# Patient Record
Sex: FEMALE | Race: WHITE | Employment: OTHER | URBAN - METROPOLITAN AREA
[De-identification: names, ages, dates, MRNs, and addresses within clinical notes are randomized per-mention and may not be internally consistent; named-entity substitution may affect disease eponyms.]

---

## 2017-01-28 ENCOUNTER — TELEPHONE (OUTPATIENT)
Dept: INTERNAL MEDICINE CLINIC | Facility: CLINIC | Age: 82
End: 2017-01-28

## 2017-01-28 NOTE — TELEPHONE ENCOUNTER
Patient states  She is in [place] and has developed a sinus infection and would like Dr. Jero Sinha to prescribe something for the infection   Advised Dr. Jero Sinha not in today.  Please check with different doctor to see if there is anything she can take for it   P

## 2017-01-28 NOTE — TELEPHONE ENCOUNTER
Onset of sxs 5 days ago. Pt states she is unsure of fever, but she does not feel feverish.   Pt states head congestion, clear nasal drainage, sore throat resolved, no ear pain, productive cough, clear sputum, pt states she feels sob during and after coughi

## 2017-04-19 ENCOUNTER — TELEPHONE (OUTPATIENT)
Dept: OPHTHALMOLOGY | Facility: CLINIC | Age: 82
End: 2017-04-19

## 2017-05-04 ENCOUNTER — TELEPHONE (OUTPATIENT)
Dept: OPHTHALMOLOGY | Facility: CLINIC | Age: 82
End: 2017-05-04

## 2017-05-04 RX ORDER — LATANOPROST 50 UG/ML
SOLUTION/ DROPS OPHTHALMIC
Qty: 7.5 ML | Refills: 0 | Status: SHIPPED | OUTPATIENT
Start: 2017-05-04 | End: 2017-08-08

## 2017-05-05 RX ORDER — LATANOPROST 50 UG/ML
SOLUTION/ DROPS OPHTHALMIC
Qty: 7.5 ML | Refills: 0 | OUTPATIENT
Start: 2017-05-05

## 2017-06-07 ENCOUNTER — PRIOR ORIGINAL RECORDS (OUTPATIENT)
Dept: OTHER | Age: 82
End: 2017-06-07

## 2017-06-14 ENCOUNTER — APPOINTMENT (OUTPATIENT)
Dept: LAB | Facility: HOSPITAL | Age: 82
End: 2017-06-14
Attending: INTERNAL MEDICINE
Payer: MEDICARE

## 2017-06-14 ENCOUNTER — TELEPHONE (OUTPATIENT)
Dept: INTERNAL MEDICINE CLINIC | Facility: CLINIC | Age: 82
End: 2017-06-14

## 2017-06-14 DIAGNOSIS — N39.0 URINARY TRACT INFECTION, SITE UNSPECIFIED: Primary | ICD-10-CM

## 2017-06-14 PROCEDURE — 87186 SC STD MICRODIL/AGAR DIL: CPT | Performed by: INTERNAL MEDICINE

## 2017-06-14 PROCEDURE — 87086 URINE CULTURE/COLONY COUNT: CPT | Performed by: INTERNAL MEDICINE

## 2017-06-14 PROCEDURE — 81001 URINALYSIS AUTO W/SCOPE: CPT | Performed by: INTERNAL MEDICINE

## 2017-06-14 PROCEDURE — 87088 URINE BACTERIA CULTURE: CPT | Performed by: INTERNAL MEDICINE

## 2017-06-14 RX ORDER — NITROFURANTOIN 25; 75 MG/1; MG/1
100 CAPSULE ORAL 2 TIMES DAILY
Qty: 10 CAPSULE | Refills: 0 | Status: ON HOLD | OUTPATIENT
Start: 2017-06-14 | End: 2017-12-31

## 2017-06-14 NOTE — TELEPHONE ENCOUNTER
Recommend urinalysis with reflex culture, then generic Macrobid 100 mg twice daily with food ×5 days.

## 2017-06-14 NOTE — TELEPHONE ENCOUNTER
Actions Requested: Pt declines appt, requesting meds to pharmacy (verified). Please advise.   Situation/Background   Problem: Urinary tract infection   Onset: 5 am this morning   Associated Symptoms: Dysuria and pressure, denies abd pain/back pain or fever,

## 2017-06-14 NOTE — TELEPHONE ENCOUNTER
Spoke with patient (identified name and ), results reviewed and agrees with plan.   rx sent/lab ordered

## 2017-08-08 RX ORDER — LATANOPROST 50 UG/ML
SOLUTION/ DROPS OPHTHALMIC
Qty: 1 BOTTLE | Refills: 11 | Status: SHIPPED | OUTPATIENT
Start: 2017-08-08 | End: 2017-12-14

## 2017-10-24 ENCOUNTER — TELEPHONE (OUTPATIENT)
Dept: OPHTHALMOLOGY | Facility: CLINIC | Age: 82
End: 2017-10-24

## 2017-10-24 NOTE — TELEPHONE ENCOUNTER
Pt noticed corner of L eye is bloodshot, requesting to be seen this week, aware RJM is out of the office. Pls advise thank you.

## 2017-10-24 NOTE — TELEPHONE ENCOUNTER
Spoke to pt and states that it looks like a broken blood vessel ; no symptoms. Explained to pt a Sub Conj Heme and may take 1-2 week to subside.

## 2017-12-14 ENCOUNTER — OFFICE VISIT (OUTPATIENT)
Dept: OPHTHALMOLOGY | Facility: CLINIC | Age: 82
End: 2017-12-14

## 2017-12-14 DIAGNOSIS — H40.1132 PRIMARY OPEN ANGLE GLAUCOMA OF BOTH EYES, MODERATE STAGE: Primary | ICD-10-CM

## 2017-12-14 DIAGNOSIS — Z96.1 PSEUDOPHAKIA OF BOTH EYES: ICD-10-CM

## 2017-12-14 DIAGNOSIS — H43.393 VITREOUS FLOATERS OF BOTH EYES: ICD-10-CM

## 2017-12-14 PROCEDURE — 92014 COMPRE OPH EXAM EST PT 1/>: CPT | Performed by: OPHTHALMOLOGY

## 2017-12-14 RX ORDER — LATANOPROST 50 UG/ML
SOLUTION/ DROPS OPHTHALMIC
Qty: 3 BOTTLE | Refills: 3 | Status: SHIPPED | OUTPATIENT
Start: 2017-12-14 | End: 2018-11-20

## 2017-12-14 NOTE — PROGRESS NOTES
Jennifer Faulkner is a [de-identified]year old female. HPI:     HPI     Patient is here for a dilated exam.  Patient had cataract surgery done in the right eye on 4/13/17, the left eye was done in June 2017. Patient is using glasses for reading only.   Patient is ve Disp:  Rfl:    Calcium Carb-Cholecalciferol (CALCIUM CARBONATE-VITAMIN D3 OR) Take 2 tablets by mouth daily. Disp:  Rfl:    Multiple Vitamin (MULTIVITAMINS) Oral Cap Take 1 capsule by mouth daily.  Disp:  Rfl:    metoprolol Tartrate (LOPRESSOR) 25 MG Oral T Type:  Progressive bifocal          Manifest Refraction     Pt declines a refraction. She does not want glasses for distance and is happy with her vision.                    ASSESSMENT/PLAN:     Diagnoses and Plan:     Primary open angle glaucoma of both ey

## 2017-12-14 NOTE — PATIENT INSTRUCTIONS
Primary open angle glaucoma of both eyes, moderate stage  Discussed with patient Intraocular pressure stable, tolerating medications. Will continue same regimen of Latanoprost in both eyes at bedtime.   Will have patient back for next available visual fiel

## 2017-12-27 ENCOUNTER — TELEPHONE (OUTPATIENT)
Dept: INTERNAL MEDICINE CLINIC | Facility: CLINIC | Age: 82
End: 2017-12-27

## 2017-12-27 NOTE — TELEPHONE ENCOUNTER
Dr Mo Lights:  Patient had to cancel appt today with you. She has her  sick at home, and patient didn't feel well to leave the house. Patient c/o of sinus drainage, stuffy nose,  No sore throat.   Has been feeling nauseated for about 11 days, no vomi

## 2017-12-27 NOTE — TELEPHONE ENCOUNTER
Recommend she use Afrin nasal spray for 2-3 days for nasal congestion, and Robitussin when needed for coughing. Appointment if not soon better.

## 2017-12-27 NOTE — TELEPHONE ENCOUNTER
Patient states unable to make 10:30 appt due to spouse is sick and has no one to drive her. Patient is nauseous and coughing and requesting medication.      Connected to triage since having symptoms

## 2017-12-31 ENCOUNTER — APPOINTMENT (OUTPATIENT)
Dept: GENERAL RADIOLOGY | Facility: HOSPITAL | Age: 82
DRG: 286 | End: 2017-12-31
Attending: EMERGENCY MEDICINE
Payer: MEDICARE

## 2017-12-31 ENCOUNTER — HOSPITAL ENCOUNTER (INPATIENT)
Facility: HOSPITAL | Age: 82
LOS: 4 days | Discharge: HOME | DRG: 286 | End: 2018-01-04
Attending: EMERGENCY MEDICINE | Admitting: HOSPITALIST
Payer: MEDICARE

## 2017-12-31 DIAGNOSIS — I48.91 ATRIAL FIBRILLATION WITH RAPID VENTRICULAR RESPONSE (HCC): Primary | ICD-10-CM

## 2017-12-31 DIAGNOSIS — R07.9 ACUTE CHEST PAIN: ICD-10-CM

## 2017-12-31 LAB
ANION GAP SERPL CALC-SCNC: 11 MMOL/L (ref 0–18)
APTT PPP: 29.8 SECONDS (ref 23.2–35.3)
BASOPHILS # BLD: 0.1 K/UL (ref 0–0.2)
BASOPHILS NFR BLD: 1 %
BNP SERPL-MCNC: 413 PG/ML (ref 0–100)
BUN SERPL-MCNC: 17 MG/DL (ref 8–20)
BUN/CREAT SERPL: 16.3 (ref 10–20)
CALCIUM SERPL-MCNC: 8.9 MG/DL (ref 8.5–10.5)
CHLORIDE SERPL-SCNC: 106 MMOL/L (ref 95–110)
CO2 SERPL-SCNC: 22 MMOL/L (ref 22–32)
CREAT SERPL-MCNC: 1.04 MG/DL (ref 0.5–1.5)
EOSINOPHIL # BLD: 0.2 K/UL (ref 0–0.7)
EOSINOPHIL NFR BLD: 3 %
ERYTHROCYTE [DISTWIDTH] IN BLOOD BY AUTOMATED COUNT: 14.5 % (ref 11–15)
GLUCOSE SERPL-MCNC: 156 MG/DL (ref 70–99)
HCT VFR BLD AUTO: 40.7 % (ref 35–48)
HGB BLD-MCNC: 13.4 G/DL (ref 12–16)
LYMPHOCYTES # BLD: 1.3 K/UL (ref 1–4)
LYMPHOCYTES NFR BLD: 19 %
MAGNESIUM SERPL-MCNC: 2 MG/DL (ref 1.8–2.5)
MCH RBC QN AUTO: 29.8 PG (ref 27–32)
MCHC RBC AUTO-ENTMCNC: 33 G/DL (ref 32–37)
MCV RBC AUTO: 90.3 FL (ref 80–100)
MONOCYTES # BLD: 0.4 K/UL (ref 0–1)
MONOCYTES NFR BLD: 6 %
NEUTROPHILS # BLD AUTO: 4.8 K/UL (ref 1.8–7.7)
NEUTROPHILS NFR BLD: 71 %
OSMOLALITY UR CALC.SUM OF ELEC: 293 MOSM/KG (ref 275–295)
PLATELET # BLD AUTO: 199 K/UL (ref 140–400)
PMV BLD AUTO: 7.8 FL (ref 7.4–10.3)
POTASSIUM SERPL-SCNC: 3.6 MMOL/L (ref 3.3–5.1)
RBC # BLD AUTO: 4.51 M/UL (ref 3.7–5.4)
SODIUM SERPL-SCNC: 139 MMOL/L (ref 136–144)
TROPONIN I SERPL-MCNC: 0.01 NG/ML (ref ?–0.03)
TSH SERPL-ACNC: 3.03 UIU/ML (ref 0.45–5.33)
WBC # BLD AUTO: 6.8 K/UL (ref 4–11)

## 2017-12-31 PROCEDURE — 99223 1ST HOSP IP/OBS HIGH 75: CPT | Performed by: HOSPITALIST

## 2017-12-31 PROCEDURE — 71010 XR CHEST AP PORTABLE  (CPT=71010): CPT | Performed by: EMERGENCY MEDICINE

## 2017-12-31 RX ORDER — DOXEPIN HYDROCHLORIDE 50 MG/1
1 CAPSULE ORAL DAILY
Status: DISCONTINUED | OUTPATIENT
Start: 2017-12-31 | End: 2018-01-04

## 2017-12-31 RX ORDER — ASPIRIN 81 MG/1
81 TABLET ORAL DAILY
Status: DISCONTINUED | OUTPATIENT
Start: 2017-12-31 | End: 2018-01-04

## 2017-12-31 RX ORDER — FUROSEMIDE 10 MG/ML
INJECTION INTRAMUSCULAR; INTRAVENOUS
Status: COMPLETED
Start: 2017-12-31 | End: 2017-12-31

## 2017-12-31 RX ORDER — ASPIRIN 81 MG/1
324 TABLET, CHEWABLE ORAL ONCE
Status: COMPLETED | OUTPATIENT
Start: 2017-12-31 | End: 2017-12-31

## 2017-12-31 RX ORDER — HEPARIN SODIUM AND DEXTROSE 10000; 5 [USP'U]/100ML; G/100ML
INJECTION INTRAVENOUS CONTINUOUS
Status: DISCONTINUED | OUTPATIENT
Start: 2018-01-01 | End: 2018-01-03

## 2017-12-31 RX ORDER — OYSTER SHELL CALCIUM WITH VITAMIN D 500; 200 MG/1; [IU]/1
1 TABLET, FILM COATED ORAL DAILY
Status: DISCONTINUED | OUTPATIENT
Start: 2017-12-31 | End: 2018-01-04

## 2017-12-31 RX ORDER — HEPARIN SODIUM 5000 [USP'U]/ML
5000 INJECTION, SOLUTION INTRAVENOUS; SUBCUTANEOUS EVERY 8 HOURS
Status: DISCONTINUED | OUTPATIENT
Start: 2017-12-31 | End: 2018-01-01

## 2017-12-31 RX ORDER — HEPARIN SODIUM AND DEXTROSE 10000; 5 [USP'U]/100ML; G/100ML
12 INJECTION INTRAVENOUS ONCE
Status: COMPLETED | OUTPATIENT
Start: 2017-12-31 | End: 2018-01-01

## 2017-12-31 RX ORDER — DILTIAZEM HYDROCHLORIDE 5 MG/ML
10 INJECTION INTRAVENOUS ONCE
Status: COMPLETED | OUTPATIENT
Start: 2017-12-31 | End: 2017-12-31

## 2017-12-31 RX ORDER — FUROSEMIDE 10 MG/ML
40 INJECTION INTRAMUSCULAR; INTRAVENOUS DAILY
Status: DISCONTINUED | OUTPATIENT
Start: 2018-01-01 | End: 2018-01-02

## 2017-12-31 RX ORDER — ONDANSETRON 2 MG/ML
4 INJECTION INTRAMUSCULAR; INTRAVENOUS EVERY 6 HOURS PRN
Status: DISCONTINUED | OUTPATIENT
Start: 2017-12-31 | End: 2018-01-04

## 2017-12-31 RX ORDER — LATANOPROST 50 UG/ML
1 SOLUTION/ DROPS OPHTHALMIC NIGHTLY
Status: DISCONTINUED | OUTPATIENT
Start: 2017-12-31 | End: 2018-01-04

## 2017-12-31 RX ORDER — HEPARIN SODIUM 1000 [USP'U]/ML
5000 INJECTION, SOLUTION INTRAVENOUS; SUBCUTANEOUS ONCE
Status: COMPLETED | OUTPATIENT
Start: 2017-12-31 | End: 2018-01-01

## 2017-12-31 RX ORDER — FUROSEMIDE 10 MG/ML
40 INJECTION INTRAMUSCULAR; INTRAVENOUS ONCE
Status: COMPLETED | OUTPATIENT
Start: 2017-12-31 | End: 2017-12-31

## 2017-12-31 NOTE — ED INITIAL ASSESSMENT (HPI)
C/o nausea x 2 weeks, no worsening. C/o left arm pain today. Denies shoulder pain, c/o some chest pressure, c/o SOB with exertion x last few weeks.

## 2017-12-31 NOTE — ED PROVIDER NOTES
Patient Seen in: [facility] AND [facility] Emergency Department    History   Patient presents with:  Chest Pain Angina (cardiovascular)  Shortness Of Breath    Stated Complaint: nausea, pain down left arm    HPI    The patient is an 82-year-old female with a rem Exam   ED Triage Vitals [12/31/17 4797]  BP: 143/82  Pulse: 74  Resp: 18  Temp: (!) 97 °F (36.1 °C)  Temp src: n/a  SpO2: 94 %  O2 Device: None (Room air)    Current:/68   Pulse 98   Temp (!) 97 °F (36.1 °C)   Resp 18   Wt 86 kg   SpO2 92%   BMI 28. 0 ---------                               -----------         ------                     CBC W/ DIFFERENTIAL[[id number]]                              Final result                 Please view results for these tests on the individual orders.    RAINBOW DRAW B Medication List        Present on Admission  Date Reviewed: 10/20/2016          ICD-10-CM Noted POA    Atrial fibrillation with rapid ventricular response ([facility] [address] I48.91 12/31/2017 Unknown

## 2018-01-01 ENCOUNTER — APPOINTMENT (OUTPATIENT)
Dept: CV DIAGNOSTICS | Facility: HOSPITAL | Age: 83
DRG: 286 | End: 2018-01-01
Attending: HOSPITALIST
Payer: MEDICARE

## 2018-01-01 LAB
ANION GAP SERPL CALC-SCNC: 8 MMOL/L (ref 0–18)
APTT PPP: 162.6 SECONDS (ref 23.2–35.3)
APTT PPP: 51.3 SECONDS (ref 23.2–35.3)
BASOPHILS # BLD: 0.1 K/UL (ref 0–0.2)
BASOPHILS NFR BLD: 1 %
BILIRUB UR QL: NEGATIVE
BUN SERPL-MCNC: 17 MG/DL (ref 8–20)
BUN/CREAT SERPL: 15.9 (ref 10–20)
CALCIUM SERPL-MCNC: 8.7 MG/DL (ref 8.5–10.5)
CHLORIDE SERPL-SCNC: 104 MMOL/L (ref 95–110)
CLARITY UR: CLEAR
CO2 SERPL-SCNC: 28 MMOL/L (ref 22–32)
COLOR UR: YELLOW
CREAT SERPL-MCNC: 1.07 MG/DL (ref 0.5–1.5)
EOSINOPHIL # BLD: 0.3 K/UL (ref 0–0.7)
EOSINOPHIL NFR BLD: 3 %
ERYTHROCYTE [DISTWIDTH] IN BLOOD BY AUTOMATED COUNT: 14.6 % (ref 11–15)
GLUCOSE SERPL-MCNC: 119 MG/DL (ref 70–99)
GLUCOSE UR-MCNC: NEGATIVE MG/DL
HCT VFR BLD AUTO: 40.9 % (ref 35–48)
HGB BLD-MCNC: 13.3 G/DL (ref 12–16)
HYALINE CASTS #/AREA URNS AUTO: 1 /LPF
KETONES UR-MCNC: NEGATIVE MG/DL
LEUKOCYTE ESTERASE UR QL STRIP.AUTO: NEGATIVE
LYMPHOCYTES # BLD: 2 K/UL (ref 1–4)
LYMPHOCYTES NFR BLD: 24 %
MCH RBC QN AUTO: 30 PG (ref 27–32)
MCHC RBC AUTO-ENTMCNC: 32.6 G/DL (ref 32–37)
MCV RBC AUTO: 91.9 FL (ref 80–100)
MONOCYTES # BLD: 0.7 K/UL (ref 0–1)
MONOCYTES NFR BLD: 8 %
NEUTROPHILS # BLD AUTO: 5.4 K/UL (ref 1.8–7.7)
NEUTROPHILS NFR BLD: 64 %
NITRITE UR QL STRIP.AUTO: NEGATIVE
OSMOLALITY UR CALC.SUM OF ELEC: 293 MOSM/KG (ref 275–295)
PH UR: 7 [PH] (ref 5–8)
PLATELET # BLD AUTO: 227 K/UL (ref 140–400)
PMV BLD AUTO: 8.4 FL (ref 7.4–10.3)
POTASSIUM SERPL-SCNC: 3.3 MMOL/L (ref 3.3–5.1)
PROT UR-MCNC: NEGATIVE MG/DL
RBC # BLD AUTO: 4.45 M/UL (ref 3.7–5.4)
RBC #/AREA URNS AUTO: 1 /HPF
SODIUM SERPL-SCNC: 140 MMOL/L (ref 136–144)
SP GR UR STRIP: 1.01 (ref 1–1.03)
UROBILINOGEN UR STRIP-ACNC: <2
VIT C UR-MCNC: NEGATIVE MG/DL
WBC # BLD AUTO: 8.3 K/UL (ref 4–11)
WBC #/AREA URNS AUTO: <1 /HPF

## 2018-01-01 PROCEDURE — 93306 TTE W/DOPPLER COMPLETE: CPT | Performed by: HOSPITALIST

## 2018-01-01 PROCEDURE — 99233 SBSQ HOSP IP/OBS HIGH 50: CPT | Performed by: HOSPITALIST

## 2018-01-01 PROCEDURE — B246ZZZ ULTRASONOGRAPHY OF RIGHT AND LEFT HEART: ICD-10-PCS | Performed by: INTERNAL MEDICINE

## 2018-01-01 RX ORDER — METOPROLOL TARTRATE 50 MG/1
50 TABLET, FILM COATED ORAL 2 TIMES DAILY
Status: DISCONTINUED | OUTPATIENT
Start: 2018-01-01 | End: 2018-01-02

## 2018-01-01 NOTE — PROGRESS NOTES
[facility]    Progress Note    Andres Dutton Patient Status:  Inpatient    1935 MRN [id number]   Location [facility] 3W/SW Attending Raúl Cruz MD   Hosp Day # 1 PCP Kvng Baker MD       Subjective:   Armida Watts tablet Oral Daily   • furosemide  40 mg Intravenous Daily       Current PRN Inpatient Meds:      ondansetron HCl    Results:     Lab Results  Component Value Date   WBC 8.3 01/01/2018   HGB 13.3 01/01/2018   HCT 40.9 01/01/2018    01/01/2018   CREAT Plan:   Paroxysmal atrial fibrillation with rapid ventricular rate  Patient started on Cardizem drip will continue the same, cardiology has been consulted. Will check TSH and mag.   2D echo pending     Likely acute diastolic heart failure  BNP elevated at

## 2018-01-01 NOTE — CONSULTS
[facility]  Cardiology Consultation    Demetrius Gibbons Patient Status:  Inpatient    1935 MRN [id number]   Location [facility] 3W/SW Attending Dayo Davis MD   Hosp Day # 0 PCP Ginny Qureshi MD     Reason for [facility] • Diabetes Father    • Pancreatic Cancer [OTHER] Father    • Renal Cell Carcinoma [OTHER] Brother    • Crohn's Disease Brother    • Glaucoma Mother    • Glaucoma Paternal Aunt    • Macular degeneration Paternal Aunt       reports that she has never smoke hours ending 12/31/17 2334  Wt Readings from Last 3 Encounters:  12/31/17 : 185 lb (83.9 kg)  10/12/16 : 189 lb 11.2 oz (86 kg)  10/27/14 : 184 lb (83.5 kg)    Physical Exam:   General: Alert and oriented x 3. No apparent distress.  No respiratory or consti

## 2018-01-01 NOTE — PROGRESS NOTES
[facility]    Cardiology Progress Note  [facility]    Deepti Johnston Patient Status:  Inpatient    1935 MRN [id number]   Location [facility] 3W/SW Attending Veronika Howell MD   Hosp Day # 1 PCP Sung 1.04 12/31/2017   BUN 17 12/31/2017    12/31/2017   K 3.6 12/31/2017    12/31/2017   CO2 22 12/31/2017    (H) 12/31/2017   CA 8.9 12/31/2017   ALB 3.5 10/15/2016   ALKPHO 82 10/15/2016   BILT 0.7 10/15/2016   TP 6.7 10/15/2016   AST 26 1

## 2018-01-01 NOTE — H&P
[address] Patient Status:  Emergency    1935 MRN [id number]   Location [address] Attending Toma Ramirez MD   Hosp Day # 0 PCP Esequiel Corrales MD     Date: Crohn's Disease Brother    • Glaucoma Mother    • Glaucoma Paternal Aunt    • Macular degeneration Paternal Aunt       reports that she has never smoked. She has never used smokeless tobacco. She reports that she drinks about 0.5 oz of alcohol per week .  Latonya Aggarwal atraumatic. Neck:  Supple, non-tender, no carotid bruit, no jugular venous distention, no lymphadenopathy, no thyromegaly.   Respiratory:  Lungs are clear to auscultation, respirations are non-labored, breath sounds are equal, symmetrical chest wall expans PM

## 2018-01-01 NOTE — HISTORICAL OFFICE NOTE
MAY TAYLOR  : 1935  ACCOUNT: [id number]  PCP: Dr. Weeks Lo: 2017    HPI: Tamie Mansfield is here for a followup appointment.   She is a 51-year-old  female who has history of paroxysmal atrial fibrillation in remote past, SVT, an beverages daily. ALCOHOL: drinks socially. EXERCISE: regular exercise. DIET: no special diet. MARITAL STATUS: . EDUCATION: post graduate degree. OCCUPATION: teacher.     ALLERGIES: Sulfa Antibiotics - CLASS    MEDICATIONS: Selected prescriptions see

## 2018-01-01 NOTE — PROGRESS NOTES
followup  Echo with lv dysfunction. Could be from af but less likely due to short duration. willl schedule for reg nuc for now and attempt to inc po metoprolol, change to long acting thereafter.

## 2018-01-02 ENCOUNTER — APPOINTMENT (OUTPATIENT)
Dept: NUCLEAR MEDICINE | Facility: HOSPITAL | Age: 83
DRG: 286 | End: 2018-01-02
Attending: INTERNAL MEDICINE
Payer: MEDICARE

## 2018-01-02 ENCOUNTER — APPOINTMENT (OUTPATIENT)
Dept: CV DIAGNOSTICS | Facility: HOSPITAL | Age: 83
DRG: 286 | End: 2018-01-02
Attending: INTERNAL MEDICINE
Payer: MEDICARE

## 2018-01-02 LAB
ANION GAP SERPL CALC-SCNC: 6 MMOL/L (ref 0–18)
APTT PPP: 52.4 SECONDS (ref 23.2–35.3)
BASOPHILS # BLD: 0.1 K/UL (ref 0–0.2)
BASOPHILS NFR BLD: 1 %
BUN SERPL-MCNC: 15 MG/DL (ref 8–20)
BUN/CREAT SERPL: 14.4 (ref 10–20)
CALCIUM SERPL-MCNC: 8.7 MG/DL (ref 8.5–10.5)
CHLORIDE SERPL-SCNC: 106 MMOL/L (ref 95–110)
CO2 SERPL-SCNC: 26 MMOL/L (ref 22–32)
CREAT SERPL-MCNC: 1.04 MG/DL (ref 0.5–1.5)
EOSINOPHIL # BLD: 0.3 K/UL (ref 0–0.7)
EOSINOPHIL NFR BLD: 3 %
ERYTHROCYTE [DISTWIDTH] IN BLOOD BY AUTOMATED COUNT: 14.1 % (ref 11–15)
GLUCOSE SERPL-MCNC: 105 MG/DL (ref 70–99)
HCT VFR BLD AUTO: 37.3 % (ref 35–48)
HGB BLD-MCNC: 12.4 G/DL (ref 12–16)
LYMPHOCYTES # BLD: 1.8 K/UL (ref 1–4)
LYMPHOCYTES NFR BLD: 22 %
MAGNESIUM SERPL-MCNC: 1.9 MG/DL (ref 1.8–2.5)
MCH RBC QN AUTO: 30.1 PG (ref 27–32)
MCHC RBC AUTO-ENTMCNC: 33.3 G/DL (ref 32–37)
MCV RBC AUTO: 90.4 FL (ref 80–100)
MONOCYTES # BLD: 0.8 K/UL (ref 0–1)
MONOCYTES NFR BLD: 10 %
NEUTROPHILS # BLD AUTO: 5.4 K/UL (ref 1.8–7.7)
NEUTROPHILS NFR BLD: 64 %
OSMOLALITY UR CALC.SUM OF ELEC: 287 MOSM/KG (ref 275–295)
PLATELET # BLD AUTO: 189 K/UL (ref 140–400)
PMV BLD AUTO: 8.1 FL (ref 7.4–10.3)
POTASSIUM SERPL-SCNC: 3.4 MMOL/L (ref 3.3–5.1)
POTASSIUM SERPL-SCNC: 4 MMOL/L (ref 3.3–5.1)
RBC # BLD AUTO: 4.13 M/UL (ref 3.7–5.4)
SODIUM SERPL-SCNC: 138 MMOL/L (ref 136–144)
WBC # BLD AUTO: 8.4 K/UL (ref 4–11)

## 2018-01-02 PROCEDURE — 78452 HT MUSCLE IMAGE SPECT MULT: CPT | Performed by: INTERNAL MEDICINE

## 2018-01-02 PROCEDURE — 4A12XM4 MONITORING OF CARDIAC STRESS, EXTERNAL APPROACH: ICD-10-PCS | Performed by: NUCLEAR MEDICINE

## 2018-01-02 PROCEDURE — 3E033HZ INTRODUCTION OF RADIOACTIVE SUBSTANCE INTO PERIPHERAL VEIN, PERCUTANEOUS APPROACH: ICD-10-PCS | Performed by: NUCLEAR MEDICINE

## 2018-01-02 PROCEDURE — 93017 CV STRESS TEST TRACING ONLY: CPT | Performed by: INTERNAL MEDICINE

## 2018-01-02 PROCEDURE — 99233 SBSQ HOSP IP/OBS HIGH 50: CPT | Performed by: HOSPITALIST

## 2018-01-02 RX ORDER — 0.9 % SODIUM CHLORIDE 0.9 %
VIAL (ML) INJECTION
Status: COMPLETED
Start: 2018-01-02 | End: 2018-01-02

## 2018-01-02 RX ORDER — ASPIRIN 81 MG/1
324 TABLET, CHEWABLE ORAL ONCE
Status: COMPLETED | OUTPATIENT
Start: 2018-01-03 | End: 2018-01-03

## 2018-01-02 RX ORDER — FUROSEMIDE 20 MG/1
20 TABLET ORAL DAILY
Status: DISCONTINUED | OUTPATIENT
Start: 2018-01-03 | End: 2018-01-02

## 2018-01-02 RX ORDER — POTASSIUM CHLORIDE 20 MEQ/1
40 TABLET, EXTENDED RELEASE ORAL EVERY 4 HOURS
Status: COMPLETED | OUTPATIENT
Start: 2018-01-02 | End: 2018-01-02

## 2018-01-02 RX ORDER — CHLORHEXIDINE GLUCONATE 4 G/100ML
30 SOLUTION TOPICAL
Status: COMPLETED | OUTPATIENT
Start: 2018-01-03 | End: 2018-01-03

## 2018-01-02 NOTE — PLAN OF CARE
Problem: Patient/Family Goals  Goal: Patient/Family Long Term Goal  Patient's Long Term Goal: To go home    Interventions:  - Monitor labs/VS/tele  - Tests/meds per order  - See additional Care Plan goals for specific interventions    Outcome: Progressing

## 2018-01-02 NOTE — PROGRESS NOTES
[facility]   Progress Note    Cynthia Narayan Patient Status:  Inpatient    1935 MRN [id number]   Location [facility] 3W/SW Attending Malka Ca MD   [address] Day # 2 PCP Jamil Martinez MD is medical management vs angiogram if positive     3) Acute diastolic CHF  -echo on 2/1/32 with mild LVH, EF35-40%, no WMA, moderate-severe MR, moderate-severe TR, PA pressure=44mmHg  -on Lasix 40mg IV daily, weight down 2lb and only trace edema, will swit voltage in limb leads.  -Incomplete left bundle branch block.  -Poor R-wave progression -may be secondary to conduction defect consider old anterior infarct. -ST depression + T-abnormality - Lateral ischemia.  ABNORMAL When compared with ECG of 07/13/2011

## 2018-01-02 NOTE — PROGRESS NOTES
[facility]    Progress Note    Aliyah Champion Patient Status:  Inpatient    1935 MRN [id number]   Location [facility] 3W/SW Attending Elsa Mcpherson MD   [facility] Day # 2 PCP Pasty Cooks, MD     Subjective:   Subjective 01/02/2018   K 3.4 01/02/2018    01/02/2018   CO2 26 01/02/2018    (H) 01/02/2018   CA 8.7 01/02/2018   ALB 3.5 10/15/2016   ALKPHO 82 10/15/2016   BILT 0.7 10/15/2016   TP 6.7 10/15/2016   AST 26 10/15/2016   ALT 20 10/15/2016   PTT 52.4 (H)

## 2018-01-03 ENCOUNTER — APPOINTMENT (OUTPATIENT)
Dept: INTERVENTIONAL RADIOLOGY/VASCULAR | Facility: HOSPITAL | Age: 83
DRG: 286 | End: 2018-01-03
Attending: NURSE PRACTITIONER
Payer: MEDICARE

## 2018-01-03 LAB
ANION GAP SERPL CALC-SCNC: 9 MMOL/L (ref 0–18)
APTT PPP: 46 SECONDS (ref 23.2–35.3)
APTT PPP: 51.9 SECONDS (ref 23.2–35.3)
BASOPHILS # BLD: 0.1 K/UL (ref 0–0.2)
BASOPHILS NFR BLD: 1 %
BUN SERPL-MCNC: 19 MG/DL (ref 8–20)
BUN/CREAT SERPL: 19.8 (ref 10–20)
CALCIUM SERPL-MCNC: 9.1 MG/DL (ref 8.5–10.5)
CHLORIDE SERPL-SCNC: 106 MMOL/L (ref 95–110)
CO2 SERPL-SCNC: 25 MMOL/L (ref 22–32)
CREAT SERPL-MCNC: 0.96 MG/DL (ref 0.5–1.5)
EOSINOPHIL # BLD: 0.3 K/UL (ref 0–0.7)
EOSINOPHIL NFR BLD: 4 %
ERYTHROCYTE [DISTWIDTH] IN BLOOD BY AUTOMATED COUNT: 14.6 % (ref 11–15)
GLUCOSE SERPL-MCNC: 108 MG/DL (ref 70–99)
HCT VFR BLD AUTO: 39.5 % (ref 35–48)
HGB BLD-MCNC: 12.9 G/DL (ref 12–16)
INR BLD: 1.1 (ref 0.9–1.2)
LYMPHOCYTES # BLD: 2 K/UL (ref 1–4)
LYMPHOCYTES NFR BLD: 23 %
MAGNESIUM SERPL-MCNC: 2 MG/DL (ref 1.8–2.5)
MCH RBC QN AUTO: 29.7 PG (ref 27–32)
MCHC RBC AUTO-ENTMCNC: 32.6 G/DL (ref 32–37)
MCV RBC AUTO: 91 FL (ref 80–100)
MONOCYTES # BLD: 0.9 K/UL (ref 0–1)
MONOCYTES NFR BLD: 11 %
NEUTROPHILS # BLD AUTO: 5.4 K/UL (ref 1.8–7.7)
NEUTROPHILS NFR BLD: 62 %
OSMOLALITY UR CALC.SUM OF ELEC: 293 MOSM/KG (ref 275–295)
PHOSPHATE SERPL-MCNC: 3.6 MG/DL (ref 2.4–4.7)
PLATELET # BLD AUTO: 174 K/UL (ref 140–400)
PMV BLD AUTO: 7.9 FL (ref 7.4–10.3)
POTASSIUM SERPL-SCNC: 4.2 MMOL/L (ref 3.3–5.1)
PROTHROMBIN TIME: 13.8 SECONDS (ref 11.8–14.5)
RBC # BLD AUTO: 4.34 M/UL (ref 3.7–5.4)
SODIUM SERPL-SCNC: 140 MMOL/L (ref 136–144)
WBC # BLD AUTO: 8.7 K/UL (ref 4–11)

## 2018-01-03 PROCEDURE — B41F1ZZ FLUOROSCOPY OF RIGHT LOWER EXTREMITY ARTERIES USING LOW OSMOLAR CONTRAST: ICD-10-PCS | Performed by: INTERNAL MEDICINE

## 2018-01-03 PROCEDURE — B2151ZZ FLUOROSCOPY OF LEFT HEART USING LOW OSMOLAR CONTRAST: ICD-10-PCS | Performed by: INTERNAL MEDICINE

## 2018-01-03 PROCEDURE — 4A023N7 MEASUREMENT OF CARDIAC SAMPLING AND PRESSURE, LEFT HEART, PERCUTANEOUS APPROACH: ICD-10-PCS | Performed by: INTERNAL MEDICINE

## 2018-01-03 PROCEDURE — 99233 SBSQ HOSP IP/OBS HIGH 50: CPT | Performed by: HOSPITALIST

## 2018-01-03 PROCEDURE — B2111ZZ FLUOROSCOPY OF MULTIPLE CORONARY ARTERIES USING LOW OSMOLAR CONTRAST: ICD-10-PCS | Performed by: INTERNAL MEDICINE

## 2018-01-03 RX ORDER — SODIUM CHLORIDE 9 MG/ML
INJECTION, SOLUTION INTRAVENOUS
Status: COMPLETED
Start: 2018-01-03 | End: 2018-01-03

## 2018-01-03 RX ORDER — MIDAZOLAM HYDROCHLORIDE 1 MG/ML
INJECTION INTRAMUSCULAR; INTRAVENOUS
Status: COMPLETED
Start: 2018-01-03 | End: 2018-01-03

## 2018-01-03 RX ORDER — LIDOCAINE HYDROCHLORIDE 20 MG/ML
INJECTION, SOLUTION EPIDURAL; INFILTRATION; INTRACAUDAL; PERINEURAL
Status: COMPLETED
Start: 2018-01-03 | End: 2018-01-03

## 2018-01-03 RX ORDER — SODIUM CHLORIDE 9 MG/ML
INJECTION, SOLUTION INTRAVENOUS CONTINUOUS
Status: ACTIVE | OUTPATIENT
Start: 2018-01-03 | End: 2018-01-03

## 2018-01-03 NOTE — PROGRESS NOTES
[place] [facility] - [facility]    Progress Note    Hernando Serrano Patient Status:  Inpatient    1935 MRN [id number]   Location [facility] 3W/SW Attending Dick Mancuso MD   [facility] Day # 3 PCP Chilango Ragsdale MD     Subjective:   Subjective workup    Results:       Lab Results  Component Value Date   WBC 8.7 01/03/2018   HGB 12.9 01/03/2018   HCT 39.5 01/03/2018    01/03/2018   CREATSERUM 0.96 01/03/2018   BUN 19 01/03/2018    01/03/2018   K 4.2 01/03/2018    01/03/2018   C

## 2018-01-03 NOTE — PROGRESS NOTES
Juan Huynh  [id number]      Post procedure/ recovery hand-off report given to Ruthie Francisco, [facility]. Patient's vital signs are stable. Procedural  access site is dry and intact with no signs and symptoms of bleeding/ hematoma.  Dr. Lucretia Macias spoke with pt post procedu

## 2018-01-03 NOTE — BRIEF PROCEDURE NOTE
[facility]    [facility] Cardiac Cath Procedure Note  Villagomez Ponrichardson Patient Status:  Inpatient    1935 MRN [id number]   Location [facility] Attending Dick Mancuso MD   [facility] Day # 3 PCP Chilango Ragsdale

## 2018-01-03 NOTE — PRE-SEDATION ASSESSMENT
Pre-Procedure Sedation Assessment    Chief Complaint/Indication for procedure: decreased EF    History of snoring or sleep or apnea?    No    History of previous problems with anesthesia or sedation  No    Physical Findings:  Neck: nl ROM  CV: RRR no GMR  P

## 2018-01-04 ENCOUNTER — PRIOR ORIGINAL RECORDS (OUTPATIENT)
Dept: OTHER | Age: 83
End: 2018-01-04

## 2018-01-04 VITALS
OXYGEN SATURATION: 96 % | WEIGHT: 185.63 LBS | RESPIRATION RATE: 20 BRPM | DIASTOLIC BLOOD PRESSURE: 78 MMHG | HEART RATE: 94 BPM | TEMPERATURE: 98 F | HEIGHT: 71 IN | BODY MASS INDEX: 25.99 KG/M2 | SYSTOLIC BLOOD PRESSURE: 108 MMHG

## 2018-01-04 LAB
ANION GAP SERPL CALC-SCNC: 6 MMOL/L (ref 0–18)
BASOPHILS # BLD: 0.1 K/UL (ref 0–0.2)
BASOPHILS NFR BLD: 1 %
BUN SERPL-MCNC: 19 MG/DL (ref 8–20)
BUN/CREAT SERPL: 18.4 (ref 10–20)
CALCIUM SERPL-MCNC: 8.9 MG/DL (ref 8.5–10.5)
CHLORIDE SERPL-SCNC: 107 MMOL/L (ref 95–110)
CO2 SERPL-SCNC: 26 MMOL/L (ref 22–32)
CREAT SERPL-MCNC: 1.03 MG/DL (ref 0.5–1.5)
EOSINOPHIL # BLD: 0.3 K/UL (ref 0–0.7)
EOSINOPHIL NFR BLD: 3 %
ERYTHROCYTE [DISTWIDTH] IN BLOOD BY AUTOMATED COUNT: 15.1 % (ref 11–15)
GLUCOSE SERPL-MCNC: 107 MG/DL (ref 70–99)
HCT VFR BLD AUTO: 41.5 % (ref 35–48)
HGB BLD-MCNC: 13.6 G/DL (ref 12–16)
LYMPHOCYTES # BLD: 1.4 K/UL (ref 1–4)
LYMPHOCYTES NFR BLD: 16 %
MAGNESIUM SERPL-MCNC: 2.1 MG/DL (ref 1.8–2.5)
MCH RBC QN AUTO: 30.1 PG (ref 27–32)
MCHC RBC AUTO-ENTMCNC: 32.8 G/DL (ref 32–37)
MCV RBC AUTO: 92 FL (ref 80–100)
MONOCYTES # BLD: 0.8 K/UL (ref 0–1)
MONOCYTES NFR BLD: 9 %
NEUTROPHILS # BLD AUTO: 6.4 K/UL (ref 1.8–7.7)
NEUTROPHILS NFR BLD: 71 %
OSMOLALITY UR CALC.SUM OF ELEC: 291 MOSM/KG (ref 275–295)
PHOSPHATE SERPL-MCNC: 3.6 MG/DL (ref 2.4–4.7)
PLATELET # BLD AUTO: 189 K/UL (ref 140–400)
PMV BLD AUTO: 7.8 FL (ref 7.4–10.3)
POTASSIUM SERPL-SCNC: 4.5 MMOL/L (ref 3.3–5.1)
RBC # BLD AUTO: 4.51 M/UL (ref 3.7–5.4)
SODIUM SERPL-SCNC: 139 MMOL/L (ref 136–144)
WBC # BLD AUTO: 9 K/UL (ref 4–11)

## 2018-01-04 PROCEDURE — 99239 HOSP IP/OBS DSCHRG MGMT >30: CPT | Performed by: HOSPITALIST

## 2018-01-04 RX ORDER — ASPIRIN 81 MG/1
81 TABLET ORAL DAILY
Qty: 30 TABLET | Refills: 0 | Status: SHIPPED | OUTPATIENT
Start: 2018-01-05 | End: 2018-01-04

## 2018-01-04 RX ORDER — METOPROLOL TARTRATE 100 MG/1
100 TABLET ORAL 2 TIMES DAILY
Status: DISCONTINUED | OUTPATIENT
Start: 2018-01-04 | End: 2018-01-04

## 2018-01-04 RX ORDER — METOPROLOL TARTRATE 100 MG/1
100 TABLET ORAL 2 TIMES DAILY
Qty: 60 TABLET | Refills: 0 | Status: ON HOLD | OUTPATIENT
Start: 2018-01-04 | End: 2018-01-23

## 2018-01-04 RX ORDER — DILTIAZEM HYDROCHLORIDE 180 MG/1
180 CAPSULE, COATED, EXTENDED RELEASE ORAL DAILY
Qty: 30 CAPSULE | Refills: 0 | Status: SHIPPED | OUTPATIENT
Start: 2018-01-05 | End: 2018-01-23

## 2018-01-04 RX ORDER — DILTIAZEM HYDROCHLORIDE 180 MG/1
180 CAPSULE, COATED, EXTENDED RELEASE ORAL DAILY
Status: DISCONTINUED | OUTPATIENT
Start: 2018-01-05 | End: 2018-01-04

## 2018-01-04 NOTE — PROGRESS NOTES
[facility]    Cardiology Progress Note  [facility]    Cynthia Narayan Patient Status:  Inpatient    1935 MRN [id number]   Location [facility] 3W/SW Attending Malka Ca MD   [facility] Day # 4 PCP Danis Shepherd 8.9 01/04/2018   ALB 3.5 10/15/2016   ALKPHO 82 10/15/2016   BILT 0.7 10/15/2016   TP 6.7 10/15/2016   AST 26 10/15/2016   ALT 20 10/15/2016   PTT 46.0 (H) 01/03/2018   PTT 51.9 (H) 01/03/2018   INR 1.1 01/03/2018   TSH 3.03 12/31/2017   MG 2.1 01/04/2018

## 2018-01-04 NOTE — PROGRESS NOTES
Discharge instructions reviewed with patient and family member (daughter). Patient going home with daughter this afternoon.

## 2018-01-04 NOTE — PROGRESS NOTES
Patient s/p cath, right groin puncture site c/d/i, soft, no bleeding or hematoma, no bruit. Post cath activity/wound instructions reviewed with patient and verbalized understanding.  Patient to follow-up in Afib clinic in 1 week with wound check and then

## 2018-01-05 ENCOUNTER — PATIENT OUTREACH (OUTPATIENT)
Dept: CASE MANAGEMENT | Age: 83
End: 2018-01-05

## 2018-01-05 ENCOUNTER — TELEPHONE (OUTPATIENT)
Dept: CARDIOLOGY UNIT | Facility: HOSPITAL | Age: 83
End: 2018-01-05

## 2018-01-05 ENCOUNTER — TELEPHONE (OUTPATIENT)
Dept: INTERNAL MEDICINE UNIT | Facility: HOSPITAL | Age: 83
End: 2018-01-05

## 2018-01-05 NOTE — PROGRESS NOTES
[facility] ext [phone], [facility] contact information provided.

## 2018-01-05 NOTE — TELEPHONE ENCOUNTER
Attempted to schedule a [facility] hospital follow up with PCP Darren Reynoso Thank you

## 2018-01-06 NOTE — DISCHARGE SUMMARY
[facility]    PATIENT'S NAME: MAY Bass   ATTENDING PHYSICIAN: Crystal Hernández MD   PATIENT ACCOUNT#:   [id number]    LOCATION:  [address] [place] [address] Road #:   [id number]       YOB: 1935  ADMISSION DATE:       12/31/ Pupils equal, round,  _______. CARDIAC:  S1, S2, regularly irregular rhythm. ABDOMEN:  Soft, nontender, nondistended. LUNGS:  Good air entry bilaterally. EXTREMITIES:  No lower extremity edema.   NEUROLOGIC:  No focal neurologic deficits  noted at [facility]

## 2018-01-08 ENCOUNTER — TELEPHONE (OUTPATIENT)
Dept: MEDSURG UNIT | Facility: HOSPITAL | Age: 83
End: 2018-01-08

## 2018-01-08 PROBLEM — I50.21 ACUTE SYSTOLIC CHF (CONGESTIVE HEART FAILURE) (HCC): Status: ACTIVE | Noted: 2017-12-01

## 2018-01-08 NOTE — PROGRESS NOTES
[facility] ext [phone], [facility] contact information provided.

## 2018-01-09 ENCOUNTER — TELEPHONE (OUTPATIENT)
Dept: MEDSURG UNIT | Facility: HOSPITAL | Age: 83
End: 2018-01-09

## 2018-01-09 NOTE — PROGRESS NOTES
TCM OUTREACH    Call made to attempt to reach patient for TCM follow up. No answer, Voicemail left requesting call back at [phone].     (Triage Team if pt returns call please transfer to ext [phone])

## 2018-01-11 ENCOUNTER — OFFICE VISIT (OUTPATIENT)
Dept: CARDIOLOGY CLINIC | Facility: HOSPITAL | Age: 83
End: 2018-01-11
Attending: INTERNAL MEDICINE
Payer: MEDICARE

## 2018-01-11 VITALS
OXYGEN SATURATION: 98 % | BODY MASS INDEX: 26 KG/M2 | DIASTOLIC BLOOD PRESSURE: 63 MMHG | SYSTOLIC BLOOD PRESSURE: 101 MMHG | HEART RATE: 104 BPM | WEIGHT: 188.63 LBS

## 2018-01-11 DIAGNOSIS — I50.9 HEART FAILURE, UNSPECIFIED (HCC): Primary | ICD-10-CM

## 2018-01-11 DIAGNOSIS — I34.0 MITRAL VALVE INSUFFICIENCY, UNSPECIFIED ETIOLOGY: ICD-10-CM

## 2018-01-11 DIAGNOSIS — I50.22 CHRONIC SYSTOLIC HEART FAILURE (HCC): ICD-10-CM

## 2018-01-11 DIAGNOSIS — R09.81 SINUS CONGESTION: ICD-10-CM

## 2018-01-11 DIAGNOSIS — I48.91 ATRIAL FIBRILLATION WITH RAPID VENTRICULAR RESPONSE (HCC): ICD-10-CM

## 2018-01-11 DIAGNOSIS — Z87.898 HISTORY OF SNORING: ICD-10-CM

## 2018-01-11 PROBLEM — R00.2 PALPITATIONS: Chronic | Status: ACTIVE | Noted: 2018-01-11

## 2018-01-11 LAB
ANION GAP SERPL CALC-SCNC: 6 MMOL/L (ref 0–18)
BUN SERPL-MCNC: 17 MG/DL (ref 8–20)
BUN/CREAT SERPL: 15.7 (ref 10–20)
CALCIUM SERPL-MCNC: 9.6 MG/DL (ref 8.5–10.5)
CHLORIDE SERPL-SCNC: 110 MMOL/L (ref 95–110)
CO2 SERPL-SCNC: 25 MMOL/L (ref 22–32)
CREAT SERPL-MCNC: 1.08 MG/DL (ref 0.5–1.5)
GLUCOSE SERPL-MCNC: 137 MG/DL (ref 70–99)
OSMOLALITY UR CALC.SUM OF ELEC: 296 MOSM/KG (ref 275–295)
POTASSIUM SERPL-SCNC: 4.5 MMOL/L (ref 3.3–5.1)
SODIUM SERPL-SCNC: 141 MMOL/L (ref 136–144)

## 2018-01-11 PROCEDURE — 99214 OFFICE O/P EST MOD 30 MIN: CPT | Performed by: NURSE PRACTITIONER

## 2018-01-11 PROCEDURE — 93005 ELECTROCARDIOGRAM TRACING: CPT

## 2018-01-11 PROCEDURE — 80048 BASIC METABOLIC PNL TOTAL CA: CPT | Performed by: NURSE PRACTITIONER

## 2018-01-11 PROCEDURE — 93010 ELECTROCARDIOGRAM REPORT: CPT | Performed by: NURSE PRACTITIONER

## 2018-01-11 PROCEDURE — 36415 COLL VENOUS BLD VENIPUNCTURE: CPT | Performed by: NURSE PRACTITIONER

## 2018-01-11 PROCEDURE — 99212 OFFICE O/P EST SF 10 MIN: CPT | Performed by: NURSE PRACTITIONER

## 2018-01-11 RX ORDER — AZITHROMYCIN 250 MG/1
TABLET, FILM COATED ORAL
Qty: 6 TABLET | Refills: 0 | Status: ON HOLD | OUTPATIENT
Start: 2018-01-11 | End: 2018-01-22 | Stop reason: ALTCHOICE

## 2018-01-11 NOTE — PATIENT INSTRUCTIONS
Begin a Zithromax 250 mg tablets, take 2 tablets today then 1 tablet daily starting tomorrow for 4 days then discontinue    Continue all your same medications    Call if having any dizziness, lightheadedness, heart racing, palpitations, chest pain, shortne

## 2018-01-11 NOTE — PROGRESS NOTES
[address] [facility] Patient Status:  Outpatient    1935 MRN [id number]   Location MD Shonna Gonzalez MD Joenathan Nash is a [de-identified]year old female who presents to i 141 01/11/2018 01:18 PM   K 4.5 01/11/2018 01:18 PM    01/11/2018 01:18 PM   CO2 25 01/11/2018 01:18 PM    (H) 01/11/2018 01:18 PM   CA 9.6 01/11/2018 01:18 PM   ALB 3.5 10/15/2016 09:01 AM   ALKPHO 82 10/15/2016 09:01 AM   BILT 0.7 10/15/2016 procedures, hours, purpose of clinic visits, sodium restricted diet, low sodium foods, fluid restrictions, daily weights, medication regimen s/s of atrial fib/HF exacerbation and when to call APN/clinic.  Greater than 40 minutes with this patient providing gain, fever, chills or worsening symptoms. Weigh yourself daily in the morning before breakfast, call if gaining 3 lbs or more overnight or more than 5 lbs in one week.     Follow 2000 mg sodium restricted , heart healthy diet, limit daily fluids to 48-

## 2018-01-11 NOTE — PROGRESS NOTES
Initial Post Discharge Follow Up   Discharge Date: 1/4/18  Contact Date: 1/5/2018    Consent Verification:  Assessment Completed With: Patient  HIPAA Verified? Yes    Discharge Dx:     1.        Paroxysmal atrial fibrillation with rapid ventricular respo infection or virus. I'm going to the AFIB clinic today so I will ask the doctor if there is an antibiotic I can take. • Do you have any pain since discharge?   no  • When you were leaving the hospital were your discharge instructions explained to you? yes Reviewed/scheduled/rescheduled PCP TCM/HFU appointment with pt: Yes            [x]  Discharge Summary, Discharge medications reviewed/discussed/and reconciled with patient, and orders reviewed and discussed.  Any changes or updates to medications and or or

## 2018-01-15 ENCOUNTER — OFFICE VISIT (OUTPATIENT)
Dept: INTERNAL MEDICINE CLINIC | Facility: CLINIC | Age: 83
End: 2018-01-15

## 2018-01-15 VITALS
BODY MASS INDEX: 27.85 KG/M2 | WEIGHT: 188 LBS | DIASTOLIC BLOOD PRESSURE: 56 MMHG | RESPIRATION RATE: 18 BRPM | HEIGHT: 69 IN | SYSTOLIC BLOOD PRESSURE: 102 MMHG | HEART RATE: 59 BPM

## 2018-01-15 DIAGNOSIS — I50.21 ACUTE SYSTOLIC CHF (CONGESTIVE HEART FAILURE) (HCC): ICD-10-CM

## 2018-01-15 DIAGNOSIS — I48.91 ATRIAL FIBRILLATION WITH RAPID VENTRICULAR RESPONSE (HCC): Primary | ICD-10-CM

## 2018-01-15 PROBLEM — R07.9 ACUTE CHEST PAIN: Status: RESOLVED | Noted: 2017-12-31 | Resolved: 2018-01-15

## 2018-01-15 PROBLEM — R00.2 PALPITATIONS: Chronic | Status: RESOLVED | Noted: 2018-01-11 | Resolved: 2018-01-15

## 2018-01-15 PROBLEM — R09.81 SINUS CONGESTION: Status: RESOLVED | Noted: 2018-01-11 | Resolved: 2018-01-15

## 2018-01-15 PROBLEM — Z87.898 HISTORY OF SNORING: Chronic | Status: RESOLVED | Noted: 2018-01-11 | Resolved: 2018-01-15

## 2018-01-15 PROCEDURE — 99495 TRANSJ CARE MGMT MOD F2F 14D: CPT | Performed by: INTERNAL MEDICINE

## 2018-01-15 NOTE — PROGRESS NOTES
HPI:    Demetrius Gibbons is a [de-identified]year old female here today for hospital follow up.    She was discharged from Inpatient hospital, [facility] AND [facility]  to Home   Admission Date: 12/31/17   Discharge Date: 1/4/18  Hospital Discharge Diagnosis: No Value exists voltage, incomplete left bundle branch block, poor R-wave progression and nonspecific ST segment and T-wave changes.   Echo revealed mild LVH, EF 35-40%, moderate-severe MR, moderate LAE, ROBER, moderate-severe tricuspid regurgitation, and pulmonary hypertens capsule (180 mg total) by mouth daily. latanoprost 0.005 % Ophthalmic Solution Use 1 drop in both eyes at bedtime   Calcium Carb-Cholecalciferol (CALCIUM CARBONATE-VITAMIN D3 OR) Take 2 tablets by mouth daily.    Multiple Vitamin (MULTIVITAMINS) Oral Cap lb (85.3 kg)   BMI 27.76 kg/m²   HEENT: Anicteric, conjunctiva pink, no maxillary or frontal sinus tenderness, oropharynx normal  NECK: Supple without mass or lymphadenopathy  LUNGS: Resonant to percussion and clear to auscultation without crackles or whee

## 2018-01-15 NOTE — PATIENT INSTRUCTIONS
Please continue your current medications. Keep scheduled appointment with Cardiology January 24. Finish azithromycin and begin Flonase 2 sprays each nostril once daily. Return visit in 1 month.

## 2018-01-22 ENCOUNTER — HOSPITAL ENCOUNTER (INPATIENT)
Facility: HOSPITAL | Age: 83
LOS: 1 days | Discharge: HOME | DRG: 308 | End: 2018-01-23
Attending: EMERGENCY MEDICINE | Admitting: HOSPITALIST
Payer: MEDICARE

## 2018-01-22 ENCOUNTER — APPOINTMENT (OUTPATIENT)
Dept: GENERAL RADIOLOGY | Facility: HOSPITAL | Age: 83
DRG: 308 | End: 2018-01-22
Attending: EMERGENCY MEDICINE
Payer: MEDICARE

## 2018-01-22 DIAGNOSIS — I48.91 ATRIAL FIBRILLATION WITH RVR (HCC): Primary | ICD-10-CM

## 2018-01-22 LAB
ANION GAP SERPL CALC-SCNC: 9 MMOL/L (ref 0–18)
BASOPHILS # BLD: 0.1 K/UL (ref 0–0.2)
BASOPHILS NFR BLD: 1 %
BUN SERPL-MCNC: 17 MG/DL (ref 8–20)
BUN/CREAT SERPL: 17 (ref 10–20)
CALCIUM SERPL-MCNC: 8.9 MG/DL (ref 8.5–10.5)
CHLORIDE SERPL-SCNC: 107 MMOL/L (ref 95–110)
CO2 SERPL-SCNC: 22 MMOL/L (ref 22–32)
CREAT SERPL-MCNC: 1 MG/DL (ref 0.5–1.5)
EOSINOPHIL # BLD: 0.2 K/UL (ref 0–0.7)
EOSINOPHIL NFR BLD: 2 %
ERYTHROCYTE [DISTWIDTH] IN BLOOD BY AUTOMATED COUNT: 14.4 % (ref 11–15)
GLUCOSE SERPL-MCNC: 124 MG/DL (ref 70–99)
HCT VFR BLD AUTO: 41.8 % (ref 35–48)
HGB BLD-MCNC: 13.6 G/DL (ref 12–16)
LYMPHOCYTES # BLD: 1.7 K/UL (ref 1–4)
LYMPHOCYTES NFR BLD: 17 %
MCH RBC QN AUTO: 29.8 PG (ref 27–32)
MCHC RBC AUTO-ENTMCNC: 32.5 G/DL (ref 32–37)
MCV RBC AUTO: 91.6 FL (ref 80–100)
MONOCYTES # BLD: 0.8 K/UL (ref 0–1)
MONOCYTES NFR BLD: 9 %
NEUTROPHILS # BLD AUTO: 6.8 K/UL (ref 1.8–7.7)
NEUTROPHILS NFR BLD: 71 %
OSMOLALITY UR CALC.SUM OF ELEC: 289 MOSM/KG (ref 275–295)
PLATELET # BLD AUTO: 236 K/UL (ref 140–400)
PMV BLD AUTO: 8.3 FL (ref 7.4–10.3)
POTASSIUM SERPL-SCNC: 3.7 MMOL/L (ref 3.3–5.1)
RBC # BLD AUTO: 4.56 M/UL (ref 3.7–5.4)
SODIUM SERPL-SCNC: 138 MMOL/L (ref 136–144)
TROPONIN I SERPL-MCNC: 0 NG/ML (ref ?–0.03)
WBC # BLD AUTO: 9.7 K/UL (ref 4–11)

## 2018-01-22 PROCEDURE — 71045 X-RAY EXAM CHEST 1 VIEW: CPT | Performed by: EMERGENCY MEDICINE

## 2018-01-22 PROCEDURE — 99222 1ST HOSP IP/OBS MODERATE 55: CPT | Performed by: HOSPITALIST

## 2018-01-22 RX ORDER — ACETAMINOPHEN 325 MG/1
650 TABLET ORAL EVERY 4 HOURS PRN
Status: DISCONTINUED | OUTPATIENT
Start: 2018-01-22 | End: 2018-01-23

## 2018-01-22 RX ORDER — METOPROLOL TARTRATE 5 MG/5ML
5 INJECTION INTRAVENOUS ONCE
Status: COMPLETED | OUTPATIENT
Start: 2018-01-22 | End: 2018-01-22

## 2018-01-22 RX ORDER — METOPROLOL TARTRATE 50 MG/1
100 TABLET, FILM COATED ORAL ONCE
Status: COMPLETED | OUTPATIENT
Start: 2018-01-22 | End: 2018-01-22

## 2018-01-22 RX ORDER — DOCUSATE SODIUM 100 MG/1
100 CAPSULE, LIQUID FILLED ORAL 2 TIMES DAILY
Status: DISCONTINUED | OUTPATIENT
Start: 2018-01-22 | End: 2018-01-23

## 2018-01-22 RX ORDER — SODIUM CHLORIDE 0.9 % (FLUSH) 0.9 %
10 SYRINGE (ML) INJECTION AS NEEDED
Status: DISCONTINUED | OUTPATIENT
Start: 2018-01-22 | End: 2018-01-23

## 2018-01-22 RX ORDER — SODIUM PHOSPHATE, DIBASIC AND SODIUM PHOSPHATE, MONOBASIC 7; 19 G/133ML; G/133ML
1 ENEMA RECTAL ONCE AS NEEDED
Status: DISCONTINUED | OUTPATIENT
Start: 2018-01-22 | End: 2018-01-23

## 2018-01-22 RX ORDER — POLYETHYLENE GLYCOL 3350 17 G/17G
17 POWDER, FOR SOLUTION ORAL DAILY PRN
Status: DISCONTINUED | OUTPATIENT
Start: 2018-01-22 | End: 2018-01-23

## 2018-01-22 RX ORDER — SODIUM CHLORIDE 0.9 % (FLUSH) 0.9 %
3 SYRINGE (ML) INJECTION AS NEEDED
Status: DISCONTINUED | OUTPATIENT
Start: 2018-01-22 | End: 2018-01-23

## 2018-01-22 RX ORDER — SODIUM CHLORIDE 9 MG/ML
INJECTION, SOLUTION INTRAVENOUS CONTINUOUS
Status: DISCONTINUED | OUTPATIENT
Start: 2018-01-23 | End: 2018-01-23

## 2018-01-22 RX ORDER — DILTIAZEM HYDROCHLORIDE 180 MG/1
180 CAPSULE, COATED, EXTENDED RELEASE ORAL DAILY
Status: DISCONTINUED | OUTPATIENT
Start: 2018-01-23 | End: 2018-01-23

## 2018-01-22 RX ORDER — DOXEPIN HYDROCHLORIDE 50 MG/1
1 CAPSULE ORAL DAILY
Status: DISCONTINUED | OUTPATIENT
Start: 2018-01-22 | End: 2018-01-23

## 2018-01-22 RX ORDER — BISACODYL 10 MG
10 SUPPOSITORY, RECTAL RECTAL
Status: DISCONTINUED | OUTPATIENT
Start: 2018-01-22 | End: 2018-01-23

## 2018-01-22 RX ORDER — HYDROCODONE BITARTRATE AND ACETAMINOPHEN 5; 325 MG/1; MG/1
2 TABLET ORAL EVERY 4 HOURS PRN
Status: DISCONTINUED | OUTPATIENT
Start: 2018-01-22 | End: 2018-01-23

## 2018-01-22 RX ORDER — METOPROLOL TARTRATE 100 MG/1
100 TABLET ORAL 2 TIMES DAILY
Status: DISCONTINUED | OUTPATIENT
Start: 2018-01-22 | End: 2018-01-23

## 2018-01-22 RX ORDER — HYDROCODONE BITARTRATE AND ACETAMINOPHEN 5; 325 MG/1; MG/1
1 TABLET ORAL EVERY 4 HOURS PRN
Status: DISCONTINUED | OUTPATIENT
Start: 2018-01-22 | End: 2018-01-23

## 2018-01-22 RX ORDER — ONDANSETRON 2 MG/ML
4 INJECTION INTRAMUSCULAR; INTRAVENOUS EVERY 6 HOURS PRN
Status: DISCONTINUED | OUTPATIENT
Start: 2018-01-22 | End: 2018-01-23

## 2018-01-22 RX ORDER — DILTIAZEM HYDROCHLORIDE 180 MG/1
180 CAPSULE, COATED, EXTENDED RELEASE ORAL ONCE
Status: COMPLETED | OUTPATIENT
Start: 2018-01-22 | End: 2018-01-22

## 2018-01-22 NOTE — HISTORICAL OFFICE NOTE
MAY TAYLOR  [phone]  : 1935  ACCOUNT:  [id number]  PCP: Hamida De Paz M.D. CARDIOLOGIST: Lula Henao M.D.   Hospital:  [facility]  Admitted:  2017  Discharged:  2018      DISCHARGE SUMMARY    HOSPITAL COURSE:  The yolanda Teto Armstrong is here for a followup appointment. She is a 72-year-old  female who has history of paroxysmal atrial fibrillation in remote past, SVT, and PACs. Symptomatically, she is doing well.   She occasionally continues to have fluttering in the OCCUPATION: teacher.     ALLERGIES: Sulfa Antibiotics - CLASS    MEDICATIONS: Selected prescriptions see below    VITAL SIGNS: [B/P - 104/68 , Pulse - 64, Weight -  185, Height -   70 , BMI - 26.5 ]    CV: PALP: no lifts, thrills or rub and PMI not displace The calculated ejection fraction was 57%. PERFUSION IMAGING: At peak exercise, perfusion images demonstrated normal perfusion. Resting images were unchanged.      Nuclear Cardiology:   IV SITE: Right metacarpal, by LT  REST DOSE: 8.6 mCi Tc-99m Morenita Kind

## 2018-01-22 NOTE — H&P
[address] [facility] [address] Patient Status:  Inpatient    1935 MRN [id number]   Location [facility] 3W/SW Attending Dada Toro MD   Hosp Day # 0 PCP Igor Posadas MD     Date:  2018  Date radius fracture  1976: TUBAL LIGATION  Family History   Problem Relation Age of Onset   • Diabetes Father    • Pancreatic Cancer [OTHER] Father    • Renal Cell Carcinoma [OTHER] Brother    • Crohn's Disease Brother    • Glaucoma Mother    • Glaucoma Patern full range of motion in all the extremities. EXTREMITIES: There was no edema, clubbing or cyanosis  NEUROLOGICAL:  There was no focal deficit. Cranial nerves II through XII were intact.       Cervical Papanicolaou to be done in MD's office    Results: clinical documentation in H+P. Based on patients current state of illness, I anticipate that, after discharge, patient will require TBD.

## 2018-01-22 NOTE — ED PROVIDER NOTES
Patient Seen in: [facility] AND [facility] Emergency Department    History   Patient presents with:  Arrythmia/Palpitations (cardiovascular)    Stated Complaint: palpitations    HPI    Patient presents with atrial fibrillation again.   She was admitted here [facility] tablet (5 mg total) by mouth 2 (two) times daily. Metoprolol Tartrate 100 MG Oral Tab,  Take 1 tablet (100 mg total) by mouth 2 (two) times daily.    DilTIAZem HCl ER Coated Beads 180 MG Oral Capsule SR 24 Hr,  Take 1 capsule (180 mg total) by mouth daily Exam  Constitutional:  Alert, well nourished adult lying in bed in no distress. Vital signs noted. Eye:  No scleral icterus. Eyelids appear normal, no lesions.   Cardiovascular:  Normal S1 and S2, no murmur, irregularly irregular, with good peripheral pe RAINBOW DRAW LIGHT GREEN   RAINBOW DRAW GOLD   RAINBOW DRAW LAVENDER TALL (BNP)   CBC W/ DIFFERENTIAL     EKG    Rate, intervals and axes as noted on EKG Report.   Rate: EKG shows rate 113 atrial fibrillation left bundle branch block noted no acute proces

## 2018-01-22 NOTE — CONSULTS
[facility] AND [facility]  Cardiology Consultation    Job Huh Patient Status:  Emergency    1935 MRN [id number]   Location [address] Attending Miguel Goodrich MD   Hosp Day # 0 PCP Pinky Dye MD     Reason for Con Brother    • Crohn's Disease Brother    • Glaucoma Mother    • Glaucoma Paternal Aunt    • Macular degeneration Paternal Aunt       reports that she has never smoked.  She has never used smokeless tobacco. She reports that she drinks about 0.5 oz of alcohol fib with RVR  Symptomatic  LV dysfunction  Nonobstructive CAD    Recommendations:  Continue current medication including Eliquis  If no spontaneous conversion to normal sinus by tomorrow, plan on JAI and cardioversion. Start Multaq after cardioversion.

## 2018-01-22 NOTE — ED INITIAL ASSESSMENT (HPI)
C/o palpitations since last night. Hx of afib with recent hospitalization and medication changes.  Denies pain, c/o nausea

## 2018-01-23 ENCOUNTER — APPOINTMENT (OUTPATIENT)
Dept: INTERVENTIONAL RADIOLOGY/VASCULAR | Facility: HOSPITAL | Age: 83
DRG: 308 | End: 2018-01-23
Attending: NURSE PRACTITIONER
Payer: MEDICARE

## 2018-01-23 ENCOUNTER — APPOINTMENT (OUTPATIENT)
Dept: CV DIAGNOSTICS | Facility: HOSPITAL | Age: 83
DRG: 308 | End: 2018-01-23
Attending: NURSE PRACTITIONER
Payer: MEDICARE

## 2018-01-23 ENCOUNTER — PRIOR ORIGINAL RECORDS (OUTPATIENT)
Dept: OTHER | Age: 83
End: 2018-01-23

## 2018-01-23 VITALS
WEIGHT: 189 LBS | OXYGEN SATURATION: 93 % | RESPIRATION RATE: 20 BRPM | DIASTOLIC BLOOD PRESSURE: 61 MMHG | HEART RATE: 68 BPM | HEIGHT: 71 IN | BODY MASS INDEX: 26.46 KG/M2 | SYSTOLIC BLOOD PRESSURE: 111 MMHG | TEMPERATURE: 98 F

## 2018-01-23 LAB
ANION GAP SERPL CALC-SCNC: 7 MMOL/L (ref 0–18)
BASOPHILS # BLD: 0.1 K/UL (ref 0–0.2)
BASOPHILS NFR BLD: 1 %
BUN SERPL-MCNC: 19 MG/DL (ref 8–20)
BUN/CREAT SERPL: 19.6 (ref 10–20)
CALCIUM SERPL-MCNC: 8.7 MG/DL (ref 8.5–10.5)
CHLORIDE SERPL-SCNC: 107 MMOL/L (ref 95–110)
CO2 SERPL-SCNC: 25 MMOL/L (ref 22–32)
CREAT SERPL-MCNC: 0.97 MG/DL (ref 0.5–1.5)
EOSINOPHIL # BLD: 0.4 K/UL (ref 0–0.7)
EOSINOPHIL NFR BLD: 5 %
ERYTHROCYTE [DISTWIDTH] IN BLOOD BY AUTOMATED COUNT: 14.7 % (ref 11–15)
GLUCOSE SERPL-MCNC: 102 MG/DL (ref 70–99)
HCT VFR BLD AUTO: 40.2 % (ref 35–48)
HGB BLD-MCNC: 13.1 G/DL (ref 12–16)
LYMPHOCYTES # BLD: 2 K/UL (ref 1–4)
LYMPHOCYTES NFR BLD: 26 %
MAGNESIUM SERPL-MCNC: 2.1 MG/DL (ref 1.8–2.5)
MCH RBC QN AUTO: 29.9 PG (ref 27–32)
MCHC RBC AUTO-ENTMCNC: 32.7 G/DL (ref 32–37)
MCV RBC AUTO: 91.6 FL (ref 80–100)
MONOCYTES # BLD: 0.7 K/UL (ref 0–1)
MONOCYTES NFR BLD: 10 %
NEUTROPHILS # BLD AUTO: 4.5 K/UL (ref 1.8–7.7)
NEUTROPHILS NFR BLD: 59 %
OSMOLALITY UR CALC.SUM OF ELEC: 290 MOSM/KG (ref 275–295)
PLATELET # BLD AUTO: 213 K/UL (ref 140–400)
PMV BLD AUTO: 8.3 FL (ref 7.4–10.3)
POTASSIUM SERPL-SCNC: 3.8 MMOL/L (ref 3.3–5.1)
RBC # BLD AUTO: 4.39 M/UL (ref 3.7–5.4)
SODIUM SERPL-SCNC: 139 MMOL/L (ref 136–144)
WBC # BLD AUTO: 7.7 K/UL (ref 4–11)

## 2018-01-23 PROCEDURE — B246ZZ4 ULTRASONOGRAPHY OF RIGHT AND LEFT HEART, TRANSESOPHAGEAL: ICD-10-PCS | Performed by: INTERNAL MEDICINE

## 2018-01-23 PROCEDURE — 93325 DOPPLER ECHO COLOR FLOW MAPG: CPT | Performed by: NURSE PRACTITIONER

## 2018-01-23 PROCEDURE — 99239 HOSP IP/OBS DSCHRG MGMT >30: CPT | Performed by: HOSPITALIST

## 2018-01-23 PROCEDURE — 93320 DOPPLER ECHO COMPLETE: CPT | Performed by: NURSE PRACTITIONER

## 2018-01-23 PROCEDURE — 5A2204Z RESTORATION OF CARDIAC RHYTHM, SINGLE: ICD-10-PCS | Performed by: INTERNAL MEDICINE

## 2018-01-23 RX ORDER — MIDAZOLAM HYDROCHLORIDE 1 MG/ML
INJECTION INTRAMUSCULAR; INTRAVENOUS
Status: COMPLETED
Start: 2018-01-23 | End: 2018-01-23

## 2018-01-23 RX ORDER — MIDAZOLAM HYDROCHLORIDE 1 MG/ML
2 INJECTION INTRAMUSCULAR; INTRAVENOUS ONCE
Status: COMPLETED | OUTPATIENT
Start: 2018-01-23 | End: 2018-01-23

## 2018-01-23 RX ORDER — METOPROLOL TARTRATE 100 MG/1
50 TABLET ORAL 2 TIMES DAILY
Qty: 60 TABLET | Refills: 0 | Status: SHIPPED | OUTPATIENT
Start: 2018-01-23 | End: 2018-09-24 | Stop reason: ALTCHOICE

## 2018-01-23 RX ORDER — POTASSIUM CHLORIDE 20 MEQ/1
40 TABLET, EXTENDED RELEASE ORAL ONCE
Status: COMPLETED | OUTPATIENT
Start: 2018-01-23 | End: 2018-01-23

## 2018-01-23 NOTE — TRANSITION NOTE
[place] [facility] - [facility]    Cardiology Discharge Summary    Hernando Serrano Patient Status:  Inpatient    1935 MRN [id number]   Location [facility] 3W/SW Attending Samia Sutton MD   Hosp Day # 1 PCP Chilango Ragsdale MD       Subjective:

## 2018-01-23 NOTE — PROGRESS NOTES
Pt is holding sinus, but is running in the 50's with borderline low blood pressures. She is assymptomatic. I will decrease her metoprolol from 100 bid to 50 bid. I will stop her diltiazem. She has been started on Multaq 400 mg po BID.     She will a

## 2018-01-23 NOTE — PROGRESS NOTES
Pt s/p JAI/CV. Tolerated procedure well. Converted to NSR w/ 1 synchronized shock at 150j. Pt hemodynamically and neurologically stable on room air.   Report called to floor RN

## 2018-01-23 NOTE — PLAN OF CARE
PT. ADMITTED TODAY WITH YONI DENNEY, PLAN FOR JAI AND CARDIOVERSION TOMORROW IF SHE DOES NOT CONVERT TO NSR ON HER OWN TONIGHT

## 2018-01-23 NOTE — PROCEDURES
JAI:  LV EF 40%  RV normal function    Mitral moderate to severe MR  Tricuspid mild TR  Pulmonic normal  Aortic mildly calcified    SHAHBAZ no clot, decreased velocity  No PFO/ASD bubble study negative    DC CV 150J delivered x1--> converted to NSR.       Start

## 2018-01-23 NOTE — CM/SW NOTE
Explanation of of BPCI/Medicare program provided. Patient was enrolled under . Patient/family agreed to phone f/u for 3 months from [address] after discharge from [address] [place] [address]. BPCI/Medicare letter and brochure provided.     Palak Bray RN, CTL

## 2018-01-23 NOTE — PLAN OF CARE
DISCHARGE PLANNING    • Discharge to home or other facility with appropriate resources Completed        PAIN - ADULT    • Verbalizes/displays adequate comfort level or patient's stated pain goal Completed        RISK FOR INFECTION - ADULT    • Absence of f

## 2018-01-23 NOTE — PROGRESS NOTES
[facility] - [facility]    Progress Note    Job Huh Patient Status:  Inpatient    1935 MRN [id number]   Location [facility] 3W/SW Attending Miguel Goodrich MD   Hosp Day # 1 PCP Pinky Dye MD       Subjective:   Seth Lambert Persistent moderate bibasilar airspace disease with small bilateral pleural effusions which may have decreased somewhat since previous study. Can't exclude bibasilar pneumonia. Mild cardiomegaly and mild pulmonary congestive changes. Correlate clinically.

## 2018-01-23 NOTE — DISCHARGE SUMMARY
[facility]    Discharge Summary    Tj Druze Patient Status:  Inpatient    1935 MRN [id number]   Location [facility] 3W/SW Attending Jaci Marley MD   Hosp Day # 1 PCP Hamida De Paz MD     Date of Admission:  murmur, click, rub or gallop, regular rate and rhythm  Abdominal: soft, non-tender; bowel sounds normal; no masses,  no organomegaly  Extremities: extremities normal, atraumatic, no cyanosis or edema  Psychiatric: calm        History of Present Illness: 0     latanoprost 0.005 % Soln  Commonly known as:  XALATAN      Use 1 drop in both eyes at bedtime   Quantity:  3 Bottle  Refills:  3     MULTIVITAMINS Caps      Take 1 capsule by mouth daily.    Refills:  0        STOP taking these medications    ScottyTIAZ

## 2018-01-24 ENCOUNTER — TELEPHONE (OUTPATIENT)
Dept: INTERNAL MEDICINE UNIT | Facility: HOSPITAL | Age: 83
End: 2018-01-24

## 2018-01-24 ENCOUNTER — TELEPHONE (OUTPATIENT)
Dept: CARDIOLOGY UNIT | Facility: HOSPITAL | Age: 83
End: 2018-01-24

## 2018-01-24 ENCOUNTER — PATIENT OUTREACH (OUTPATIENT)
Dept: CASE MANAGEMENT | Age: 83
End: 2018-01-24

## 2018-01-24 NOTE — PROGRESS NOTES
S/w patient. She confirmed her medication changes and believes that the Multaq is working better for her than the Cardizem did. She states that she is feeling pretty good now and the symptoms that brought her to the ER are now gone.  I instructed her to con

## 2018-01-29 NOTE — PROGRESS NOTES
[address] [facility] Patient Status:  Outpatient    1935 MRN [id number]   Location MD Jennifer Clements MD Ralston Martini is a [de-identified]year old female who presents to i Date/Time   WBC 7.7 01/23/2018 05:37 AM   HGB 13.1 01/23/2018 05:37 AM   HCT 40.2 01/23/2018 05:37 AM    01/23/2018 05:37 AM   CREATSERUM 1.24 01/30/2018 08:49 AM   BUN 14 01/30/2018 08:49 AM    01/30/2018 08:49 AM   K 3.9 01/30/2018 08:49 AM 40%    Diagnostic tests:   CXR: 12.31.17 Bibasilar pulmonary opacities with pleural effusions. The opacities could represent atelectasis however correlate for pneumonia/infiltrate.       Education:  Patient  instructed at length regarding clinic procedures sodium heart healthy diet, keep fluids at 48-64 oz/day, call with weight gain or worsening symptoms. Consider sleep study with history of snoring.         Plan:  Patient Instructions     Continue all your same medications    Call if having any dizziness, li

## 2018-01-30 ENCOUNTER — PRIOR ORIGINAL RECORDS (OUTPATIENT)
Dept: OTHER | Age: 83
End: 2018-01-30

## 2018-01-30 ENCOUNTER — OFFICE VISIT (OUTPATIENT)
Dept: INTERNAL MEDICINE CLINIC | Facility: CLINIC | Age: 83
End: 2018-01-30

## 2018-01-30 ENCOUNTER — OFFICE VISIT (OUTPATIENT)
Dept: CARDIOLOGY CLINIC | Facility: HOSPITAL | Age: 83
End: 2018-01-30
Attending: INTERNAL MEDICINE
Payer: MEDICARE

## 2018-01-30 VITALS
DIASTOLIC BLOOD PRESSURE: 68 MMHG | HEIGHT: 69 IN | HEART RATE: 62 BPM | BODY MASS INDEX: 27.55 KG/M2 | SYSTOLIC BLOOD PRESSURE: 112 MMHG | WEIGHT: 186 LBS

## 2018-01-30 VITALS
HEART RATE: 68 BPM | WEIGHT: 186 LBS | SYSTOLIC BLOOD PRESSURE: 130 MMHG | OXYGEN SATURATION: 94 % | DIASTOLIC BLOOD PRESSURE: 63 MMHG | BODY MASS INDEX: 26 KG/M2

## 2018-01-30 DIAGNOSIS — I44.7 LEFT BUNDLE BRANCH BLOCK: ICD-10-CM

## 2018-01-30 DIAGNOSIS — R00.2 PALPITATIONS: ICD-10-CM

## 2018-01-30 DIAGNOSIS — I48.19 PERSISTENT ATRIAL FIBRILLATION (HCC): ICD-10-CM

## 2018-01-30 DIAGNOSIS — I51.9 LV DYSFUNCTION: ICD-10-CM

## 2018-01-30 DIAGNOSIS — I34.0 MITRAL VALVE INSUFFICIENCY, UNSPECIFIED ETIOLOGY: ICD-10-CM

## 2018-01-30 DIAGNOSIS — I48.91 ATRIAL FIBRILLATION WITH RVR (HCC): Primary | ICD-10-CM

## 2018-01-30 DIAGNOSIS — I48.91 ATRIAL FIBRILLATION WITH RVR (HCC): ICD-10-CM

## 2018-01-30 DIAGNOSIS — I48.91 ATRIAL FIBRILLATION (HCC): Primary | ICD-10-CM

## 2018-01-30 DIAGNOSIS — I50.22 CHRONIC SYSTOLIC HEART FAILURE (HCC): ICD-10-CM

## 2018-01-30 DIAGNOSIS — Z98.890 HISTORY OF CARDIOVERSION: ICD-10-CM

## 2018-01-30 PROBLEM — Z92.89 HISTORY OF CARDIOVERSION: Status: ACTIVE | Noted: 2018-01-23

## 2018-01-30 LAB
ANION GAP SERPL CALC-SCNC: 11 MMOL/L (ref 0–18)
BUN SERPL-MCNC: 14 MG/DL (ref 8–20)
BUN/CREAT SERPL: 11.3 (ref 10–20)
CALCIUM SERPL-MCNC: 9.1 MG/DL (ref 8.5–10.5)
CHLORIDE SERPL-SCNC: 104 MMOL/L (ref 95–110)
CO2 SERPL-SCNC: 25 MMOL/L (ref 22–32)
CREAT SERPL-MCNC: 1.24 MG/DL (ref 0.5–1.5)
GLUCOSE SERPL-MCNC: 118 MG/DL (ref 70–99)
OSMOLALITY UR CALC.SUM OF ELEC: 292 MOSM/KG (ref 275–295)
POTASSIUM SERPL-SCNC: 3.9 MMOL/L (ref 3.3–5.1)
SODIUM SERPL-SCNC: 140 MMOL/L (ref 136–144)

## 2018-01-30 PROCEDURE — G0463 HOSPITAL OUTPT CLINIC VISIT: HCPCS | Performed by: INTERNAL MEDICINE

## 2018-01-30 PROCEDURE — 99213 OFFICE O/P EST LOW 20 MIN: CPT | Performed by: INTERNAL MEDICINE

## 2018-01-30 PROCEDURE — 80048 BASIC METABOLIC PNL TOTAL CA: CPT | Performed by: NURSE PRACTITIONER

## 2018-01-30 PROCEDURE — 93005 ELECTROCARDIOGRAM TRACING: CPT

## 2018-01-30 PROCEDURE — 93010 ELECTROCARDIOGRAM REPORT: CPT | Performed by: NURSE PRACTITIONER

## 2018-01-30 PROCEDURE — 36415 COLL VENOUS BLD VENIPUNCTURE: CPT | Performed by: NURSE PRACTITIONER

## 2018-01-30 PROCEDURE — 99212 OFFICE O/P EST SF 10 MIN: CPT | Performed by: NURSE PRACTITIONER

## 2018-01-30 PROCEDURE — 99214 OFFICE O/P EST MOD 30 MIN: CPT | Performed by: NURSE PRACTITIONER

## 2018-01-30 NOTE — PROGRESS NOTES
Cam Chambers is a [de-identified]year old female. Patient presents with:  Hospital F/U: Pt stts that she was admitted to [address] [place] [address] on 1/22/18 due to afib    HPI:   Ms. Natalio Parisi presents this morning for hospital follow-up.     She was readmitted to [facility] on [facility] Vitamin (MULTIVITAMINS) Oral Cap Take 1 capsule by mouth daily.  Disp:  Rfl:        Sulfa Antibiotics       Hives   Past Medical History:   Diagnosis Date   • Acute systolic CHF (congestive heart failure) ([facility] 12/2017   • Aortic atherosclerosis ([facility]     C nontender      ASSESSMENT AND PLAN:   1. Atrial fibrillation with RVR (HCC)  Now maintaining sinus rhythm status post recent successful cardioversion. Continue current medications. Upcoming appointment with Cardiology.       The patient indicates Ayla

## 2018-01-30 NOTE — PATIENT INSTRUCTIONS
Continue all your same medications    Call if having any dizziness, lightheadedness, heart racing, palpitations, chest pain, shortness of breath, coughing, swelling, weight gain, fever, chills or worsening symptoms.      Weigh yourself daily in the mornin

## 2018-02-01 LAB
BUN: 14 MG/DL
BUN: 19 MG/DL
CALCIUM: 8.7 MG/DL
CALCIUM: 9.1 MG/DL
CHLORIDE: 104 MEQ/L
CHLORIDE: 107 MEQ/L
CREATININE, SERUM: 0.97 MG/DL
CREATININE, SERUM: 1.24 MG/DL
GLUCOSE: 102 MG/DL
GLUCOSE: 118 MG/DL
MAGNESIUM: 2.1 MG/DL
POTASSIUM, SERUM: 3.9 MEQ/L
SODIUM: 139 MEQ/L
SODIUM: 140 MEQ/L

## 2018-02-02 ENCOUNTER — PRIOR ORIGINAL RECORDS (OUTPATIENT)
Dept: OTHER | Age: 83
End: 2018-02-02

## 2018-03-28 ENCOUNTER — PATIENT OUTREACH (OUTPATIENT)
Dept: CASE MANAGEMENT | Age: 83
End: 2018-03-28

## 2018-03-28 NOTE — PROGRESS NOTES
Outreached to patient in regards to enrollment to Chronic Care Management program. Left message for patient to return my call at ext. [phone]. Thank you.

## 2018-04-04 ENCOUNTER — PATIENT OUTREACH (OUTPATIENT)
Dept: CASE MANAGEMENT | Age: 83
End: 2018-04-04

## 2018-04-04 NOTE — PROGRESS NOTES
Outreached to patient a second time in regards to enrollment to Chronic Care Management program. Left message for patient to return my call at ext. [phone]. Thank you.

## 2018-04-10 ENCOUNTER — OFFICE VISIT (OUTPATIENT)
Dept: OPHTHALMOLOGY | Facility: CLINIC | Age: 83
End: 2018-04-10

## 2018-04-10 DIAGNOSIS — H40.1132 PRIMARY OPEN ANGLE GLAUCOMA OF BOTH EYES, MODERATE STAGE: Primary | ICD-10-CM

## 2018-04-10 PROCEDURE — 99213 OFFICE O/P EST LOW 20 MIN: CPT | Performed by: OPHTHALMOLOGY

## 2018-04-10 PROCEDURE — G0463 HOSPITAL OUTPT CLINIC VISIT: HCPCS | Performed by: OPHTHALMOLOGY

## 2018-04-10 NOTE — PROGRESS NOTES
Lisa Miranda is a [de-identified]year old female. HPI:     HPI     Here for an IOP check. Pt has been using Latanoprost OU qhs as directed.  ( Pt had to Cxl her VF and OCT no MD apt in January 2018 due to being in the hospital; pt would like to reschedule that t • Glaucoma Mother    • Glaucoma Paternal Aunt    • Macular degeneration Paternal Aunt        Social History: Smoking status: Never Smoker                                                              Smokeless tobacco: Never Used                      Alco Primary open angle glaucoma of both eyes, moderate stage  Discussed with patient Intraocular pressure stable, tolerating medications. Will continue same regimen of Latanoprost in both eyes at bedtime.    Will have patient back in 4 months for a visual

## 2018-04-10 NOTE — PATIENT INSTRUCTIONS
Primary open angle glaucoma of both eyes, moderate stage  Discussed with patient Intraocular pressure stable, tolerating medications. Will continue same regimen of Latanoprost in both eyes at bedtime.    Will have patient back in 4 months for a visual fiel

## 2018-04-18 NOTE — PROGRESS NOTES
Outreached to patient a 3rd time in regards to enrollment to Chronic Care Management program. Left message for patient to return my call at ext. [phone]. Thank you.

## 2018-06-21 ENCOUNTER — LAB ENCOUNTER (OUTPATIENT)
Dept: LAB | Facility: HOSPITAL | Age: 83
End: 2018-06-21
Attending: INTERNAL MEDICINE
Payer: MEDICARE

## 2018-06-21 ENCOUNTER — NURSE TRIAGE (OUTPATIENT)
Dept: OTHER | Age: 83
End: 2018-06-21

## 2018-06-21 DIAGNOSIS — R30.0 DYSURIA: ICD-10-CM

## 2018-06-21 DIAGNOSIS — R30.0 DYSURIA: Primary | ICD-10-CM

## 2018-06-21 PROCEDURE — 87086 URINE CULTURE/COLONY COUNT: CPT

## 2018-06-21 PROCEDURE — 81001 URINALYSIS AUTO W/SCOPE: CPT

## 2018-06-21 RX ORDER — NITROFURANTOIN 25; 75 MG/1; MG/1
100 CAPSULE ORAL 2 TIMES DAILY
Qty: 10 CAPSULE | Refills: 0 | Status: SHIPPED | OUTPATIENT
Start: 2018-06-21 | End: 2018-09-24

## 2018-06-21 NOTE — TELEPHONE ENCOUNTER
Action Requested: Summary for Provider     []  Critical Lab, Recommendations Needed  [] Need Additional Advice  []   FYI    []   Need Orders  [] Need Medications Sent to Pharmacy  []  Other     SUMMARY: Medardo Arndt pt stated that her s/sx started this morning

## 2018-07-17 ENCOUNTER — MYAURORA ACCOUNT LINK (OUTPATIENT)
Dept: OTHER | Age: 83
End: 2018-07-17

## 2018-07-17 ENCOUNTER — PRIOR ORIGINAL RECORDS (OUTPATIENT)
Dept: OTHER | Age: 83
End: 2018-07-17

## 2018-08-31 ENCOUNTER — PRIOR ORIGINAL RECORDS (OUTPATIENT)
Dept: OTHER | Age: 83
End: 2018-08-31

## 2018-09-24 ENCOUNTER — OFFICE VISIT (OUTPATIENT)
Dept: INTERNAL MEDICINE CLINIC | Facility: CLINIC | Age: 83
End: 2018-09-24
Payer: MEDICARE

## 2018-09-24 VITALS
BODY MASS INDEX: 27.07 KG/M2 | SYSTOLIC BLOOD PRESSURE: 120 MMHG | TEMPERATURE: 98 F | WEIGHT: 193.38 LBS | HEART RATE: 50 BPM | DIASTOLIC BLOOD PRESSURE: 78 MMHG | HEIGHT: 71 IN

## 2018-09-24 DIAGNOSIS — R51.9 SCALP PAIN: Primary | ICD-10-CM

## 2018-09-24 PROCEDURE — G0463 HOSPITAL OUTPT CLINIC VISIT: HCPCS | Performed by: INTERNAL MEDICINE

## 2018-09-24 PROCEDURE — 99213 OFFICE O/P EST LOW 20 MIN: CPT | Performed by: INTERNAL MEDICINE

## 2018-09-24 RX ORDER — METOPROLOL TARTRATE 50 MG/1
50 TABLET, FILM COATED ORAL 2 TIMES DAILY
COMMUNITY

## 2018-09-24 NOTE — PROGRESS NOTES
Rosie Mccloud is a 82year old female. Patient presents with:  Nasal Congestion: Pt has had nasal congestion and pain along the right side of temple, that can radiate to ear. Pain is intermittent, pt has been trying Tylenol PRN.  Pt states has been going 35-40%; cardiac cath 1-18 with EF 40%  No date: Mitral regurgitation      Comment:  Echo 1-18 with moderate-severe MR and TR; JAI 1-18 with                moderate-severe MR  September 2011: Osteopenia      Comment:  T score -1.3 hip and -0.7 lumbar spine

## 2018-09-28 ENCOUNTER — TELEPHONE (OUTPATIENT)
Dept: OTHER | Age: 83
End: 2018-09-28

## 2018-09-28 ENCOUNTER — OFFICE VISIT (OUTPATIENT)
Dept: INTERNAL MEDICINE CLINIC | Facility: CLINIC | Age: 83
End: 2018-09-28
Payer: MEDICARE

## 2018-09-28 VITALS
WEIGHT: 194 LBS | TEMPERATURE: 97 F | BODY MASS INDEX: 28.73 KG/M2 | HEIGHT: 69 IN | HEART RATE: 51 BPM | SYSTOLIC BLOOD PRESSURE: 128 MMHG | DIASTOLIC BLOOD PRESSURE: 72 MMHG

## 2018-09-28 DIAGNOSIS — R21 RASH: Primary | ICD-10-CM

## 2018-09-28 PROCEDURE — 99213 OFFICE O/P EST LOW 20 MIN: CPT | Performed by: INTERNAL MEDICINE

## 2018-09-28 PROCEDURE — G0463 HOSPITAL OUTPT CLINIC VISIT: HCPCS | Performed by: INTERNAL MEDICINE

## 2018-09-28 NOTE — TELEPHONE ENCOUNTER
If she had shingles as an explanation for her scalp pain, blisters would be on her scalp, not her leg. Recommend appointment with me for evaluation.

## 2018-09-28 NOTE — PROGRESS NOTES
Guicho Mcconnell is a 82year old female. Patient presents with:  Blisters: C/o 5-7 blisters on her left leg. Denies of any pain or burning feelings. HPI:   Since her last visit, right scalp pain is somewhat better.   Recently however she has noticed a Comment:  Ruptured appendicitis  01/2018: CARDIOVERSION      Comment:  Symptomatic atrial fibrillation  4/13/17: CATARACT EXTRACTION W/  INTRAOCULAR LENS IMPLANT;  Right      Comment:  Dr. Hien Griffin @ [address] 06/2017: CATARACT EXTRACTION W/  INTRAOCULAR LENS IMPL

## 2018-09-28 NOTE — PATIENT INSTRUCTIONS
Please let me know if your rash does not soon go away. Please see Neurology if your scalp pain persists.

## 2018-09-28 NOTE — TELEPHONE ENCOUNTER
Spoke with patient--states she noticed 5 small blisters to her left leg Tuesday (OV with MN Monday)--shooting pain 8/10 \"when the Tylenol wears off. \"    Patient feels she has Shingles--has not yet made appt with Neurology.     Patient denies blisters to t

## 2018-11-07 ENCOUNTER — TELEPHONE (OUTPATIENT)
Dept: NEUROLOGY | Facility: CLINIC | Age: 83
End: 2018-11-07

## 2018-11-07 ENCOUNTER — OFFICE VISIT (OUTPATIENT)
Dept: NEUROLOGY | Facility: CLINIC | Age: 83
End: 2018-11-07
Payer: MEDICARE

## 2018-11-07 ENCOUNTER — APPOINTMENT (OUTPATIENT)
Dept: LAB | Facility: HOSPITAL | Age: 83
End: 2018-11-07
Attending: Other
Payer: MEDICARE

## 2018-11-07 VITALS
DIASTOLIC BLOOD PRESSURE: 70 MMHG | HEIGHT: 71 IN | WEIGHT: 190 LBS | HEART RATE: 56 BPM | SYSTOLIC BLOOD PRESSURE: 100 MMHG | BODY MASS INDEX: 26.6 KG/M2 | RESPIRATION RATE: 16 BRPM

## 2018-11-07 DIAGNOSIS — R51.9 HEADACHE DISORDER: Primary | ICD-10-CM

## 2018-11-07 DIAGNOSIS — R51.9 HEADACHE DISORDER: ICD-10-CM

## 2018-11-07 PROCEDURE — 85652 RBC SED RATE AUTOMATED: CPT

## 2018-11-07 PROCEDURE — 36415 COLL VENOUS BLD VENIPUNCTURE: CPT

## 2018-11-07 PROCEDURE — 99203 OFFICE O/P NEW LOW 30 MIN: CPT | Performed by: OTHER

## 2018-11-07 RX ORDER — ACETAMINOPHEN 500 MG
500 TABLET ORAL AS NEEDED
COMMUNITY

## 2018-11-07 NOTE — PROGRESS NOTES
Ms Alice Turpin, relates around September 20 onset of vertex to right temporal sharp stabbing pain which lasted about 2 weeks. This is resolved. Over the last week she has had left temporal sharp stabbing pain. No jaw claudication. No nocturnal awakening.   No

## 2018-11-07 NOTE — TELEPHONE ENCOUNTER
L/M on Lindsey's v/m advising Pt's  insurance was verified and CT brain/head wo is a covered benefit and does not require authorization. Can proceed with scheduling appt. Moo Chaney

## 2018-11-09 ENCOUNTER — HOSPITAL ENCOUNTER (OUTPATIENT)
Dept: CT IMAGING | Facility: HOSPITAL | Age: 83
Discharge: HOME | End: 2018-11-09
Attending: Other
Payer: MEDICARE

## 2018-11-09 DIAGNOSIS — R51.9 HEADACHE DISORDER: ICD-10-CM

## 2018-11-09 PROCEDURE — 70450 CT HEAD/BRAIN W/O DYE: CPT | Performed by: OTHER

## 2018-11-11 ENCOUNTER — LAB ENCOUNTER (OUTPATIENT)
Dept: LAB | Facility: HOSPITAL | Age: 83
End: 2018-11-11
Attending: INTERNAL MEDICINE
Payer: MEDICARE

## 2018-11-11 DIAGNOSIS — E78.00 PURE HYPERCHOLESTEROLEMIA: Primary | ICD-10-CM

## 2018-11-11 PROCEDURE — 36415 COLL VENOUS BLD VENIPUNCTURE: CPT

## 2018-11-11 PROCEDURE — 80061 LIPID PANEL: CPT

## 2018-11-14 ENCOUNTER — TELEPHONE (OUTPATIENT)
Dept: NEUROLOGY | Facility: CLINIC | Age: 83
End: 2018-11-14

## 2018-11-14 NOTE — TELEPHONE ENCOUNTER
Patient informed of the normal CT Brain findings as documented by Dr. Griselda Rust.  Patient verbalized understanding and was thankful

## 2018-11-16 ENCOUNTER — TELEPHONE (OUTPATIENT)
Dept: OPHTHALMOLOGY | Facility: CLINIC | Age: 83
End: 2018-11-16

## 2018-11-16 NOTE — TELEPHONE ENCOUNTER
Moved apt to 10:30 same day for testing. LM in detail to call back to confirm that this works for her.

## 2018-11-16 NOTE — TELEPHONE ENCOUNTER
Pt called stating pt is returning the call regarding appt on 11-20-18 at 2:15 pm for a vf, oct and iop. appt was booked in error.   Please call pt to advise

## 2018-11-20 ENCOUNTER — OFFICE VISIT (OUTPATIENT)
Dept: OPHTHALMOLOGY | Facility: CLINIC | Age: 83
End: 2018-11-20
Payer: MEDICARE

## 2018-11-20 DIAGNOSIS — H40.1132 PRIMARY OPEN ANGLE GLAUCOMA OF BOTH EYES, MODERATE STAGE: ICD-10-CM

## 2018-11-20 PROCEDURE — G0463 HOSPITAL OUTPT CLINIC VISIT: HCPCS | Performed by: OPHTHALMOLOGY

## 2018-11-20 PROCEDURE — 92133 CPTRZD OPH DX IMG PST SGM ON: CPT | Performed by: OPHTHALMOLOGY

## 2018-11-20 PROCEDURE — 99213 OFFICE O/P EST LOW 20 MIN: CPT | Performed by: OPHTHALMOLOGY

## 2018-11-20 PROCEDURE — 92083 EXTENDED VISUAL FIELD XM: CPT | Performed by: OPHTHALMOLOGY

## 2018-11-20 RX ORDER — LATANOPROST 50 UG/ML
SOLUTION/ DROPS OPHTHALMIC
Qty: 3 BOTTLE | Refills: 3 | Status: SHIPPED | OUTPATIENT
Start: 2018-11-20 | End: 2019-08-20

## 2018-11-20 NOTE — PATIENT INSTRUCTIONS
Primary open angle glaucoma of both eyes, moderate stage  Discussed with patient Intraocular pressure stable, tolerating medications. Will continue same regimen of Latanoprost in both eyes at bedtime.   Visual field and OCT completed in office today with s

## 2018-11-20 NOTE — PROGRESS NOTES
Yaneth Stafford is a 82year old female. HPI:     HPI     Patient is here for a VF, OCT and IOP check. She is taking Latanoprost OU QHS.       Last edited by Haja Rm OT on 11/20/2018 10:38 AM. (History)        Patient History:  Past Medical His Types: 1 Glasses of wine per week      Comment: Rare glass of wine    Drug use: No      Medications:    Current Outpatient Medications:  latanoprost 0.005 % Ophthalmic Solution Use 1 drop in both eyes at bedtime Disp: 3 Bottle Rfl: 3   acetaminophen (TYLEN Progressive bifocal          Wearing Rx #2       Sphere Cylinder Axis Add    Right +0.50 +1.00 180 +2.75    Left +0.50 +1.00 010 +2.75    Type:  Progressive bifocal                 ASSESSMENT/PLAN:     Diagnoses and Plan:     Primary open angle glaucoma of

## 2018-11-20 NOTE — ASSESSMENT & PLAN NOTE
Discussed with patient Intraocular pressure stable, tolerating medications. Will continue same regimen of Latanoprost in both eyes at bedtime.   Visual field and OCT completed in office today with stable results that were discussed with patient in office t

## 2018-12-14 ENCOUNTER — PRIOR ORIGINAL RECORDS (OUTPATIENT)
Dept: OTHER | Age: 83
End: 2018-12-14

## 2018-12-14 ENCOUNTER — MYAURORA ACCOUNT LINK (OUTPATIENT)
Dept: OTHER | Age: 83
End: 2018-12-14

## 2019-02-28 VITALS
HEIGHT: 71 IN | HEART RATE: 66 BPM | DIASTOLIC BLOOD PRESSURE: 74 MMHG | SYSTOLIC BLOOD PRESSURE: 116 MMHG | WEIGHT: 194 LBS | OXYGEN SATURATION: 97 % | BODY MASS INDEX: 27.16 KG/M2

## 2019-02-28 VITALS
BODY MASS INDEX: 25.9 KG/M2 | WEIGHT: 185 LBS | DIASTOLIC BLOOD PRESSURE: 60 MMHG | OXYGEN SATURATION: 96 % | SYSTOLIC BLOOD PRESSURE: 96 MMHG | HEIGHT: 71 IN | HEART RATE: 68 BPM

## 2019-04-09 ENCOUNTER — OFFICE VISIT (OUTPATIENT)
Dept: OPHTHALMOLOGY | Facility: CLINIC | Age: 84
End: 2019-04-09
Payer: MEDICARE

## 2019-04-09 ENCOUNTER — TELEPHONE (OUTPATIENT)
Dept: OPHTHALMOLOGY | Facility: CLINIC | Age: 84
End: 2019-04-09

## 2019-04-09 DIAGNOSIS — H40.1132 PRIMARY OPEN ANGLE GLAUCOMA OF BOTH EYES, MODERATE STAGE: Primary | ICD-10-CM

## 2019-04-09 DIAGNOSIS — Z53.21 PATIENT LEFT WITHOUT BEING SEEN: ICD-10-CM

## 2019-04-09 PROCEDURE — 92250 FUNDUS PHOTOGRAPHY W/I&R: CPT | Performed by: OPHTHALMOLOGY

## 2019-04-09 NOTE — ASSESSMENT & PLAN NOTE
Patient left without being seen.   Photos were taken today, so patient will be charged for photos only, but not for the exam.

## 2019-04-09 NOTE — PROGRESS NOTES
Ashley Mccollum is a 82year old female. HPI:     HPI     Patient is here for a complete exam and photos. She is taking Latanoprost OU QHS as directed. Patient states her vision is good. She has no ocular complaints.       Last edited by Rebekah Marquez Smokeless tobacco: Never Used    Alcohol use:  Yes      Alcohol/week: 0.5 oz      Types: 1 Glasses of wine per week      Comment: Rare glass of wine    Drug use: No      Medications:    Current Outpatient Medications:  latanoprost 0.005 % Ophthalmic Solutio Patient is happy with her glasses                  ASSESSMENT/PLAN:     Diagnoses and Plan:     Primary open angle glaucoma of both eyes, moderate stage  Patient left without being seen today.   She had a 10:45 appointment today and Dr. Oneil Panda was read

## 2019-04-09 NOTE — PATIENT INSTRUCTIONS
Primary open angle glaucoma of both eyes, moderate stage  Patient left without being seen today. She had a 10:45 appointment today and Dr. Dhara Pinon was ready to see her at 11:40.   She informed the technician at 11:35 that she had to get out due to an appo

## 2019-04-09 NOTE — TELEPHONE ENCOUNTER
Patient left without being seen and on her way out, she rescheduled her appointment for a dilated eye exam to 10/10/19. Dr. Annie Moya says that is too long of a time to wait and asked me to give her a call. Patient left without being seen today.   She had

## 2019-04-09 NOTE — ASSESSMENT & PLAN NOTE
Patient left without being seen today. She had a 10:45 appointment today and Dr. Annie Moya was ready to see her at 11:40. She informed the technician at 11:35 that she had to get out due to an appointment that her  has scheduled today.     Photos we

## 2019-04-15 RX ORDER — METOPROLOL TARTRATE 50 MG/1
TABLET, FILM COATED ORAL
COMMUNITY
Start: 2019-02-08 | End: 2019-08-13 | Stop reason: SDUPTHER

## 2019-04-15 RX ORDER — DRONEDARONE 400 MG/1
TABLET, FILM COATED ORAL
Qty: 180 TABLET | Refills: 1 | Status: SHIPPED | OUTPATIENT
Start: 2019-04-15 | End: 2019-08-30 | Stop reason: SDUPTHER

## 2019-04-15 NOTE — TELEPHONE ENCOUNTER
Since patient has been left multiple messages and we have not heard back, will close encounter.   Mino Sinha

## 2019-06-21 NOTE — ASSESSMENT & PLAN NOTE
Discussed with patient Intraocular pressure stable, tolerating medications. Will continue same regimen of Latanoprost in both eyes at bedtime.   Will have patient back for next available visual field and OCT with no MD and then 4 months IOP check when she This is a recent snapshot of the patient's [facility] medical record.  For current drug dose and complete information and questions, call [phone] or In Basket pool, [facility] home infusion ([id number])  CSN Number:  [id number]

## 2019-08-13 RX ORDER — METOPROLOL TARTRATE 50 MG/1
TABLET, FILM COATED ORAL
Qty: 180 TABLET | Refills: 0 | Status: SHIPPED | OUTPATIENT
Start: 2019-08-13 | End: 2019-08-30 | Stop reason: SDUPTHER

## 2019-08-20 ENCOUNTER — OFFICE VISIT (OUTPATIENT)
Dept: OPHTHALMOLOGY | Facility: CLINIC | Age: 84
End: 2019-08-20
Payer: MEDICARE

## 2019-08-20 DIAGNOSIS — H43.393 VITREOUS FLOATERS OF BOTH EYES: ICD-10-CM

## 2019-08-20 DIAGNOSIS — Z96.1 PSEUDOPHAKIA OF BOTH EYES: ICD-10-CM

## 2019-08-20 DIAGNOSIS — H26.493 AFTER CATARACT NOT OBSCURING VISION, BILATERAL: ICD-10-CM

## 2019-08-20 DIAGNOSIS — H40.1132 PRIMARY OPEN ANGLE GLAUCOMA OF BOTH EYES, MODERATE STAGE: Primary | ICD-10-CM

## 2019-08-20 PROBLEM — Z53.21 PATIENT LEFT WITHOUT BEING SEEN: Status: RESOLVED | Noted: 2019-04-09 | Resolved: 2019-08-20

## 2019-08-20 PROCEDURE — 92014 COMPRE OPH EXAM EST PT 1/>: CPT | Performed by: OPHTHALMOLOGY

## 2019-08-20 RX ORDER — LATANOPROST 50 UG/ML
SOLUTION/ DROPS OPHTHALMIC
Qty: 3 BOTTLE | Refills: 3 | Status: SHIPPED | OUTPATIENT
Start: 2019-08-20 | End: 2021-02-03

## 2019-08-20 NOTE — PROGRESS NOTES
Rosie Mccloud is a 82year old female. HPI:     HPI     Patient is here for a complete exam.  She is taking Latanoprost OU QHS as directed. Patient states her vision is good. She has no ocular complaints.       Last edited by Leonardo Balderas OT on 8 tobacco: Never Used    Alcohol use:  Yes      Alcohol/week: 0.8 standard drinks      Types: 1 Glasses of wine per week      Comment: Rare glass of wine    Drug use: No      Medications:    Current Outpatient Medications:  latanoprost 0.005 % Ophthalmic Solu Vitreous floaters          Fundus Exam       Right Left    Disc Sloping margin, Temporal crescent Sloping margin, Temporal crescent    C/D Ratio 0.9 0.8    Macula Normal Normal    Vessels Normal Normal    Periphery Normal Normal            Refraction     W

## 2019-08-29 PROBLEM — I50.22 CHRONIC SYSTOLIC CHF (CONGESTIVE HEART FAILURE) (CMD): Status: ACTIVE | Noted: 2019-08-29

## 2019-08-29 PROBLEM — I34.0 MITRAL REGURGITATION: Status: ACTIVE | Noted: 2019-08-29

## 2019-08-29 PROBLEM — I48.0 PAROXYSMAL A-FIB (CMD): Status: ACTIVE | Noted: 2019-08-29

## 2019-08-29 PROBLEM — M85.80 OSTEOPENIA: Status: ACTIVE | Noted: 2019-08-29

## 2019-08-29 PROBLEM — I44.7 LEFT BUNDLE BRANCH BLOCK: Status: ACTIVE | Noted: 2019-08-29

## 2019-08-29 PROBLEM — I70.0 AORTIC ATHEROSCLEROSIS (CMD): Status: ACTIVE | Noted: 2019-08-29

## 2019-08-29 PROBLEM — R00.2 PALPITATIONS: Status: ACTIVE | Noted: 2019-08-29

## 2019-08-29 PROBLEM — I51.9 LV DYSFUNCTION: Status: ACTIVE | Noted: 2019-08-29

## 2019-08-29 PROBLEM — L57.0 KERATOSIS, ACTINIC: Status: ACTIVE | Noted: 2019-08-29

## 2019-08-29 RX ORDER — LATANOPROST 50 UG/ML
SOLUTION/ DROPS OPHTHALMIC
COMMUNITY
Start: 2019-08-20

## 2019-08-29 RX ORDER — MULTIVITAMIN
1 CAPSULE ORAL DAILY
COMMUNITY
Start: 2012-08-27

## 2019-08-29 RX ORDER — ACETAMINOPHEN 500 MG
500 TABLET ORAL PRN
COMMUNITY

## 2019-08-30 ENCOUNTER — OFFICE VISIT (OUTPATIENT)
Dept: CARDIOLOGY | Age: 84
End: 2019-08-30

## 2019-08-30 VITALS
OXYGEN SATURATION: 97 % | DIASTOLIC BLOOD PRESSURE: 54 MMHG | SYSTOLIC BLOOD PRESSURE: 116 MMHG | BODY MASS INDEX: 27.44 KG/M2 | HEIGHT: 71 IN | HEART RATE: 57 BPM | WEIGHT: 196 LBS

## 2019-08-30 DIAGNOSIS — I48.0 PAROXYSMAL A-FIB (CMD): Primary | ICD-10-CM

## 2019-08-30 DIAGNOSIS — I44.7 LEFT BUNDLE BRANCH BLOCK: ICD-10-CM

## 2019-08-30 PROCEDURE — 99214 OFFICE O/P EST MOD 30 MIN: CPT | Performed by: INTERNAL MEDICINE

## 2019-08-30 PROCEDURE — 93000 ELECTROCARDIOGRAM COMPLETE: CPT | Performed by: INTERNAL MEDICINE

## 2019-08-30 RX ORDER — METOPROLOL TARTRATE 50 MG/1
50 TABLET, FILM COATED ORAL 2 TIMES DAILY
Qty: 180 TABLET | Refills: 3 | Status: SHIPPED | OUTPATIENT
Start: 2019-08-30 | End: 2020-09-25 | Stop reason: SDUPTHER

## 2019-08-30 SDOH — HEALTH STABILITY: MENTAL HEALTH: HOW OFTEN DO YOU HAVE A DRINK CONTAINING ALCOHOL?: MONTHLY OR LESS

## 2019-09-04 DIAGNOSIS — I48.0 PAROXYSMAL A-FIB (CMD): ICD-10-CM

## 2019-09-05 ENCOUNTER — TELEPHONE (OUTPATIENT)
Dept: CARDIOLOGY | Age: 84
End: 2019-09-05

## 2019-10-15 ENCOUNTER — OFFICE VISIT (OUTPATIENT)
Dept: INTERNAL MEDICINE CLINIC | Facility: CLINIC | Age: 84
End: 2019-10-15
Payer: MEDICARE

## 2019-10-15 VITALS
WEIGHT: 193.5 LBS | SYSTOLIC BLOOD PRESSURE: 112 MMHG | HEIGHT: 71 IN | HEART RATE: 67 BPM | BODY MASS INDEX: 27.09 KG/M2 | DIASTOLIC BLOOD PRESSURE: 68 MMHG

## 2019-10-15 DIAGNOSIS — Z00.00 ENCOUNTER FOR ANNUAL HEALTH EXAMINATION: ICD-10-CM

## 2019-10-15 DIAGNOSIS — I70.0 AORTIC ATHEROSCLEROSIS (HCC): ICD-10-CM

## 2019-10-15 DIAGNOSIS — I34.0 MITRAL VALVE INSUFFICIENCY, UNSPECIFIED ETIOLOGY: ICD-10-CM

## 2019-10-15 DIAGNOSIS — I50.22 CHRONIC SYSTOLIC HEART FAILURE (HCC): Chronic | ICD-10-CM

## 2019-10-15 DIAGNOSIS — N18.30 CHRONIC KIDNEY DISEASE, STAGE 3 (HCC): ICD-10-CM

## 2019-10-15 DIAGNOSIS — I27.20 PULMONARY HYPERTENSION (HCC): ICD-10-CM

## 2019-10-15 DIAGNOSIS — I48.0 PAROXYSMAL ATRIAL FIBRILLATION (HCC): ICD-10-CM

## 2019-10-15 DIAGNOSIS — Z00.00 ANNUAL PHYSICAL EXAM: Primary | ICD-10-CM

## 2019-10-15 DIAGNOSIS — I51.9 LV DYSFUNCTION: ICD-10-CM

## 2019-10-15 PROBLEM — I50.21 ACUTE SYSTOLIC CHF (CONGESTIVE HEART FAILURE) (HCC): Status: RESOLVED | Noted: 2017-12-01 | Resolved: 2019-10-15

## 2019-10-15 PROBLEM — H26.493 AFTER CATARACT NOT OBSCURING VISION, BILATERAL: Status: RESOLVED | Noted: 2019-08-20 | Resolved: 2019-10-15

## 2019-10-15 PROBLEM — R00.2 PALPITATIONS: Chronic | Status: RESOLVED | Noted: 2018-01-11 | Resolved: 2019-10-15

## 2019-10-15 PROBLEM — I48.91 ATRIAL FIBRILLATION WITH RVR (HCC): Status: RESOLVED | Noted: 2018-01-22 | Resolved: 2019-10-15

## 2019-10-15 PROCEDURE — G0463 HOSPITAL OUTPT CLINIC VISIT: HCPCS | Performed by: INTERNAL MEDICINE

## 2019-10-15 PROCEDURE — 90662 IIV NO PRSV INCREASED AG IM: CPT | Performed by: INTERNAL MEDICINE

## 2019-10-15 PROCEDURE — G0439 PPPS, SUBSEQ VISIT: HCPCS | Performed by: INTERNAL MEDICINE

## 2019-10-15 PROCEDURE — G0008 ADMIN INFLUENZA VIRUS VAC: HCPCS | Performed by: INTERNAL MEDICINE

## 2019-10-15 PROCEDURE — 99213 OFFICE O/P EST LOW 20 MIN: CPT | Performed by: INTERNAL MEDICINE

## 2019-10-15 NOTE — PATIENT INSTRUCTIONS
You received a flu shot today. Please obtain blood test soon when you can. Continue current medications. Continue follow-up with Cardiology.   Annual Medicare physical.  Mateo Hunter's SCREENING SCHEDULE   Tests on this list are recommended by your p to patients who meet one of the following criteria:   • Men who are 73-68 years old and have smoked more than 100 cigarettes in their lifetime   • Anyone with a family history    Colorectal Cancer Screening  Covered up to Age 76     Colonoscopy Screen   Co Please get this Mammogram regularly   Immunizations      Influenza  Covered Annually Orders placed or performed in visit on 10/15/19   • FLU VACC HIGH DOSE PRSV FREE    Please get every year    Pneumococcal 13 (Prevnar)  Covered Once after 65 Orders placed [facility] regarding Advance Directives.

## 2019-10-15 NOTE — PROGRESS NOTES
HPI:   Aixa Moscoso is an 82year old female who presents this morning for a Medicare Subsequent Annual Wellness visit (Pt already had Initial Annual Wellness). Her last Medicare physical was October 2016.   She states that she feels fine, and has no see flowsheet entries    Functional Ability/Status   Juan Huynh has a completely normal functional assessment! Please see flow sheet section for details.       Depression Screening (PHQ-2/PHQ-9): Over the LAST 2 WEEKS   Little interest or pleasure in 3.03 12/31/2017    CREATSERUM 1.24 01/30/2018     (H) 01/30/2018        CBC  (most recent labs)   Lab Results   Component Value Date    WBC 7.7 01/23/2018    HGB 13.1 01/23/2018     01/23/2018        ALLERGIES:   She is allergic to sulfa anti never smoked. She has never used smokeless tobacco. She reports current alcohol use of about 0.8 standard drinks of alcohol per week. She reports that she does not use drugs.      REVIEW OF SYSTEMS:     REVIEW OF SYSTEMS:   GENERAL: No fever  LUNGS: Occasio abdullahi (or don't speak clearly):  Sometimes People get annoyed because I misunderstand what they say:  No   I misunderstand what others are saying and make inappropriate responses:  No I avoid social activities because I cannot hear well and fear I will re ([id number]) 10/15/2019        ASSESSMENT AND OTHER RELEVANT CHRONIC CONDITIONS:   Cynthia Narayan is a 82year old female who presents for a Medicare Assessment.      PLAN SUMMARY:   Diagnoses and all orders for this visit:    Annual physical exam  High-dose i chart, separate sheet to patient  [address] Internal Lab or Procedure External Lab or Procedure   Diabetes Screening      HbgA1C   Annually No results found for: A1C No flowsheet data found.     Fasting Blood Sugar (FSB Please get once after your 65th birthday    Pneumococcal 23 (Pneumovax)  Covered Once after 65 07/26/2007 Please get once after your 65th birthday    Hepatitis B for Moderate/High Risk No vaccine history found Medium/high risk factors:   End-stage renal di

## 2019-12-03 ENCOUNTER — OFFICE VISIT (OUTPATIENT)
Dept: CARDIOLOGY | Age: 84
End: 2019-12-03

## 2019-12-03 DIAGNOSIS — I48.0 PAROXYSMAL A-FIB (CMD): Primary | ICD-10-CM

## 2019-12-03 PROCEDURE — 93000 ELECTROCARDIOGRAM COMPLETE: CPT | Performed by: INTERNAL MEDICINE

## 2019-12-12 ENCOUNTER — OFFICE VISIT (OUTPATIENT)
Dept: OPHTHALMOLOGY | Facility: CLINIC | Age: 84
End: 2019-12-12
Payer: MEDICARE

## 2019-12-12 DIAGNOSIS — H40.1132 PRIMARY OPEN ANGLE GLAUCOMA OF BOTH EYES, MODERATE STAGE: ICD-10-CM

## 2019-12-12 PROCEDURE — 99213 OFFICE O/P EST LOW 20 MIN: CPT | Performed by: OPHTHALMOLOGY

## 2019-12-12 PROCEDURE — G0463 HOSPITAL OUTPT CLINIC VISIT: HCPCS | Performed by: OPHTHALMOLOGY

## 2019-12-12 PROCEDURE — 92083 EXTENDED VISUAL FIELD XM: CPT | Performed by: OPHTHALMOLOGY

## 2019-12-12 PROCEDURE — 92133 CPTRZD OPH DX IMG PST SGM ON: CPT | Performed by: OPHTHALMOLOGY

## 2019-12-12 NOTE — PATIENT INSTRUCTIONS
Primary open angle glaucoma of both eyes, moderate stage  Discussed with patient Intraocular pressure stable, tolerating medications. Will continue same regimen of Latanoprost in both eyes at bedtime.    Visual field and OCT completed in office today with

## 2019-12-12 NOTE — ASSESSMENT & PLAN NOTE
Discussed with patient Intraocular pressure stable, tolerating medications. Will continue same regimen of Latanoprost in both eyes at bedtime.    Visual field and OCT completed in office today with stable results that were discussed with patient in office

## 2019-12-12 NOTE — PROGRESS NOTES
Iban Chavira is a 82year old female. HPI:     HPI     Here for a VF, OCT and IOP check. Pt has been using Latanoprost OU qhs as directed. Pt states that her vision is stable and has no ocular complaints.      Last edited by Major Vicente O.T. on Paternal Aunt        Social History: Social History    Tobacco Use      Smoking status: Never Smoker      Smokeless tobacco: Never Used    Alcohol use:  Yes      Alcohol/week: 0.8 standard drinks      Types: 1 Glasses of wine per week      Comment: Rare gla crescent    C/D Ratio 0.9 0.8            Refraction     Wearing Rx       Sphere Cylinder Axis Add    Right +0.50 +1.00 180 +3.00    Left +0.50 +1.00 010 +3.00    Type:  Progressive bifocal                 ASSESSMENT/PLAN:     Diagnoses and Plan:     Primar

## 2020-01-13 ENCOUNTER — OFFICE VISIT (OUTPATIENT)
Dept: INTERNAL MEDICINE CLINIC | Facility: CLINIC | Age: 85
End: 2020-01-13
Payer: MEDICARE

## 2020-01-13 VITALS
DIASTOLIC BLOOD PRESSURE: 74 MMHG | HEART RATE: 76 BPM | WEIGHT: 197 LBS | RESPIRATION RATE: 16 BRPM | HEIGHT: 71 IN | BODY MASS INDEX: 27.58 KG/M2 | SYSTOLIC BLOOD PRESSURE: 125 MMHG

## 2020-01-13 DIAGNOSIS — J06.9 ACUTE URI: Primary | ICD-10-CM

## 2020-01-13 PROCEDURE — 99213 OFFICE O/P EST LOW 20 MIN: CPT | Performed by: INTERNAL MEDICINE

## 2020-01-13 PROCEDURE — G0463 HOSPITAL OUTPT CLINIC VISIT: HCPCS | Performed by: INTERNAL MEDICINE

## 2020-01-13 RX ORDER — AMOXICILLIN 875 MG/1
875 TABLET, COATED ORAL 2 TIMES DAILY
Qty: 14 TABLET | Refills: 0 | Status: SHIPPED | OUTPATIENT
Start: 2020-01-13 | End: 2020-01-20

## 2020-01-13 NOTE — PROGRESS NOTES
Amari Noland is a 82year old female. Patient presents with:  Nasal Congestion    HPI:   For the past 1 week she has had persistent symptoms of nasal congestion with initially clear and now green drainage, and a slight cough productive of green sputum. 1-18 with EF 40% and no obstructive CAD   • Mitral regurgitation     Echo 1-18 with moderate-severe MR and TR; JAI 1-18 with moderate-severe MR   • Osteopenia September 2011    T score -1.3 hip and -0.7 lumbar spine   • Paroxysmal atrial fibrillation ([address]

## 2020-01-14 ENCOUNTER — APPOINTMENT (OUTPATIENT)
Dept: LAB | Facility: HOSPITAL | Age: 85
End: 2020-01-14
Attending: INTERNAL MEDICINE
Payer: MEDICARE

## 2020-01-14 DIAGNOSIS — I70.0 AORTIC ATHEROSCLEROSIS (HCC): ICD-10-CM

## 2020-01-14 DIAGNOSIS — I50.22 CHRONIC SYSTOLIC HEART FAILURE (HCC): Chronic | ICD-10-CM

## 2020-01-14 LAB
ABSOLUTE IMMATURE GRANULOCYTES (OFFPRE24): NORMAL
ALBUMIN SERPL-MCNC: 3.2 G/DL
ALBUMIN SERPL-MCNC: 3.2 G/DL (ref 3.4–5)
ALBUMIN/GLOB SERPL: 0.9 {RATIO}
ALBUMIN/GLOB SERPL: 0.9 {RATIO} (ref 1–2)
ALP LIVER SERPL-CCNC: 92 U/L (ref 55–142)
ALP SERPL-CCNC: 92 U/L
ALT SERPL-CCNC: 18 U/L
ALT SERPL-CCNC: 18 U/L (ref 13–56)
ANION GAP SERPL CALC-SCNC: 4 MMOL/L
ANION GAP SERPL CALC-SCNC: 4 MMOL/L (ref 0–18)
AST SERPL-CCNC: 21 U/L
AST SERPL-CCNC: 21 U/L (ref 15–37)
BASO+EOS+MONOS # BLD: NORMAL 10*3/UL
BASO+EOS+MONOS NFR BLD: NORMAL %
BASOPHILS # BLD: NORMAL 10*3/UL
BASOPHILS NFR BLD: NORMAL %
BILIRUB SERPL-MCNC: 0.3 MG/DL
BILIRUB SERPL-MCNC: 0.3 MG/DL (ref 0.1–2)
BUN BLD-MCNC: 15 MG/DL (ref 7–18)
BUN SERPL-MCNC: 15 MG/DL
BUN/CREAT SERPL: 15.3
BUN/CREAT SERPL: 15.3 (ref 10–20)
CALCIUM BLD-MCNC: 9 MG/DL (ref 8.5–10.1)
CALCIUM SERPL-MCNC: 9 MG/DL
CHLORIDE SERPL-SCNC: 107 MMOL/L
CHLORIDE SERPL-SCNC: 107 MMOL/L (ref 98–112)
CHOLEST SERPL-MCNC: 196 MG/DL
CHOLEST SMN-MCNC: 196 MG/DL (ref ?–200)
CHOLEST/HDLC SERPL: 71 {RATIO}
CO2 SERPL-SCNC: 31 MMOL/L
CO2 SERPL-SCNC: 31 MMOL/L (ref 21–32)
CREAT BLD-MCNC: 0.98 MG/DL (ref 0.55–1.02)
CREAT SERPL-MCNC: 0.98 MG/DL
DEPRECATED RDW RBC AUTO: 46 FL (ref 35.1–46.3)
DIFFERENTIAL METHOD BLD: NORMAL
EOSINOPHIL # BLD: NORMAL 10*3/UL
EOSINOPHIL NFR BLD: NORMAL %
ERYTHROCYTE [DISTWIDTH] IN BLOOD BY AUTOMATED COUNT: 13.2 % (ref 11–15)
ERYTHROCYTE [DISTWIDTH] IN BLOOD: NORMAL %
GLOBULIN PLAS-MCNC: 3.5 G/DL (ref 2.8–4.4)
GLOBULIN SER-MCNC: 3.5 G/DL
GLUCOSE BLD-MCNC: 99 MG/DL (ref 70–99)
GLUCOSE SERPL-MCNC: 99 MG/DL
HCT VFR BLD AUTO: 44 % (ref 35–48)
HCT VFR BLD CALC: 44 %
HDLC SERPL-MCNC: 71 MG/DL (ref 40–59)
HDLC SERPL-MCNC: NORMAL MG/DL
HGB BLD-MCNC: 13.7 G/DL
HGB BLD-MCNC: 13.7 G/DL (ref 12–16)
IMMATURE GRANULOCYTES (OFFPRE25): NORMAL
LDLC SERPL CALC-MCNC: 91 MG/DL
LDLC SERPL CALC-MCNC: 91 MG/DL (ref ?–100)
LENGTH OF FAST TIME PATIENT: NORMAL H
LENGTH OF FAST TIME PATIENT: NORMAL H
LYMPHOCYTES # BLD: NORMAL 10*3/UL
LYMPHOCYTES NFR BLD: NORMAL %
M PROTEIN MFR SERPL ELPH: 6.7 G/DL (ref 6.4–8.2)
MCH RBC QN AUTO: 29.7 PG (ref 26–34)
MCH RBC QN AUTO: NORMAL PG
MCHC RBC AUTO-ENTMCNC: 31.1 G/DL (ref 31–37)
MCHC RBC AUTO-ENTMCNC: NORMAL G/DL
MCV RBC AUTO: 95.2 FL (ref 80–100)
MCV RBC AUTO: NORMAL FL
MONOCYTES # BLD: NORMAL 10*3/UL
MONOCYTES NFR BLD: NORMAL %
MPV (OFFPRE2): NORMAL
NEUTROPHILS # BLD: NORMAL 10*3/UL
NEUTROPHILS NFR BLD: NORMAL %
NONHDLC SERPL-MCNC: 125 MG/DL
NONHDLC SERPL-MCNC: 125 MG/DL (ref ?–130)
NRBC BLD MANUAL-RTO: NORMAL %
OSMOLALITY SERPL CALC.SUM OF ELEC: 295 MOSM/KG (ref 275–295)
PATIENT FASTING Y/N/NP: YES
PATIENT FASTING Y/N/NP: YES
PLAT MORPH BLD: NORMAL
PLATELET # BLD AUTO: 204 10(3)UL (ref 150–450)
PLATELET # BLD: NORMAL 10*3/UL
POTASSIUM SERPL-SCNC: 4.2 MMOL/L
POTASSIUM SERPL-SCNC: 4.2 MMOL/L (ref 3.5–5.1)
PROT SERPL-MCNC: 6.7 G/DL
RBC # BLD AUTO: 4.62 X10(6)UL (ref 3.8–5.3)
RBC # BLD: 4.62 10*6/UL
RBC MORPH BLD: NORMAL
SODIUM SERPL-SCNC: 142 MMOL/L
SODIUM SERPL-SCNC: 142 MMOL/L (ref 136–145)
TRIGL SERPL-MCNC: 168 MG/DL
TRIGL SERPL-MCNC: 168 MG/DL (ref 30–149)
VLDLC SERPL CALC-MCNC: 34 MG/DL
VLDLC SERPL CALC-MCNC: 34 MG/DL (ref 0–30)
WBC # BLD AUTO: 6.4 X10(3) UL (ref 4–11)
WBC # BLD: 6.4 10*3/UL
WBC MORPH BLD: NORMAL

## 2020-01-14 PROCEDURE — 85027 COMPLETE CBC AUTOMATED: CPT

## 2020-01-14 PROCEDURE — 80053 COMPREHEN METABOLIC PANEL: CPT

## 2020-01-14 PROCEDURE — 80061 LIPID PANEL: CPT

## 2020-01-14 PROCEDURE — 36415 COLL VENOUS BLD VENIPUNCTURE: CPT

## 2020-03-30 ENCOUNTER — TELEPHONE (OUTPATIENT)
Dept: CARDIOLOGY | Age: 85
End: 2020-03-30

## 2020-04-06 ENCOUNTER — TELEPHONE (OUTPATIENT)
Dept: CARDIOLOGY | Age: 85
End: 2020-04-06

## 2020-04-06 ENCOUNTER — OFFICE VISIT (OUTPATIENT)
Dept: CARDIOLOGY | Age: 85
End: 2020-04-06

## 2020-04-06 VITALS — WEIGHT: 197 LBS | HEIGHT: 71 IN | BODY MASS INDEX: 27.58 KG/M2

## 2020-04-06 DIAGNOSIS — I48.0 PAROXYSMAL A-FIB (CMD): Primary | ICD-10-CM

## 2020-04-06 PROCEDURE — 99442 TELEPHONE E&M BY PHYSICIAN EST PT NOT ORIG PREV 7 DAYS 11-20 MIN: CPT | Performed by: INTERNAL MEDICINE

## 2020-04-06 SDOH — HEALTH STABILITY: MENTAL HEALTH: HOW OFTEN DO YOU HAVE A DRINK CONTAINING ALCOHOL?: NOT ASKED

## 2020-04-08 ENCOUNTER — APPOINTMENT (OUTPATIENT)
Dept: CARDIOLOGY | Age: 85
End: 2020-04-08

## 2020-06-16 ENCOUNTER — OFFICE VISIT (OUTPATIENT)
Dept: CARDIOLOGY | Age: 85
End: 2020-06-16

## 2020-06-16 ENCOUNTER — ANCILLARY PROCEDURE (OUTPATIENT)
Dept: CARDIOLOGY | Age: 85
End: 2020-06-16

## 2020-06-16 ENCOUNTER — TELEPHONE (OUTPATIENT)
Dept: CARDIOLOGY | Age: 85
End: 2020-06-16

## 2020-06-16 DIAGNOSIS — I48.0 PAROXYSMAL A-FIB (CMD): Primary | ICD-10-CM

## 2020-06-16 DIAGNOSIS — I50.22 CHRONIC SYSTOLIC CHF (CONGESTIVE HEART FAILURE) (CMD): ICD-10-CM

## 2020-06-16 DIAGNOSIS — I48.91 ATRIAL FIBRILLATION, UNSPECIFIED TYPE (CMD): ICD-10-CM

## 2020-06-16 PROCEDURE — 93000 ELECTROCARDIOGRAM COMPLETE: CPT | Performed by: INTERNAL MEDICINE

## 2020-06-17 DIAGNOSIS — I48.0 PAROXYSMAL A-FIB (CMD): ICD-10-CM

## 2020-06-18 PROBLEM — R60.0 LOCALIZED EDEMA: Status: ACTIVE | Noted: 2020-06-18

## 2020-06-19 ENCOUNTER — OFFICE VISIT (OUTPATIENT)
Dept: CARDIOLOGY | Age: 85
End: 2020-06-19

## 2020-06-19 VITALS
SYSTOLIC BLOOD PRESSURE: 118 MMHG | WEIGHT: 200 LBS | HEIGHT: 71 IN | DIASTOLIC BLOOD PRESSURE: 68 MMHG | HEART RATE: 66 BPM | OXYGEN SATURATION: 94 % | BODY MASS INDEX: 28 KG/M2

## 2020-06-19 DIAGNOSIS — I34.0 NONRHEUMATIC MITRAL VALVE REGURGITATION: ICD-10-CM

## 2020-06-19 DIAGNOSIS — R60.0 LOCALIZED EDEMA: ICD-10-CM

## 2020-06-19 DIAGNOSIS — I48.0 PAROXYSMAL A-FIB (CMD): Primary | ICD-10-CM

## 2020-06-19 PROCEDURE — 99214 OFFICE O/P EST MOD 30 MIN: CPT | Performed by: NURSE PRACTITIONER

## 2020-06-19 SDOH — HEALTH STABILITY: MENTAL HEALTH: HOW OFTEN DO YOU HAVE A DRINK CONTAINING ALCOHOL?: NOT ASKED

## 2020-06-19 ASSESSMENT — PATIENT HEALTH QUESTIONNAIRE - PHQ9
CLINICAL INTERPRETATION OF PHQ9 SCORE: NO FURTHER SCREENING NEEDED
1. LITTLE INTEREST OR PLEASURE IN DOING THINGS: NOT AT ALL
2. FEELING DOWN, DEPRESSED OR HOPELESS: NOT AT ALL
SUM OF ALL RESPONSES TO PHQ9 QUESTIONS 1 AND 2: 0
CLINICAL INTERPRETATION OF PHQ2 SCORE: NO FURTHER SCREENING NEEDED
SUM OF ALL RESPONSES TO PHQ9 QUESTIONS 1 AND 2: 0

## 2020-07-23 ENCOUNTER — ANCILLARY PROCEDURE (OUTPATIENT)
Dept: CARDIOLOGY | Age: 85
End: 2020-07-23
Attending: NURSE PRACTITIONER

## 2020-07-23 DIAGNOSIS — I34.0 NONRHEUMATIC MITRAL VALVE REGURGITATION: ICD-10-CM

## 2020-07-23 DIAGNOSIS — R60.0 LOCALIZED EDEMA: ICD-10-CM

## 2020-07-23 PROCEDURE — 93306 TTE W/DOPPLER COMPLETE: CPT | Performed by: INTERNAL MEDICINE

## 2020-09-10 ENCOUNTER — TELEPHONE (OUTPATIENT)
Dept: CARDIOLOGY | Age: 85
End: 2020-09-10

## 2020-09-25 ENCOUNTER — OFFICE VISIT (OUTPATIENT)
Dept: CARDIOLOGY | Age: 85
End: 2020-09-25

## 2020-09-25 ENCOUNTER — MED REC SCAN ONLY (OUTPATIENT)
Dept: INTERNAL MEDICINE CLINIC | Facility: CLINIC | Age: 85
End: 2020-09-25

## 2020-09-25 VITALS
HEIGHT: 71 IN | HEART RATE: 59 BPM | WEIGHT: 196 LBS | OXYGEN SATURATION: 96 % | SYSTOLIC BLOOD PRESSURE: 120 MMHG | BODY MASS INDEX: 27.44 KG/M2 | DIASTOLIC BLOOD PRESSURE: 80 MMHG

## 2020-09-25 DIAGNOSIS — I44.7 LEFT BUNDLE BRANCH BLOCK: ICD-10-CM

## 2020-09-25 DIAGNOSIS — I34.0 NONRHEUMATIC MITRAL VALVE REGURGITATION: ICD-10-CM

## 2020-09-25 DIAGNOSIS — I48.0 PAROXYSMAL A-FIB (CMD): Primary | ICD-10-CM

## 2020-09-25 PROCEDURE — 99214 OFFICE O/P EST MOD 30 MIN: CPT | Performed by: INTERNAL MEDICINE

## 2020-09-25 RX ORDER — FUROSEMIDE 20 MG/1
20 TABLET ORAL DAILY
Qty: 30 TABLET | Refills: 3 | Status: SHIPPED | OUTPATIENT
Start: 2020-09-25

## 2020-09-25 RX ORDER — METOPROLOL TARTRATE 50 MG/1
50 TABLET, FILM COATED ORAL 2 TIMES DAILY
Qty: 180 TABLET | Refills: 3 | Status: SHIPPED | OUTPATIENT
Start: 2020-09-25

## 2020-09-25 SDOH — HEALTH STABILITY: MENTAL HEALTH: HOW OFTEN DO YOU HAVE A DRINK CONTAINING ALCOHOL?: NOT ASKED

## 2020-09-25 ASSESSMENT — PATIENT HEALTH QUESTIONNAIRE - PHQ9
2. FEELING DOWN, DEPRESSED OR HOPELESS: NOT AT ALL
CLINICAL INTERPRETATION OF PHQ2 SCORE: NO FURTHER SCREENING NEEDED
SUM OF ALL RESPONSES TO PHQ9 QUESTIONS 1 AND 2: 0
CLINICAL INTERPRETATION OF PHQ9 SCORE: NO FURTHER SCREENING NEEDED
1. LITTLE INTEREST OR PLEASURE IN DOING THINGS: NOT AT ALL
SUM OF ALL RESPONSES TO PHQ9 QUESTIONS 1 AND 2: 0

## 2020-10-15 ENCOUNTER — TELEPHONE (OUTPATIENT)
Dept: CARDIOLOGY | Age: 85
End: 2020-10-15

## 2020-11-20 ENCOUNTER — TELEPHONE (OUTPATIENT)
Dept: INTERNAL MEDICINE CLINIC | Facility: CLINIC | Age: 85
End: 2020-11-20

## 2020-12-08 ENCOUNTER — OFFICE VISIT (OUTPATIENT)
Dept: CARDIOLOGY | Age: 85
End: 2020-12-08

## 2020-12-08 DIAGNOSIS — I48.91 ATRIAL FIBRILLATION, UNSPECIFIED TYPE (CMD): ICD-10-CM

## 2020-12-08 DIAGNOSIS — I48.91 ATRIAL FIBRILLATION, UNSPECIFIED TYPE (CMD): Primary | ICD-10-CM

## 2020-12-08 PROBLEM — I48.0 PAROXYSMAL A-FIB (CMD): Chronic | Status: ACTIVE | Noted: 2019-08-29

## 2020-12-08 PROCEDURE — 93000 ELECTROCARDIOGRAM COMPLETE: CPT | Performed by: INTERNAL MEDICINE

## 2020-12-08 PROCEDURE — X1094 NO CHARGE VISIT: HCPCS | Performed by: INTERNAL MEDICINE

## 2021-01-21 NOTE — PATIENT INSTRUCTIONS
Observe closely and take Tylenol when needed for pain relief.   Schedule an appointment with Neurology if symptoms persist. none indicated

## 2021-01-26 ENCOUNTER — TELEPHONE (OUTPATIENT)
Dept: INTERNAL MEDICINE CLINIC | Facility: CLINIC | Age: 86
End: 2021-01-26

## 2021-02-03 RX ORDER — LATANOPROST 50 UG/ML
SOLUTION/ DROPS OPHTHALMIC
Qty: 3 BOTTLE | Refills: 3 | Status: SHIPPED | OUTPATIENT
Start: 2021-02-03 | End: 2021-12-28

## 2021-02-03 NOTE — TELEPHONE ENCOUNTER
LDE: 8/20/19   Last visit: 12/12/19   Due for: EE  Upcoming visit: 4/27/2021    Routed to [facility]

## 2021-02-11 ENCOUNTER — IMMUNIZATION (OUTPATIENT)
Dept: LAB | Age: 86
End: 2021-02-11

## 2021-02-11 DIAGNOSIS — Z23 NEED FOR VACCINATION: Primary | ICD-10-CM

## 2021-02-11 PROCEDURE — 0011A COVID-19 MODERNA VACCINE: CPT

## 2021-02-11 PROCEDURE — 91301 COVID-19 MODERNA VACCINE: CPT

## 2021-03-11 ENCOUNTER — IMMUNIZATION (OUTPATIENT)
Dept: LAB | Age: 86
End: 2021-03-11
Attending: HOSPITALIST

## 2021-03-11 DIAGNOSIS — Z23 NEED FOR VACCINATION: Primary | ICD-10-CM

## 2021-03-11 PROCEDURE — 91301 COVID-19 MODERNA VACCINE: CPT | Performed by: HOSPITALIST

## 2021-03-11 PROCEDURE — 0012A COVID-19 MODERNA VACCINE: CPT | Performed by: HOSPITALIST

## 2021-04-14 ENCOUNTER — OFFICE VISIT (OUTPATIENT)
Dept: CARDIOLOGY | Age: 86
End: 2021-04-14

## 2021-04-14 DIAGNOSIS — I48.91 ATRIAL FIBRILLATION, UNSPECIFIED TYPE (CMD): Primary | ICD-10-CM

## 2021-04-14 PROCEDURE — X1094 NO CHARGE VISIT: HCPCS | Performed by: INTERNAL MEDICINE

## 2021-04-26 ENCOUNTER — OFFICE VISIT (OUTPATIENT)
Dept: DERMATOLOGY CLINIC | Facility: CLINIC | Age: 86
End: 2021-04-26
Payer: MEDICARE

## 2021-04-26 DIAGNOSIS — D48.5 NEOPLASM OF UNCERTAIN BEHAVIOR OF SKIN: Primary | ICD-10-CM

## 2021-04-26 DIAGNOSIS — L72.0 EPIDERMAL CYST: ICD-10-CM

## 2021-04-26 DIAGNOSIS — L82.1 SEBORRHEIC KERATOSES: ICD-10-CM

## 2021-04-26 DIAGNOSIS — L57.8 SUN-DAMAGED SKIN: ICD-10-CM

## 2021-04-26 PROCEDURE — 99202 OFFICE O/P NEW SF 15 MIN: CPT | Performed by: DERMATOLOGY

## 2021-04-26 PROCEDURE — 11102 TANGNTL BX SKIN SINGLE LES: CPT | Performed by: DERMATOLOGY

## 2021-04-26 PROCEDURE — 88305 TISSUE EXAM BY PATHOLOGIST: CPT | Performed by: DERMATOLOGY

## 2021-04-26 NOTE — PROGRESS NOTES
HPI:     Chief Complaint     Lesion        HPI     Lesion      Additional comments: LOV 10/20/2016 Patient present with dark raised lesion on L side of neck . Patient c/o having lesion for 8 years and c/o changing size .  Patient has hx of AK               L Carb-Cholecalciferol (CALCIUM CARBONATE-VITAMIN D3 OR) Take 2 tablets by mouth daily. • Multiple Vitamin (MULTIVITAMINS) Oral Cap Take 1 capsule by mouth daily.        Allergies:     Sulfa Antibiotics       HIVES    Past Medical History:   Diagnosis Vega tobacco: Never Used    Vaping Use      Vaping Use: Never used    Substance and Sexual Activity      Alcohol use: Not Currently        Alcohol/week: 0.8 standard drinks        Types: 1 Glasses of wine per week        Comment: Rare glass of wine      Drug us Abused:       Sexually Abused:   Family History   Problem Relation Age of Onset   • Diabetes Father    • Other (Pancreatic Cancer) Father    • Other (Renal Cell Carcinoma) Brother    • Crohn's Disease Brother    • Glaucoma Mother    • Glaucoma Paternal Aun so that  it is absorbed and working and to reapply it every few hours.       Orders Placed This Encounter      TANGENTIAL BIOPSY SKIN SINGLE LESION      Specimen to Pathology, Tissue [[facility] Pt to [facility]]      Results From Past 48 Hours:  No results found

## 2021-04-27 ENCOUNTER — OFFICE VISIT (OUTPATIENT)
Dept: OPHTHALMOLOGY | Facility: CLINIC | Age: 86
End: 2021-04-27
Payer: MEDICARE

## 2021-04-27 DIAGNOSIS — Z96.1 PSEUDOPHAKIA OF BOTH EYES: ICD-10-CM

## 2021-04-27 DIAGNOSIS — H40.1132 PRIMARY OPEN ANGLE GLAUCOMA OF BOTH EYES, MODERATE STAGE: Primary | ICD-10-CM

## 2021-04-27 DIAGNOSIS — H26.493 AFTER-CATARACT OBSCURING VISION, BILATERAL: ICD-10-CM

## 2021-04-27 DIAGNOSIS — H43.393 VITREOUS FLOATERS OF BOTH EYES: ICD-10-CM

## 2021-04-27 PROCEDURE — 92014 COMPRE OPH EXAM EST PT 1/>: CPT | Performed by: OPHTHALMOLOGY

## 2021-04-27 PROCEDURE — 92250 FUNDUS PHOTOGRAPHY W/I&R: CPT | Performed by: OPHTHALMOLOGY

## 2021-04-27 NOTE — PATIENT INSTRUCTIONS
Primary open angle glaucoma of both eyes, moderate stage  Discussed with patient Intraocular pressure stable, tolerating medications. Will continue same regimen of Latanoprost in both eyes at bedtime.    Will have patient back a visual field and OCT with n

## 2021-04-27 NOTE — ASSESSMENT & PLAN NOTE
No treatment is needed at this time because patient is happy with her vision. She can call the office to schedule a right or left YAG laser in the future if she notices decreased vision in either eye.

## 2021-04-27 NOTE — ASSESSMENT & PLAN NOTE
Discussed with patient Intraocular pressure stable, tolerating medications. Will continue same regimen of Latanoprost in both eyes at bedtime. Will have patient back a visual field and OCT with no MD and then in 4 months for a pressure check.

## 2021-04-27 NOTE — PROGRESS NOTES
Nhi Leyva is a 82year old female. HPI:     HPI     Patient is here for a complete exam and photos. She is taking Latanoprost OU at bedtime as directed. Patient states her vision is good. She has no ocular complaints.   Patient was last seen by Onset   • Diabetes Father    • Other (Pancreatic Cancer) Father    • Other (Renal Cell Carcinoma) Brother    • Crohn's Disease Brother    • Glaucoma Mother    • Glaucoma Paternal Aunt    • Macular degeneration Paternal Aunt        Social History: Social Hi Oriented x3: Yes          Dilation     Both eyes: 1.0% Mydriacyl and 2.5% William Synephrine @ 2:17 PM            Slit Lamp and Fundus Exam     Slit Lamp Exam       Right Left    Lids/Lashes Dermatochalasis, Meibomian gland dysfunction Meibomian gland dysfu prescriptions requested or ordered in this encounter        Follow up instructions:  Return for Next avail. VF and OCT with no MD and then 4 months for a pressure check .     4/27/2021  Scribed by: Rosalynn Dakin, MD

## 2021-05-06 ENCOUNTER — NURSE ONLY (OUTPATIENT)
Dept: OPHTHALMOLOGY | Facility: CLINIC | Age: 86
End: 2021-05-06
Payer: MEDICARE

## 2021-05-06 DIAGNOSIS — H40.1132 PRIMARY OPEN ANGLE GLAUCOMA OF BOTH EYES, MODERATE STAGE: ICD-10-CM

## 2021-05-06 PROCEDURE — 92133 CPTRZD OPH DX IMG PST SGM ON: CPT | Performed by: OPHTHALMOLOGY

## 2021-05-06 PROCEDURE — 92083 EXTENDED VISUAL FIELD XM: CPT | Performed by: OPHTHALMOLOGY

## 2021-05-06 NOTE — PROGRESS NOTES
Andres Dutton is a 82year old female.     HPI:     HPI     Patient is here for a VF and OCT with no MD.      Last edited by Yvonne Poole OT on 5/6/2021  1:55 PM. (History)        Patient History:  Past Medical History:   Diagnosis Date   • Acute syst Smoking status: Never Smoker      Smokeless tobacco: Never Used    Vaping Use      Vaping Use: Never used    Alcohol use: Not Currently      Alcohol/week: 0.8 standard drinks      Types: 1 Glasses of wine per week      Comment: Rare glass of wine    [place]

## 2021-08-31 ENCOUNTER — OFFICE VISIT (OUTPATIENT)
Dept: OPHTHALMOLOGY | Facility: CLINIC | Age: 86
End: 2021-08-31
Payer: MEDICARE

## 2021-08-31 DIAGNOSIS — H40.1132 PRIMARY OPEN ANGLE GLAUCOMA OF BOTH EYES, MODERATE STAGE: Primary | ICD-10-CM

## 2021-08-31 PROCEDURE — 99213 OFFICE O/P EST LOW 20 MIN: CPT | Performed by: OPHTHALMOLOGY

## 2021-08-31 NOTE — PATIENT INSTRUCTIONS
Primary open angle glaucoma of both eyes, moderate stage  Discussed with patient Intraocular pressure stable, tolerating medications. Will continue same regimen of Latanoprost in both eyes at bedtime.    Will have patient back in 4 months for a pressure ch

## 2021-08-31 NOTE — PROGRESS NOTES
Rosie Mccloud is a 82year old female. HPI:     HPI     Pt is here for an IOP check. Pt is taking Latanoprost both eyes at bedtime as directed. Pt states vision is stable, she has no ocular complaints at this time.      Last edited by Reji Fernandes, OT Glaucoma Paternal Aunt    • Macular degeneration Paternal Aunt        Social History: Social History    Tobacco Use      Smoking status: Never Smoker      Smokeless tobacco: Never Used    Vaping Use      Vaping Use: Never used    Alcohol use: Not Currently PC IOL PC IOL           Fundus Exam       Right Left    Disc Sloping margin, Temporal crescent Sloping margin, Temporal crescent    C/D Ratio 0.9 0.8            Refraction     Wearing Rx       Sphere Cylinder Axis Add    Right +0.50 +1.00 180 +3.00    Left

## 2021-10-26 ENCOUNTER — OFFICE VISIT (OUTPATIENT)
Dept: INTERNAL MEDICINE CLINIC | Facility: CLINIC | Age: 86
End: 2021-10-26
Payer: MEDICARE

## 2021-10-26 VITALS
HEIGHT: 71 IN | DIASTOLIC BLOOD PRESSURE: 76 MMHG | SYSTOLIC BLOOD PRESSURE: 134 MMHG | BODY MASS INDEX: 27.95 KG/M2 | HEART RATE: 58 BPM | WEIGHT: 199.63 LBS

## 2021-10-26 DIAGNOSIS — I70.0 AORTIC ATHEROSCLEROSIS (HCC): ICD-10-CM

## 2021-10-26 DIAGNOSIS — Z00.00 ANNUAL PHYSICAL EXAM: Primary | ICD-10-CM

## 2021-10-26 DIAGNOSIS — I27.20 PULMONARY HYPERTENSION (HCC): ICD-10-CM

## 2021-10-26 DIAGNOSIS — N18.30 STAGE 3 CHRONIC KIDNEY DISEASE, UNSPECIFIED WHETHER STAGE 3A OR 3B CKD (HCC): ICD-10-CM

## 2021-10-26 DIAGNOSIS — I34.0 MITRAL VALVE INSUFFICIENCY, UNSPECIFIED ETIOLOGY: ICD-10-CM

## 2021-10-26 DIAGNOSIS — I48.0 PAROXYSMAL ATRIAL FIBRILLATION (HCC): ICD-10-CM

## 2021-10-26 DIAGNOSIS — I51.9 LV DYSFUNCTION: ICD-10-CM

## 2021-10-26 DIAGNOSIS — M85.80 OSTEOPENIA, UNSPECIFIED LOCATION: ICD-10-CM

## 2021-10-26 DIAGNOSIS — I50.22 CHRONIC SYSTOLIC HEART FAILURE (HCC): ICD-10-CM

## 2021-10-26 DIAGNOSIS — N20.0 KIDNEY STONES: ICD-10-CM

## 2021-10-26 DIAGNOSIS — Z00.00 ENCOUNTER FOR ANNUAL HEALTH EXAMINATION: ICD-10-CM

## 2021-10-26 DIAGNOSIS — I82.4Z2 ACUTE DEEP VEIN THROMBOSIS (DVT) OF DISTAL VEIN OF LEFT LOWER EXTREMITY (HCC): ICD-10-CM

## 2021-10-26 PROBLEM — I82.402 LEFT LEG DVT (HCC): Status: ACTIVE | Noted: 2021-10-01

## 2021-10-26 PROCEDURE — G0008 ADMIN INFLUENZA VIRUS VAC: HCPCS | Performed by: INTERNAL MEDICINE

## 2021-10-26 PROCEDURE — 90662 IIV NO PRSV INCREASED AG IM: CPT | Performed by: INTERNAL MEDICINE

## 2021-10-26 PROCEDURE — 99213 OFFICE O/P EST LOW 20 MIN: CPT | Performed by: INTERNAL MEDICINE

## 2021-10-26 PROCEDURE — G0439 PPPS, SUBSEQ VISIT: HCPCS | Performed by: INTERNAL MEDICINE

## 2021-10-26 RX ORDER — FUROSEMIDE 20 MG/1
20 TABLET ORAL DAILY
COMMUNITY
Start: 2020-09-25 | End: 2021-10-26

## 2021-10-26 RX ORDER — RIVAROXABAN 20 MG/1
20 TABLET, FILM COATED ORAL
COMMUNITY
Start: 2021-10-06

## 2021-10-26 NOTE — PROGRESS NOTES
HPI:   Fadumo Lora is an 82year old female who presents this morning for a Medicare Subsequent Annual Wellness visit (Pt already had Initial Annual Wellness). Last Medicare physical was October 2019.   Last year's Medicare physical was canceled bec Ability/Status   Aliyah Champion has a completely normal functional assessment! Please see flow sheet section for details.       Depression Screening (PHQ-2/PHQ-9): Over the LAST 2 WEEKS   Little interest or pleasure in doing things: Not at all  Feeling d CREATSERUM 0.98 01/14/2020    GLU 99 01/14/2020        CBC  (most recent labs)   Lab Results   Component Value Date    WBC 6.4 01/14/2020    HGB 13.7 01/14/2020    .0 01/14/2020        ALLERGIES:   She is allergic to sulfa antibiotics.     CURRENT smokeless tobacco. She reports previous alcohol use of about 0.8 standard drinks of alcohol per week. She reports that she does not use drugs.      REVIEW OF SYSTEMS:     REVIEW OF SYSTEMS:   GENERAL: No fever  LUNGS: No cough wheezing or shortness of breat what others are saying and make inappropriate responses: No I avoid social activities because I cannot hear well and fear I will reply improperly: No   Family members and friends have told me they think I may have hearing loss:  No               Visual Acui exam  High-dose influenza vaccine today. Recommend 2 doses of Shingrix at local pharmacy and Covid booster when available. Check CMP CBC and lipid profile soon when able. Order sent. Continue current medications and follow-up with Cardiology.   Again di good energy level?: Daily Walks  How would you describe your daily physical activity?: Light  How would you describe your current health state?: Good  How do you maintain positive mental well-being?: Social Interaction; Visiting Friends; Visiting Family Bone Density Screening    Bone density screening    Covered every 2 years after age 72 if diagnosed with risk of osteoporosis or estrogen deficiency.     Covered yearly for long-term glucocorticoid medication use (Steroids) No results found for this or an

## 2021-10-26 NOTE — PATIENT INSTRUCTIONS
Your blood pressure today was good at 134/76 and your exam was normal.  You received a high-dose flu shot today. Please get 2 doses of Shingrix vaccine from your pharmacy, and a Covid booster when available. Please come in soon for blood tests.   Continue Test Covered annually -   Bone Density Screening    Bone density screening    Covered every 2 years after age 72 if diagnosed with risk of osteoporosis or estrogen deficiency.     Covered yearly for long-term glucocorticoid medication use (Steroids) No resu different types of Advance Directives. It also has the State forms available on it's website for anyone to review and print using their home computer and printer. (the forms are also available in [address])  [email or web address]  This link also has informa

## 2021-11-09 ENCOUNTER — OFFICE VISIT (OUTPATIENT)
Dept: DERMATOLOGY CLINIC | Facility: CLINIC | Age: 86
End: 2021-11-09
Payer: MEDICARE

## 2021-11-09 DIAGNOSIS — L57.8 SUN-DAMAGED SKIN: ICD-10-CM

## 2021-11-09 DIAGNOSIS — D48.5 NEOPLASM OF UNCERTAIN BEHAVIOR OF SKIN: Primary | ICD-10-CM

## 2021-11-09 DIAGNOSIS — L57.0 ACTINIC KERATOSIS: ICD-10-CM

## 2021-11-09 DIAGNOSIS — L72.0 EPIDERMAL CYST: ICD-10-CM

## 2021-11-09 PROCEDURE — 11102 TANGNTL BX SKIN SINGLE LES: CPT | Performed by: DERMATOLOGY

## 2021-11-09 PROCEDURE — 88305 TISSUE EXAM BY PATHOLOGIST: CPT | Performed by: DERMATOLOGY

## 2021-11-09 PROCEDURE — 99213 OFFICE O/P EST LOW 20 MIN: CPT | Performed by: DERMATOLOGY

## 2021-11-09 PROCEDURE — 17000 DESTRUCT PREMALG LESION: CPT | Performed by: DERMATOLOGY

## 2021-11-09 NOTE — PROGRESS NOTES
HPI:     Chief Complaint     Lesion        HPI     Lesion      Additional comments: LOV 4/26/2021 Patient present with was 2 lesiions on her nose. Patient c/o lesion were scaley and flesh color , but has since resolve.  Patient has hx of AK              Les capsule by mouth daily.        Allergies:     Sulfa Antibiotics       HIVES    Past Medical History:   Diagnosis Date   • Acute systolic CHF (congestive heart failure) ([facility] [address] 12/2017    Cardiac cath 1-18 with no obstructive CAD   • Aortic atherosclerosis (HC Never used    Substance and Sexual Activity      Alcohol use: Not Currently        Alcohol/week: 0.8 standard drinks        Types: 1 Glasses of wine per week        Comment: Rare glass of wine      Drug use: No      Sexual activity: Not on file    Other To file  Intimate Partner Violence:       Fear of Current or Ex-Partner: Not on file      Emotionally Abused: Not on file      Physically Abused: Not on file      Sexually Abused: Not on file  Housing Stability:       Unable to Pay for Housing in the [address] [place] [address] Drive directions given. Sun-damaged skin-patient understands proper use of sunblock.     Orders Placed This Encounter      DESTRUCTION PREMALIGNANT LESIONS, FIRST LES      TANGENTIAL BIOPSY SKIN SINGLE LESION      Specimen to Pathology Tissue      Results From P

## 2021-11-13 ENCOUNTER — LAB ENCOUNTER (OUTPATIENT)
Dept: LAB | Facility: HOSPITAL | Age: 86
End: 2021-11-13
Attending: INTERNAL MEDICINE
Payer: MEDICARE

## 2021-11-13 DIAGNOSIS — Z00.00 ANNUAL PHYSICAL EXAM: ICD-10-CM

## 2021-11-13 DIAGNOSIS — I48.0 PAROXYSMAL ATRIAL FIBRILLATION (HCC): ICD-10-CM

## 2021-11-13 DIAGNOSIS — N18.30 STAGE 3 CHRONIC KIDNEY DISEASE, UNSPECIFIED WHETHER STAGE 3A OR 3B CKD (HCC): ICD-10-CM

## 2021-11-13 DIAGNOSIS — I70.0 AORTIC ATHEROSCLEROSIS (HCC): ICD-10-CM

## 2021-11-13 PROCEDURE — 80061 LIPID PANEL: CPT

## 2021-11-13 PROCEDURE — 36415 COLL VENOUS BLD VENIPUNCTURE: CPT

## 2021-11-13 PROCEDURE — 80053 COMPREHEN METABOLIC PANEL: CPT

## 2021-11-13 PROCEDURE — 85027 COMPLETE CBC AUTOMATED: CPT

## 2021-11-15 NOTE — PROGRESS NOTES
Patient informed of test results and all LSS' recommendations. Voiced understanding. Path faxed to Dr. Marivel Townsend, his contact info provided to pt. Pt did not want to schedule a full body exam with [facility] at this time.

## 2021-11-16 ENCOUNTER — TELEPHONE (OUTPATIENT)
Dept: DERMATOLOGY CLINIC | Facility: CLINIC | Age: 86
End: 2021-11-16

## 2021-11-16 NOTE — TELEPHONE ENCOUNTER
Patient called    Asking if it is okay to wait until April to see Rema Ward? First available appointment is in February but she will be in [place]. Or is there another MOHS Doctor she can be referred to?

## 2021-11-16 NOTE — TELEPHONE ENCOUNTER
Let patient know that it is probably okay to wait till April but I would really prefer her to have this taken care of before she goes to [place]. Please refer her to Dr. Monisha Block.   If Dr. Rodolfo Mcdaniel is able to accommodate her, then please fax her biopsy r

## 2021-11-16 NOTE — TELEPHONE ENCOUNTER
Pt informed of all below LSS' advice, voiced understanding. Dr. Hannah Felipe contact info sent via [employer] per request. Staff please fax path report to Dr. Eda Dickens - thank you. Pt will CB if she is unable to be seen sooner by Dr. Eda Dickens.

## 2021-11-16 NOTE — TELEPHONE ENCOUNTER
LOV 11/9/21 - Pt's pathology resulted in [address] on nose. Please see pt's message below and advise. Thank you.

## 2021-11-30 VITALS — BODY MASS INDEX: 27.72 KG/M2 | HEIGHT: 71 IN | WEIGHT: 198 LBS

## 2021-12-01 ENCOUNTER — LAB ENCOUNTER (OUTPATIENT)
Dept: LAB | Facility: HOSPITAL | Age: 86
End: 2021-12-01
Attending: INTERNAL MEDICINE
Payer: MEDICARE

## 2021-12-01 DIAGNOSIS — Z01.818 PREOP TESTING: ICD-10-CM

## 2021-12-02 ENCOUNTER — HOSPITAL ENCOUNTER (OUTPATIENT)
Dept: INTERVENTIONAL RADIOLOGY/VASCULAR | Facility: HOSPITAL | Age: 86
Discharge: HOME | End: 2021-12-02
Attending: INTERNAL MEDICINE | Admitting: INTERNAL MEDICINE
Payer: MEDICARE

## 2021-12-02 DIAGNOSIS — Z01.818 PREOP TESTING: Primary | ICD-10-CM

## 2021-12-02 DIAGNOSIS — I48.91 A-FIB (HCC): ICD-10-CM

## 2021-12-02 PROCEDURE — 93005 ELECTROCARDIOGRAM TRACING: CPT

## 2021-12-02 PROCEDURE — 93010 ELECTROCARDIOGRAM REPORT: CPT | Performed by: INTERNAL MEDICINE

## 2021-12-02 RX ORDER — SODIUM CHLORIDE 9 MG/ML
INJECTION, SOLUTION INTRAVENOUS CONTINUOUS
Status: DISCONTINUED | OUTPATIENT
Start: 2021-12-02 | End: 2021-12-02

## 2021-12-10 ENCOUNTER — TELEPHONE (OUTPATIENT)
Dept: INTERNAL MEDICINE CLINIC | Facility: CLINIC | Age: 86
End: 2021-12-10

## 2021-12-10 DIAGNOSIS — Z20.822 EXPOSURE TO CONFIRMED CASE OF COVID-19: Primary | ICD-10-CM

## 2021-12-10 NOTE — TELEPHONE ENCOUNTER
Patient stated that she has exposure to someone on 12/7/2021 that tested positive for COVID. Just found out today. Patient not having any symptoms but wanted to get an order for a COVID test. Patient is fully vaccinated.   Advised patient that should wait 5

## 2021-12-12 ENCOUNTER — LAB ENCOUNTER (OUTPATIENT)
Dept: LAB | Facility: HOSPITAL | Age: 86
End: 2021-12-12
Attending: INTERNAL MEDICINE
Payer: MEDICARE

## 2021-12-12 DIAGNOSIS — Z20.822 EXPOSURE TO CONFIRMED CASE OF COVID-19: ICD-10-CM

## 2021-12-28 ENCOUNTER — OFFICE VISIT (OUTPATIENT)
Dept: OPHTHALMOLOGY | Facility: CLINIC | Age: 86
End: 2021-12-28
Payer: MEDICARE

## 2021-12-28 DIAGNOSIS — H40.1132 PRIMARY OPEN ANGLE GLAUCOMA OF BOTH EYES, MODERATE STAGE: Primary | ICD-10-CM

## 2021-12-28 PROCEDURE — 99213 OFFICE O/P EST LOW 20 MIN: CPT | Performed by: OPHTHALMOLOGY

## 2021-12-28 RX ORDER — LATANOPROST 50 UG/ML
SOLUTION/ DROPS OPHTHALMIC
Qty: 7.5 ML | Refills: 3 | Status: SHIPPED | OUTPATIENT
Start: 2021-12-28

## 2021-12-28 NOTE — ASSESSMENT & PLAN NOTE
Discussed with patient Intraocular pressure stable, tolerating medications. Will continue same regimen of Latanoprost in both eyes at bedtime. Will have patient back in 4 months for a VF, OCT and dilated EE.

## 2021-12-28 NOTE — PATIENT INSTRUCTIONS
Primary open angle glaucoma of both eyes, moderate stage  Discussed with patient Intraocular pressure stable, tolerating medications. Will continue same regimen of Latanoprost in both eyes at bedtime.      Will have patient back in 4 months for a VF, OCT a

## 2021-12-28 NOTE — PROGRESS NOTES
Kelsey Cardozo is a 82year old female. HPI:     HPI     Pt is here for an IOP check. Pt is taking Latanoprost both eyes at bedtime as directed. Pt states vision is stable, she has no ocular complaints at this time.      Last edited by Myrtle Parish, OT Father    • Other (Pancreatic Cancer) Father    • Other (Renal Cell Carcinoma) Brother    • Crohn's Disease Brother    • Glaucoma Mother    • Glaucoma Paternal Aunt    • Macular degeneration Paternal Aunt        Social History: Social History    Tobacco Us no K spindle, MDF     Anterior Chamber Deep and quiet Deep and quiet    Iris Normal, No transillumination defects Normal, No transillumination defects    Lens PC IOL, 1+ opacity  PC IOL, 1+ opacity           Fundus Exam       Right Left    Disc Sloping mar

## 2022-01-21 ENCOUNTER — MED REC SCAN ONLY (OUTPATIENT)
Dept: INTERNAL MEDICINE CLINIC | Facility: CLINIC | Age: 87
End: 2022-01-21

## 2022-04-18 ENCOUNTER — TELEPHONE (OUTPATIENT)
Dept: DERMATOLOGY CLINIC | Facility: CLINIC | Age: 87
End: 2022-04-18

## 2022-04-18 NOTE — TELEPHONE ENCOUNTER
Call from [address] office    Asking for photos to be emailed to [email or web address]    Please call [phone] ask for MOHS team-Crystal

## 2022-04-23 ENCOUNTER — OFFICE VISIT (OUTPATIENT)
Dept: INTERNAL MEDICINE CLINIC | Facility: CLINIC | Age: 87
End: 2022-04-23
Payer: MEDICARE

## 2022-04-23 ENCOUNTER — HOSPITAL ENCOUNTER (OUTPATIENT)
Dept: GENERAL RADIOLOGY | Facility: HOSPITAL | Age: 87
Discharge: HOME | End: 2022-04-23
Attending: INTERNAL MEDICINE
Payer: MEDICARE

## 2022-04-23 VITALS
HEART RATE: 45 BPM | BODY MASS INDEX: 27.72 KG/M2 | DIASTOLIC BLOOD PRESSURE: 56 MMHG | HEIGHT: 71 IN | SYSTOLIC BLOOD PRESSURE: 134 MMHG | RESPIRATION RATE: 18 BRPM | WEIGHT: 198 LBS

## 2022-04-23 DIAGNOSIS — S80.11XA HEMATOMA OF RIGHT LOWER LEG: ICD-10-CM

## 2022-04-23 DIAGNOSIS — S80.11XA HEMATOMA OF RIGHT LOWER LEG: Primary | ICD-10-CM

## 2022-04-23 PROCEDURE — 73590 X-RAY EXAM OF LOWER LEG: CPT | Performed by: INTERNAL MEDICINE

## 2022-04-23 PROCEDURE — 99213 OFFICE O/P EST LOW 20 MIN: CPT | Performed by: INTERNAL MEDICINE

## 2022-04-23 RX ORDER — AMIODARONE HYDROCHLORIDE 400 MG/1
400 TABLET ORAL DAILY
COMMUNITY
Start: 2022-04-15

## 2022-04-23 NOTE — PATIENT INSTRUCTIONS
Await results of right lower leg x-ray. Please elevate your right leg as much as possible and apply ice to the affected area 2-3 times daily. Call if area does not continue to improve. Please schedule a Medicare physical in October.

## 2022-04-26 ENCOUNTER — OFFICE VISIT (OUTPATIENT)
Dept: OPHTHALMOLOGY | Facility: CLINIC | Age: 87
End: 2022-04-26
Payer: MEDICARE

## 2022-04-26 DIAGNOSIS — Z96.1 PSEUDOPHAKIA OF BOTH EYES: ICD-10-CM

## 2022-04-26 DIAGNOSIS — H43.393 VITREOUS FLOATERS OF BOTH EYES: ICD-10-CM

## 2022-04-26 DIAGNOSIS — H40.1132 PRIMARY OPEN ANGLE GLAUCOMA OF BOTH EYES, MODERATE STAGE: Primary | ICD-10-CM

## 2022-04-26 DIAGNOSIS — H26.493 AFTER-CATARACT OBSCURING VISION, BILATERAL: ICD-10-CM

## 2022-04-26 PROCEDURE — 92014 COMPRE OPH EXAM EST PT 1/>: CPT | Performed by: OPHTHALMOLOGY

## 2022-04-26 PROCEDURE — 92133 CPTRZD OPH DX IMG PST SGM ON: CPT | Performed by: OPHTHALMOLOGY

## 2022-04-26 PROCEDURE — 92083 EXTENDED VISUAL FIELD XM: CPT | Performed by: OPHTHALMOLOGY

## 2022-04-26 NOTE — ASSESSMENT & PLAN NOTE
Visual field and OCT completed in office today with stable results that were discussed with patient in office today. Discussed with patient Intraocular pressure stable, tolerating medications. Will continue same regimen of Latanoprost in both eyes at bedtime. Will have patient back in 4 months for a pressure check.

## 2022-04-26 NOTE — PATIENT INSTRUCTIONS
Primary open angle glaucoma of both eyes, moderate stage  Visual field and OCT completed in office today with stable results that were discussed with patient in office today. Discussed with patient Intraocular pressure stable, tolerating medications. Will continue same regimen of Latanoprost in both eyes at bedtime. Will have patient back in 4 months for a pressure check. 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 She can call the office to schedule a right or left YAG laser in the future if she notices decreased vision in either eye. Pseudophakia of both eyes  No treatment. Vitreous floaters of both eyes  No treatment.

## 2022-05-25 ENCOUNTER — LAB ENCOUNTER (OUTPATIENT)
Dept: LAB | Facility: HOSPITAL | Age: 87
End: 2022-05-25
Attending: INTERNAL MEDICINE
Payer: MEDICARE

## 2022-05-25 ENCOUNTER — NURSE TRIAGE (OUTPATIENT)
Dept: INTERNAL MEDICINE CLINIC | Facility: CLINIC | Age: 87
End: 2022-05-25

## 2022-05-25 DIAGNOSIS — R35.0 URINARY FREQUENCY: ICD-10-CM

## 2022-05-25 DIAGNOSIS — R35.0 URINARY FREQUENCY: Primary | ICD-10-CM

## 2022-05-25 LAB
BILIRUB UR QL: NEGATIVE
COLOR UR: YELLOW
GLUCOSE UR-MCNC: NEGATIVE MG/DL
KETONES UR-MCNC: NEGATIVE MG/DL
NITRITE UR QL STRIP.AUTO: NEGATIVE
PH UR: 7 [PH] (ref 5–8)
PROT UR-MCNC: 30 MG/DL
RBC #/AREA URNS AUTO: >10 /HPF
SP GR UR STRIP: 1.01 (ref 1–1.03)
UROBILINOGEN UR STRIP-ACNC: <2
VIT C UR-MCNC: 20 MG/DL
WBC #/AREA URNS AUTO: >50 /HPF
WBC CLUMPS UR QL AUTO: PRESENT /HPF

## 2022-05-25 PROCEDURE — 81001 URINALYSIS AUTO W/SCOPE: CPT

## 2022-05-25 PROCEDURE — 87186 SC STD MICRODIL/AGAR DIL: CPT

## 2022-05-25 PROCEDURE — 87086 URINE CULTURE/COLONY COUNT: CPT

## 2022-05-25 PROCEDURE — 87077 CULTURE AEROBIC IDENTIFY: CPT

## 2022-05-25 RX ORDER — NITROFURANTOIN 25; 75 MG/1; MG/1
100 CAPSULE ORAL 2 TIMES DAILY WITH MEALS
Qty: 10 CAPSULE | Refills: 0 | Status: SHIPPED | OUTPATIENT
Start: 2022-05-25 | End: 2022-05-30

## 2022-05-25 NOTE — TELEPHONE ENCOUNTER
Prescription for generic Macrobid 100 mg twice daily with meals for 5 days sent to local [address], pending urine culture results

## 2022-05-25 NOTE — TELEPHONE ENCOUNTER
Verified name and . Patient was advised of Dr. Ruben Parnell message as seen in previous charting note. Patient verbalizes understanding and agrees with plan.

## 2022-05-29 ENCOUNTER — LAB ENCOUNTER (OUTPATIENT)
Dept: LAB | Facility: HOSPITAL | Age: 87
End: 2022-05-29
Attending: INTERNAL MEDICINE
Payer: MEDICARE

## 2022-05-29 DIAGNOSIS — R60.0 LOCALIZED EDEMA: ICD-10-CM

## 2022-05-29 DIAGNOSIS — I48.0 PAROXYSMAL ATRIAL FIBRILLATION (HCC): ICD-10-CM

## 2022-05-29 DIAGNOSIS — R00.2 PALPITATIONS: ICD-10-CM

## 2022-05-29 DIAGNOSIS — I70.0 AORTIC ATHEROSCLEROSIS (HCC): Primary | ICD-10-CM

## 2022-05-29 LAB
ALBUMIN SERPL-MCNC: 3.3 G/DL (ref 3.4–5)
ALBUMIN/GLOB SERPL: 0.9 {RATIO} (ref 1–2)
ALP LIVER SERPL-CCNC: 98 U/L
ALT SERPL-CCNC: 18 U/L
ANION GAP SERPL CALC-SCNC: 7 MMOL/L (ref 0–18)
AST SERPL-CCNC: 18 U/L (ref 15–37)
BILIRUB SERPL-MCNC: 0.4 MG/DL (ref 0.1–2)
BUN BLD-MCNC: 13 MG/DL (ref 7–18)
BUN/CREAT SERPL: 12 (ref 10–20)
CALCIUM BLD-MCNC: 8.9 MG/DL (ref 8.5–10.1)
CHLORIDE SERPL-SCNC: 106 MMOL/L (ref 98–112)
CO2 SERPL-SCNC: 30 MMOL/L (ref 21–32)
CREAT BLD-MCNC: 1.08 MG/DL
FASTING STATUS PATIENT QL REPORTED: YES
GLOBULIN PLAS-MCNC: 3.7 G/DL (ref 2.8–4.4)
GLUCOSE BLD-MCNC: 97 MG/DL (ref 70–99)
OSMOLALITY SERPL CALC.SUM OF ELEC: 296 MOSM/KG (ref 275–295)
POTASSIUM SERPL-SCNC: 3.5 MMOL/L (ref 3.5–5.1)
PROT SERPL-MCNC: 7 G/DL (ref 6.4–8.2)
SODIUM SERPL-SCNC: 143 MMOL/L (ref 136–145)
T4 FREE SERPL-MCNC: 1.3 NG/DL (ref 0.8–1.7)
TSI SER-ACNC: 4.93 MIU/ML (ref 0.36–3.74)

## 2022-05-29 PROCEDURE — 84439 ASSAY OF FREE THYROXINE: CPT

## 2022-05-29 PROCEDURE — 36415 COLL VENOUS BLD VENIPUNCTURE: CPT

## 2022-05-29 PROCEDURE — 80053 COMPREHEN METABOLIC PANEL: CPT

## 2022-05-29 PROCEDURE — 84443 ASSAY THYROID STIM HORMONE: CPT

## 2022-07-19 ENCOUNTER — TELEPHONE (OUTPATIENT)
Dept: DERMATOLOGY CLINIC | Facility: CLINIC | Age: 87
End: 2022-07-19

## 2022-07-19 NOTE — TELEPHONE ENCOUNTER
Fax from [facility] [employer] office notes in [facility] inbox.      Call from [facility] [address] [place] [address] area was not seen by Dr. Ryan Westfall

## 2022-07-21 NOTE — TELEPHONE ENCOUNTER
Noted. LSS patient biopsy done in November. If unable to find the site then no further treatment at this time. Patient does not have any follow-up appointment scheduled. Does need to schedule this for a full body check. Should be seen in the next 3 to 4 months.

## 2022-07-22 ENCOUNTER — MED REC SCAN ONLY (OUTPATIENT)
Dept: DERMATOLOGY CLINIC | Facility: CLINIC | Age: 87
End: 2022-07-22

## 2022-08-30 ENCOUNTER — OFFICE VISIT (OUTPATIENT)
Dept: OPHTHALMOLOGY | Facility: CLINIC | Age: 87
End: 2022-08-30
Payer: MEDICARE

## 2022-08-30 DIAGNOSIS — H40.1132 PRIMARY OPEN ANGLE GLAUCOMA OF BOTH EYES, MODERATE STAGE: Primary | ICD-10-CM

## 2022-08-30 PROCEDURE — 99213 OFFICE O/P EST LOW 20 MIN: CPT | Performed by: OPHTHALMOLOGY

## 2022-08-30 NOTE — PATIENT INSTRUCTIONS
Primary open angle glaucoma of both eyes, moderate stage  Discussed with patient Intraocular pressure stable, tolerating medications. Will continue same regimen of Latanoprost in both eyes at bedtime. Will have patient back in 4 months for a pressure check.

## 2022-10-23 ENCOUNTER — LAB ENCOUNTER (OUTPATIENT)
Dept: LAB | Facility: HOSPITAL | Age: 87
End: 2022-10-23
Attending: INTERNAL MEDICINE
Payer: MEDICARE

## 2022-10-23 DIAGNOSIS — Z79.899 NEED FOR PROPHYLACTIC CHEMOTHERAPY: ICD-10-CM

## 2022-10-23 DIAGNOSIS — I50.22 CHRONIC SYSTOLIC HEART FAILURE (HCC): ICD-10-CM

## 2022-10-23 DIAGNOSIS — I48.0 PAROXYSMAL ATRIAL FIBRILLATION (HCC): Primary | ICD-10-CM

## 2022-10-23 LAB
ALBUMIN SERPL-MCNC: 3 G/DL (ref 3.4–5)
ALBUMIN/GLOB SERPL: 0.8 {RATIO} (ref 1–2)
ALP LIVER SERPL-CCNC: 131 U/L
ALT SERPL-CCNC: 30 U/L
ANION GAP SERPL CALC-SCNC: 5 MMOL/L (ref 0–18)
AST SERPL-CCNC: 29 U/L (ref 15–37)
BILIRUB SERPL-MCNC: 0.5 MG/DL (ref 0.1–2)
BUN BLD-MCNC: 10 MG/DL (ref 7–18)
BUN/CREAT SERPL: 10.9 (ref 10–20)
CALCIUM BLD-MCNC: 8.2 MG/DL (ref 8.5–10.1)
CHLORIDE SERPL-SCNC: 108 MMOL/L (ref 98–112)
CO2 SERPL-SCNC: 29 MMOL/L (ref 21–32)
CREAT BLD-MCNC: 0.92 MG/DL
FASTING STATUS PATIENT QL REPORTED: YES
GFR SERPLBLD BASED ON 1.73 SQ M-ARVRAT: 61 ML/MIN/1.73M2 (ref 60–?)
GLOBULIN PLAS-MCNC: 3.6 G/DL (ref 2.8–4.4)
GLUCOSE BLD-MCNC: 107 MG/DL (ref 70–99)
OSMOLALITY SERPL CALC.SUM OF ELEC: 294 MOSM/KG (ref 275–295)
POTASSIUM SERPL-SCNC: 3.6 MMOL/L (ref 3.5–5.1)
PROT SERPL-MCNC: 6.6 G/DL (ref 6.4–8.2)
SODIUM SERPL-SCNC: 142 MMOL/L (ref 136–145)
T4 FREE SERPL-MCNC: 1.6 NG/DL (ref 0.8–1.7)
TSI SER-ACNC: 4.07 MIU/ML (ref 0.36–3.74)

## 2022-10-23 PROCEDURE — 80053 COMPREHEN METABOLIC PANEL: CPT

## 2022-10-23 PROCEDURE — 84443 ASSAY THYROID STIM HORMONE: CPT

## 2022-10-23 PROCEDURE — 36415 COLL VENOUS BLD VENIPUNCTURE: CPT

## 2022-10-23 PROCEDURE — 84439 ASSAY OF FREE THYROXINE: CPT

## 2022-10-29 ENCOUNTER — HOSPITAL ENCOUNTER (OUTPATIENT)
Dept: GENERAL RADIOLOGY | Facility: HOSPITAL | Age: 87
Discharge: HOME | End: 2022-10-29
Attending: INTERNAL MEDICINE
Payer: MEDICARE

## 2022-10-29 DIAGNOSIS — I48.0 PAROXYSMAL ATRIAL FIBRILLATION (HCC): ICD-10-CM

## 2022-10-29 DIAGNOSIS — Z79.899 ON AMIODARONE THERAPY: ICD-10-CM

## 2022-10-29 PROCEDURE — 71046 X-RAY EXAM CHEST 2 VIEWS: CPT | Performed by: INTERNAL MEDICINE

## 2022-11-08 ENCOUNTER — OFFICE VISIT (OUTPATIENT)
Dept: INTERNAL MEDICINE CLINIC | Facility: CLINIC | Age: 87
End: 2022-11-08
Payer: MEDICARE

## 2022-11-08 VITALS
HEIGHT: 71 IN | HEART RATE: 66 BPM | BODY MASS INDEX: 25.57 KG/M2 | WEIGHT: 182.63 LBS | SYSTOLIC BLOOD PRESSURE: 130 MMHG | DIASTOLIC BLOOD PRESSURE: 66 MMHG

## 2022-11-08 DIAGNOSIS — I50.22 CHRONIC SYSTOLIC HEART FAILURE (HCC): ICD-10-CM

## 2022-11-08 DIAGNOSIS — I34.0 MITRAL VALVE INSUFFICIENCY, UNSPECIFIED ETIOLOGY: ICD-10-CM

## 2022-11-08 DIAGNOSIS — Z00.00 ANNUAL PHYSICAL EXAM: Primary | ICD-10-CM

## 2022-11-08 DIAGNOSIS — I48.0 PAROXYSMAL ATRIAL FIBRILLATION (HCC): ICD-10-CM

## 2022-11-08 DIAGNOSIS — I70.0 AORTIC ATHEROSCLEROSIS (HCC): ICD-10-CM

## 2022-11-08 DIAGNOSIS — Z00.00 ENCOUNTER FOR ANNUAL HEALTH EXAMINATION: ICD-10-CM

## 2022-11-08 DIAGNOSIS — N18.30 STAGE 3 CHRONIC KIDNEY DISEASE, UNSPECIFIED WHETHER STAGE 3A OR 3B CKD (HCC): ICD-10-CM

## 2022-11-08 DIAGNOSIS — I27.20 PULMONARY HYPERTENSION (HCC): ICD-10-CM

## 2022-11-08 PROCEDURE — G0439 PPPS, SUBSEQ VISIT: HCPCS | Performed by: INTERNAL MEDICINE

## 2022-11-08 PROCEDURE — 99213 OFFICE O/P EST LOW 20 MIN: CPT | Performed by: INTERNAL MEDICINE

## 2022-11-08 PROCEDURE — 90662 IIV NO PRSV INCREASED AG IM: CPT | Performed by: INTERNAL MEDICINE

## 2022-11-08 PROCEDURE — G0008 ADMIN INFLUENZA VIRUS VAC: HCPCS | Performed by: INTERNAL MEDICINE

## 2022-11-08 RX ORDER — AMIODARONE HYDROCHLORIDE 400 MG/1
1 TABLET ORAL DAILY
COMMUNITY
Start: 2022-01-19

## 2022-11-12 ENCOUNTER — LAB ENCOUNTER (OUTPATIENT)
Dept: LAB | Facility: HOSPITAL | Age: 87
End: 2022-11-12
Attending: INTERNAL MEDICINE
Payer: MEDICARE

## 2022-11-12 DIAGNOSIS — I50.22 CHRONIC SYSTOLIC HEART FAILURE (HCC): ICD-10-CM

## 2022-11-12 LAB
CHOLEST SERPL-MCNC: 195 MG/DL (ref ?–200)
DEPRECATED RDW RBC AUTO: 51.2 FL (ref 35.1–46.3)
ERYTHROCYTE [DISTWIDTH] IN BLOOD BY AUTOMATED COUNT: 14.6 % (ref 11–15)
FASTING PATIENT LIPID ANSWER: YES
HCT VFR BLD AUTO: 42.8 %
HDLC SERPL-MCNC: 77 MG/DL (ref 40–59)
HGB BLD-MCNC: 13.3 G/DL
LDLC SERPL CALC-MCNC: 93 MG/DL (ref ?–100)
MCH RBC QN AUTO: 29.3 PG (ref 26–34)
MCHC RBC AUTO-ENTMCNC: 31.1 G/DL (ref 31–37)
MCV RBC AUTO: 94.3 FL
NONHDLC SERPL-MCNC: 118 MG/DL (ref ?–130)
PLATELET # BLD AUTO: 240 10(3)UL (ref 150–450)
RBC # BLD AUTO: 4.54 X10(6)UL
TRIGL SERPL-MCNC: 150 MG/DL (ref 30–149)
VLDLC SERPL CALC-MCNC: 24 MG/DL (ref 0–30)
WBC # BLD AUTO: 6.7 X10(3) UL (ref 4–11)

## 2022-11-12 PROCEDURE — 85027 COMPLETE CBC AUTOMATED: CPT

## 2022-11-12 PROCEDURE — 36415 COLL VENOUS BLD VENIPUNCTURE: CPT

## 2022-11-12 PROCEDURE — 80061 LIPID PANEL: CPT

## 2023-01-03 ENCOUNTER — OFFICE VISIT (OUTPATIENT)
Dept: OPHTHALMOLOGY | Facility: CLINIC | Age: 88
End: 2023-01-03
Payer: MEDICARE

## 2023-01-03 DIAGNOSIS — H40.1132 PRIMARY OPEN ANGLE GLAUCOMA OF BOTH EYES, MODERATE STAGE: Primary | ICD-10-CM

## 2023-01-03 DIAGNOSIS — Z96.1 PSEUDOPHAKIA OF BOTH EYES: ICD-10-CM

## 2023-01-03 PROCEDURE — 99213 OFFICE O/P EST LOW 20 MIN: CPT | Performed by: OPHTHALMOLOGY

## 2023-01-03 RX ORDER — LATANOPROST 50 UG/ML
SOLUTION/ DROPS OPHTHALMIC
Qty: 7.5 ML | Refills: 3 | Status: SHIPPED | OUTPATIENT
Start: 2023-01-03

## 2023-01-03 NOTE — ASSESSMENT & PLAN NOTE
Discussed with patient Intraocular pressure stable, tolerating medications. Will continue same regimen of Latanoprost in both eyes at bedtime. Will have patient back in 4 months for a VF, OCT and dilated EE with Photos.

## 2023-01-03 NOTE — PATIENT INSTRUCTIONS
Primary open angle glaucoma of both eyes, moderate stage  Discussed with patient Intraocular pressure stable, tolerating medications. Will continue same regimen of Latanoprost in both eyes at bedtime. Will have patient back in 4 months for a VF, OCT and dilated EE with Photos. Pseudophakia of both eyes  No treatment. Continue with OTC reading glasses.

## 2023-02-23 ENCOUNTER — TELEPHONE (OUTPATIENT)
Dept: INTERNAL MEDICINE CLINIC | Facility: CLINIC | Age: 88
End: 2023-02-23

## 2023-02-23 NOTE — TELEPHONE ENCOUNTER
Patient called back stating the outside lab needed our office to fax a request over to obtain lab results. Request faxed to lab at [phone].

## 2023-02-23 NOTE — TELEPHONE ENCOUNTER
Patient calling (identified name and ) currently in [place]. Went to  twice for an UTI over the past 3-4 weeks. Had received Keflex QID x 5 days (unsure of dosage), then Nitrofurantoin 100 mg BID x 7 days. Patient is still experiencing pressure and burning. Patient is having the lab fax the results to Dr. Kyleigh Merlos office and asking if it can be reviewed. Please advise once results received.        If sending in a new Rx, please send to:   Ana M [address] [place], [place] [address] [address], [phone], [phone]

## 2023-02-24 RX ORDER — FLUCONAZOLE 200 MG/1
200 TABLET ORAL DAILY
Qty: 7 TABLET | Refills: 0 | Status: SHIPPED | OUTPATIENT
Start: 2023-02-24 | End: 2023-03-03

## 2023-02-24 NOTE — TELEPHONE ENCOUNTER
Patient called (identified name and ),   Asking if we received fax from Urgent Care yet? Advised there is no notation of results received. Advised her to locate copy of the results if she has them, and call back to read them to us. She will do this.

## 2023-02-24 NOTE — TELEPHONE ENCOUNTER
Urinalysis 2- with protein 30, ketones trace, blood moderate, leukocyte esterase large. Urine culture with 10-50,000 colonies of yeast presumptive positive for Candida albicans and normal urogenital kirsten. Recommend treatment with fluconazole 200 mg 1 tablet daily for 7 days. Prescription sent to [facility]. Please notify patient.

## 2023-02-24 NOTE — TELEPHONE ENCOUNTER
Spoke to patient and relayed Dr. Melissa Daugherty message below. Patient verbalized understanding and had no further questions.

## 2023-03-15 ENCOUNTER — HOSPITAL ENCOUNTER (EMERGENCY)
Facility: HOSPITAL | Age: 88
Discharge: HOME | End: 2023-03-15
Attending: EMERGENCY MEDICINE
Payer: MEDICARE

## 2023-03-15 ENCOUNTER — APPOINTMENT (OUTPATIENT)
Dept: GENERAL RADIOLOGY | Facility: HOSPITAL | Age: 88
End: 2023-03-15
Payer: MEDICARE

## 2023-03-15 VITALS
TEMPERATURE: 98 F | WEIGHT: 165 LBS | OXYGEN SATURATION: 97 % | HEART RATE: 51 BPM | SYSTOLIC BLOOD PRESSURE: 150 MMHG | DIASTOLIC BLOOD PRESSURE: 63 MMHG | RESPIRATION RATE: 15 BRPM | BODY MASS INDEX: 23.1 KG/M2 | HEIGHT: 71 IN

## 2023-03-15 DIAGNOSIS — I95.1 ORTHOSTATIC HYPOTENSION: Primary | ICD-10-CM

## 2023-03-15 LAB
ANION GAP SERPL CALC-SCNC: 10 MMOL/L (ref 0–18)
BASOPHILS # BLD AUTO: 0.06 X10(3) UL (ref 0–0.2)
BASOPHILS NFR BLD AUTO: 1.1 %
BUN BLD-MCNC: 16 MG/DL (ref 7–18)
BUN/CREAT SERPL: 12.9 (ref 10–20)
CALCIUM BLD-MCNC: 9.1 MG/DL (ref 8.5–10.1)
CHLORIDE SERPL-SCNC: 105 MMOL/L (ref 98–112)
CO2 SERPL-SCNC: 27 MMOL/L (ref 21–32)
CREAT BLD-MCNC: 1.24 MG/DL
DEPRECATED RDW RBC AUTO: 50.6 FL (ref 35.1–46.3)
EOSINOPHIL # BLD AUTO: 0.1 X10(3) UL (ref 0–0.7)
EOSINOPHIL NFR BLD AUTO: 1.8 %
ERYTHROCYTE [DISTWIDTH] IN BLOOD BY AUTOMATED COUNT: 14.6 % (ref 11–15)
GFR SERPLBLD BASED ON 1.73 SQ M-ARVRAT: 43 ML/MIN/1.73M2 (ref 60–?)
GLUCOSE BLD-MCNC: 112 MG/DL (ref 70–99)
HCT VFR BLD AUTO: 40.7 %
HGB BLD-MCNC: 13.1 G/DL
IMM GRANULOCYTES # BLD AUTO: 0.04 X10(3) UL (ref 0–1)
IMM GRANULOCYTES NFR BLD: 0.7 %
LYMPHOCYTES # BLD AUTO: 0.78 X10(3) UL (ref 1–4)
LYMPHOCYTES NFR BLD AUTO: 14.1 %
MCH RBC QN AUTO: 29.8 PG (ref 26–34)
MCHC RBC AUTO-ENTMCNC: 32.2 G/DL (ref 31–37)
MCV RBC AUTO: 92.5 FL
MONOCYTES # BLD AUTO: 0.53 X10(3) UL (ref 0.1–1)
MONOCYTES NFR BLD AUTO: 9.6 %
NEUTROPHILS # BLD AUTO: 4.02 X10 (3) UL (ref 1.5–7.7)
NEUTROPHILS # BLD AUTO: 4.02 X10(3) UL (ref 1.5–7.7)
NEUTROPHILS NFR BLD AUTO: 72.7 %
OSMOLALITY SERPL CALC.SUM OF ELEC: 296 MOSM/KG (ref 275–295)
PLATELET # BLD AUTO: 224 10(3)UL (ref 150–450)
POTASSIUM SERPL-SCNC: 3.7 MMOL/L (ref 3.5–5.1)
RBC # BLD AUTO: 4.4 X10(6)UL
SODIUM SERPL-SCNC: 142 MMOL/L (ref 136–145)
WBC # BLD AUTO: 5.5 X10(3) UL (ref 4–11)

## 2023-03-15 PROCEDURE — 36415 COLL VENOUS BLD VENIPUNCTURE: CPT

## 2023-03-15 PROCEDURE — 99285 EMERGENCY DEPT VISIT HI MDM: CPT

## 2023-03-15 PROCEDURE — 99284 EMERGENCY DEPT VISIT MOD MDM: CPT

## 2023-03-15 PROCEDURE — 80048 BASIC METABOLIC PNL TOTAL CA: CPT | Performed by: EMERGENCY MEDICINE

## 2023-03-15 PROCEDURE — 71045 X-RAY EXAM CHEST 1 VIEW: CPT

## 2023-03-15 PROCEDURE — 85025 COMPLETE CBC W/AUTO DIFF WBC: CPT

## 2023-03-15 PROCEDURE — 80048 BASIC METABOLIC PNL TOTAL CA: CPT

## 2023-03-15 PROCEDURE — 93010 ELECTROCARDIOGRAM REPORT: CPT

## 2023-03-15 PROCEDURE — 93005 ELECTROCARDIOGRAM TRACING: CPT

## 2023-03-15 PROCEDURE — 85025 COMPLETE CBC W/AUTO DIFF WBC: CPT | Performed by: EMERGENCY MEDICINE

## 2023-03-16 LAB
ATRIAL RATE: 277 BPM
P AXIS: 114 DEGREES
P-R INTERVAL: 162 MS
Q-T INTERVAL: 524 MS
QRS DURATION: 154 MS
QTC CALCULATION (BEZET): 501 MS
R AXIS: -53 DEGREES
T AXIS: 92 DEGREES
VENTRICULAR RATE: 55 BPM

## 2023-03-17 ENCOUNTER — LAB ENCOUNTER (OUTPATIENT)
Dept: LAB | Facility: HOSPITAL | Age: 88
End: 2023-03-17
Attending: NURSE PRACTITIONER
Payer: MEDICARE

## 2023-03-17 DIAGNOSIS — I48.0 PAROXYSMAL ATRIAL FIBRILLATION (HCC): Primary | ICD-10-CM

## 2023-03-17 DIAGNOSIS — R00.2 PALPITATIONS: ICD-10-CM

## 2023-03-17 LAB
T4 FREE SERPL-MCNC: 1.4 NG/DL (ref 0.8–1.7)
TSI SER-ACNC: 5.46 MIU/ML (ref 0.36–3.74)

## 2023-03-17 PROCEDURE — 36415 COLL VENOUS BLD VENIPUNCTURE: CPT

## 2023-03-17 PROCEDURE — 84443 ASSAY THYROID STIM HORMONE: CPT

## 2023-03-17 PROCEDURE — 84439 ASSAY OF FREE THYROXINE: CPT

## 2023-03-31 ENCOUNTER — LAB ENCOUNTER (OUTPATIENT)
Dept: LAB | Facility: HOSPITAL | Age: 88
End: 2023-03-31
Attending: INTERNAL MEDICINE
Payer: MEDICARE

## 2023-03-31 DIAGNOSIS — Z79.899 ON AMIODARONE THERAPY: ICD-10-CM

## 2023-03-31 DIAGNOSIS — I50.22 CHRONIC SYSTOLIC CHF (CONGESTIVE HEART FAILURE) (HCC): Primary | ICD-10-CM

## 2023-03-31 LAB
ALBUMIN SERPL-MCNC: 3 G/DL (ref 3.4–5)
ALBUMIN/GLOB SERPL: 0.8 {RATIO} (ref 1–2)
ALP LIVER SERPL-CCNC: 140 U/L
ALT SERPL-CCNC: 37 U/L
ANION GAP SERPL CALC-SCNC: 4 MMOL/L (ref 0–18)
AST SERPL-CCNC: 30 U/L (ref 15–37)
BILIRUB SERPL-MCNC: 0.4 MG/DL (ref 0.1–2)
BUN BLD-MCNC: 19 MG/DL (ref 7–18)
BUN/CREAT SERPL: 16.8 (ref 10–20)
CALCIUM BLD-MCNC: 8.9 MG/DL (ref 8.5–10.1)
CHLORIDE SERPL-SCNC: 106 MMOL/L (ref 98–112)
CO2 SERPL-SCNC: 31 MMOL/L (ref 21–32)
CREAT BLD-MCNC: 1.13 MG/DL
FASTING STATUS PATIENT QL REPORTED: YES
GFR SERPLBLD BASED ON 1.73 SQ M-ARVRAT: 48 ML/MIN/1.73M2 (ref 60–?)
GLOBULIN PLAS-MCNC: 4 G/DL (ref 2.8–4.4)
GLUCOSE BLD-MCNC: 106 MG/DL (ref 70–99)
OSMOLALITY SERPL CALC.SUM OF ELEC: 295 MOSM/KG (ref 275–295)
POTASSIUM SERPL-SCNC: 3.9 MMOL/L (ref 3.5–5.1)
PROT SERPL-MCNC: 7 G/DL (ref 6.4–8.2)
SODIUM SERPL-SCNC: 141 MMOL/L (ref 136–145)
TSI SER-ACNC: 7.18 MIU/ML (ref 0.36–3.74)

## 2023-03-31 PROCEDURE — 84443 ASSAY THYROID STIM HORMONE: CPT

## 2023-03-31 PROCEDURE — 80053 COMPREHEN METABOLIC PANEL: CPT

## 2023-03-31 PROCEDURE — 36415 COLL VENOUS BLD VENIPUNCTURE: CPT

## 2023-04-04 ENCOUNTER — OFFICE VISIT (OUTPATIENT)
Dept: INTERNAL MEDICINE CLINIC | Facility: CLINIC | Age: 88
End: 2023-04-04

## 2023-04-04 VITALS
BODY MASS INDEX: 23.8 KG/M2 | HEIGHT: 71 IN | HEART RATE: 53 BPM | SYSTOLIC BLOOD PRESSURE: 151 MMHG | DIASTOLIC BLOOD PRESSURE: 71 MMHG | WEIGHT: 170 LBS

## 2023-04-04 DIAGNOSIS — R11.0 NAUSEA: Primary | ICD-10-CM

## 2023-04-04 DIAGNOSIS — I95.1 ORTHOSTATIC HYPOTENSION: ICD-10-CM

## 2023-04-04 PROCEDURE — 99214 OFFICE O/P EST MOD 30 MIN: CPT | Performed by: INTERNAL MEDICINE

## 2023-04-04 PROCEDURE — 1126F AMNT PAIN NOTED NONE PRSNT: CPT | Performed by: INTERNAL MEDICINE

## 2023-04-04 RX ORDER — MIDODRINE HYDROCHLORIDE 5 MG/1
5 TABLET ORAL
Qty: 60 TABLET | Refills: 3 | Status: SHIPPED | OUTPATIENT
Start: 2023-04-04

## 2023-04-04 RX ORDER — ONDANSETRON 4 MG/1
4 TABLET, ORALLY DISINTEGRATING ORAL EVERY 8 HOURS PRN
Qty: 20 TABLET | Refills: 1 | Status: SHIPPED | OUTPATIENT
Start: 2023-04-04

## 2023-04-04 NOTE — PATIENT INSTRUCTIONS
Monitor nausea off amiodarone. Begin midodrine 5 mg twice daily before meals, and use Zofran 4 mg 3 times daily as needed for nausea. Return visit in 1 month for follow-up.

## 2023-05-05 ENCOUNTER — OFFICE VISIT (OUTPATIENT)
Dept: INTERNAL MEDICINE CLINIC | Facility: CLINIC | Age: 88
End: 2023-05-05

## 2023-05-05 VITALS
SYSTOLIC BLOOD PRESSURE: 137 MMHG | HEIGHT: 71 IN | DIASTOLIC BLOOD PRESSURE: 73 MMHG | BODY MASS INDEX: 23.69 KG/M2 | HEART RATE: 62 BPM | WEIGHT: 169.19 LBS

## 2023-05-05 DIAGNOSIS — R11.0 NAUSEA: ICD-10-CM

## 2023-05-05 DIAGNOSIS — I95.1 ORTHOSTATIC HYPOTENSION: Primary | ICD-10-CM

## 2023-05-05 PROCEDURE — 99214 OFFICE O/P EST MOD 30 MIN: CPT | Performed by: INTERNAL MEDICINE

## 2023-05-05 PROCEDURE — 1126F AMNT PAIN NOTED NONE PRSNT: CPT | Performed by: INTERNAL MEDICINE

## 2023-05-05 RX ORDER — TITANIUM DIOXIDE, OCTINOXATE, ZINC OXIDE 4.61; 1.6; .78 G/40ML; G/40ML; G/40ML
CREAM TOPICAL
COMMUNITY
Start: 2023-04-04

## 2023-05-05 NOTE — PATIENT INSTRUCTIONS
Your blood pressure readings improved-146/70 lying down and 102/60 standing. Please continue midodrine twice daily. Please schedule a Medicare physical in November. Return earlier if nausea recurs or lightheadedness develops.

## 2023-05-16 ENCOUNTER — OFFICE VISIT (OUTPATIENT)
Dept: OPHTHALMOLOGY | Facility: CLINIC | Age: 88
End: 2023-05-16

## 2023-05-16 DIAGNOSIS — Z96.1 PSEUDOPHAKIA OF BOTH EYES: ICD-10-CM

## 2023-05-16 DIAGNOSIS — H40.1132 PRIMARY OPEN ANGLE GLAUCOMA OF BOTH EYES, MODERATE STAGE: Primary | ICD-10-CM

## 2023-05-16 DIAGNOSIS — H43.393 VITREOUS FLOATERS OF BOTH EYES: ICD-10-CM

## 2023-05-16 PROCEDURE — 1126F AMNT PAIN NOTED NONE PRSNT: CPT | Performed by: OPHTHALMOLOGY

## 2023-05-16 PROCEDURE — 92014 COMPRE OPH EXAM EST PT 1/>: CPT | Performed by: OPHTHALMOLOGY

## 2023-05-16 PROCEDURE — 92133 CPTRZD OPH DX IMG PST SGM ON: CPT | Performed by: OPHTHALMOLOGY

## 2023-05-16 PROCEDURE — 92250 FUNDUS PHOTOGRAPHY W/I&R: CPT | Performed by: OPHTHALMOLOGY

## 2023-05-16 PROCEDURE — 92083 EXTENDED VISUAL FIELD XM: CPT | Performed by: OPHTHALMOLOGY

## 2023-05-16 NOTE — PATIENT INSTRUCTIONS
Primary open angle glaucoma of both eyes, moderate stage  Visual field and OCT completed in office today with stable results that were discussed with patient in office today. Photos were taken today to document optic nerves. Discussed with patient Intraocular pressure stable, tolerating medications. Will continue same regimen of Latanoprost in both eyes at bedtime. Will have patient back in 4 months for pressure check. Pseudophakia of both eyes  No treatment. Suggest +3.00  over the counter glasses for reading. Vitreous floaters of both eyes  No treatment. 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 She can call the office to schedule a right or left YAG laser in the future if she notices decreased vision in either eye.

## 2023-05-16 NOTE — ASSESSMENT & PLAN NOTE
Visual field and OCT completed in office today with stable results that were discussed with patient in office today. Photos were taken today to document optic nerves. Discussed with patient Intraocular pressure stable, tolerating medications. Will continue same regimen of Latanoprost in both eyes at bedtime. Will have patient back in 4 months for pressure check.

## 2023-07-23 RX ORDER — MIDODRINE HYDROCHLORIDE 5 MG/1
5 TABLET ORAL
Qty: 60 TABLET | Refills: 5 | Status: SHIPPED | OUTPATIENT
Start: 2023-07-23

## 2023-07-23 NOTE — TELEPHONE ENCOUNTER
Please review. Protocol Failed or has No Protocol. Requested Prescriptions   Pending Prescriptions Disp Refills    MIDODRINE 5 MG Oral Tab [Pharmacy Med Name: Midodrine Hcl 5 Mg Tab [facility]] 60 tablet 0     Sig: Take 1 tablet (5 mg total) by mouth 2 (two) times daily before meals.        There is no refill protocol information for this order          Future Appointments         Provider Department Appt Notes    In 1 month MD Parris Gale[place] [facility]/ 4 mos IOP    In 3 months Va [place] MD [place], [address] last one done 11/08/2022            Recent Outpatient Visits              2 months ago Primary open angle glaucoma of both eyes, moderate stage    Nate Asif Larey Neigh, MD    Office Visit    2 months ago Orthostatic hypotension    Brooklyn Thomas MD    Office Visit    3 months ago Nausea    Eryn Falcon MD    Office Visit    6 months ago Primary open angle glaucoma of both eyes, moderate stage    Pratik Asif MD    Office Visit    8 months ago Annual physical exam    Brooklyn Thomas MD    Office Visit

## 2023-09-03 ENCOUNTER — HOSPITAL ENCOUNTER (EMERGENCY)
Facility: HOSPITAL | Age: 88
Discharge: HOME | End: 2023-09-03
Attending: EMERGENCY MEDICINE
Payer: MEDICARE

## 2023-09-03 VITALS
TEMPERATURE: 97 F | RESPIRATION RATE: 22 BRPM | OXYGEN SATURATION: 97 % | HEART RATE: 60 BPM | BODY MASS INDEX: 23.1 KG/M2 | SYSTOLIC BLOOD PRESSURE: 154 MMHG | WEIGHT: 165 LBS | HEIGHT: 71 IN | DIASTOLIC BLOOD PRESSURE: 72 MMHG

## 2023-09-03 DIAGNOSIS — N30.01 ACUTE CYSTITIS WITH HEMATURIA: Primary | ICD-10-CM

## 2023-09-03 LAB
BILIRUB UR QL: NEGATIVE
COLOR UR: YELLOW
GLUCOSE UR-MCNC: NORMAL MG/DL
KETONES UR-MCNC: NEGATIVE MG/DL
LEUKOCYTE ESTERASE UR QL STRIP.AUTO: 500
NITRITE UR QL STRIP.AUTO: NEGATIVE
PH UR: 6 [PH] (ref 5–8)
PROT UR-MCNC: 30 MG/DL
RBC #/AREA URNS AUTO: >10 /HPF
SP GR UR STRIP: 1.02 (ref 1–1.03)
UROBILINOGEN UR STRIP-ACNC: 2
WBC #/AREA URNS AUTO: >50 /HPF

## 2023-09-03 PROCEDURE — 99283 EMERGENCY DEPT VISIT LOW MDM: CPT

## 2023-09-03 PROCEDURE — 99284 EMERGENCY DEPT VISIT MOD MDM: CPT

## 2023-09-03 PROCEDURE — 87086 URINE CULTURE/COLONY COUNT: CPT | Performed by: EMERGENCY MEDICINE

## 2023-09-03 PROCEDURE — 81001 URINALYSIS AUTO W/SCOPE: CPT | Performed by: EMERGENCY MEDICINE

## 2023-09-03 RX ORDER — CEPHALEXIN 500 MG/1
500 CAPSULE ORAL 2 TIMES DAILY
Qty: 10 CAPSULE | Refills: 0 | Status: SHIPPED | OUTPATIENT
Start: 2023-09-03 | End: 2023-09-08

## 2023-09-03 RX ORDER — CEPHALEXIN 500 MG/1
500 CAPSULE ORAL ONCE
Status: COMPLETED | OUTPATIENT
Start: 2023-09-03 | End: 2023-09-03

## 2023-09-04 NOTE — ED QUICK NOTES
Call received from lab saying the urine culture tube was contaminated, and another urine sample is needed to run a culture. Patient notified of need for additional urine. She just voided, but will drink water to attempt providing more urine before being discharged.

## 2023-09-06 ENCOUNTER — PATIENT MESSAGE (OUTPATIENT)
Dept: INTERNAL MEDICINE CLINIC | Facility: CLINIC | Age: 88
End: 2023-09-06

## 2023-09-07 NOTE — TELEPHONE ENCOUNTER
Dr. Valente Plummer, please see patient's MyChart message below and advise if ok for patient to discontinue antibiotic based on urine culture result or other recommendations. Thank you. Urine culture result collected 09/03/23 shows no growth:  URINE CULTURE No Growth at 18-24 hrs.            Resulting Agency: [facility])           Specimen Collected: 09/03/23 10:52 PM Last Resulted: 09/05/23  6:53 AM

## 2023-09-08 ENCOUNTER — PATIENT OUTREACH (OUTPATIENT)
Dept: CASE MANAGEMENT | Age: 88
End: 2023-09-08

## 2023-09-12 ENCOUNTER — LAB ENCOUNTER (OUTPATIENT)
Dept: LAB | Facility: HOSPITAL | Age: 88
End: 2023-09-12
Attending: INTERNAL MEDICINE
Payer: MEDICARE

## 2023-09-12 ENCOUNTER — NURSE TRIAGE (OUTPATIENT)
Dept: INTERNAL MEDICINE CLINIC | Facility: CLINIC | Age: 88
End: 2023-09-12

## 2023-09-12 ENCOUNTER — TELEPHONE (OUTPATIENT)
Dept: INTERNAL MEDICINE CLINIC | Facility: CLINIC | Age: 88
End: 2023-09-12

## 2023-09-12 DIAGNOSIS — R35.0 URINARY FREQUENCY: ICD-10-CM

## 2023-09-12 DIAGNOSIS — R35.0 URINARY FREQUENCY: Primary | ICD-10-CM

## 2023-09-12 LAB
BILIRUB UR QL: NEGATIVE
GLUCOSE UR-MCNC: NORMAL MG/DL
KETONES UR-MCNC: NEGATIVE MG/DL
LEUKOCYTE ESTERASE UR QL STRIP.AUTO: 500
NITRITE UR QL STRIP.AUTO: NEGATIVE
PH UR: 5.5 [PH] (ref 5–8)
PROT UR-MCNC: 20 MG/DL
RBC #/AREA URNS AUTO: >10 /HPF
SP GR UR STRIP: 1.01 (ref 1–1.03)
UROBILINOGEN UR STRIP-ACNC: NORMAL
WBC #/AREA URNS AUTO: >50 /HPF

## 2023-09-12 PROCEDURE — 81001 URINALYSIS AUTO W/SCOPE: CPT

## 2023-09-12 PROCEDURE — 87086 URINE CULTURE/COLONY COUNT: CPT

## 2023-09-12 RX ORDER — NITROFURANTOIN 25; 75 MG/1; MG/1
100 CAPSULE ORAL 2 TIMES DAILY WITH MEALS
Qty: 10 CAPSULE | Refills: 0 | Status: SHIPPED | OUTPATIENT
Start: 2023-09-12 | End: 2023-09-17

## 2023-09-18 ENCOUNTER — OFFICE VISIT (OUTPATIENT)
Dept: INTERNAL MEDICINE CLINIC | Facility: CLINIC | Age: 88
End: 2023-09-18

## 2023-09-18 VITALS
HEIGHT: 71 IN | HEART RATE: 94 BPM | WEIGHT: 168.63 LBS | SYSTOLIC BLOOD PRESSURE: 130 MMHG | DIASTOLIC BLOOD PRESSURE: 76 MMHG | BODY MASS INDEX: 23.61 KG/M2

## 2023-09-18 DIAGNOSIS — R30.0 DYSURIA: Primary | ICD-10-CM

## 2023-09-18 PROCEDURE — 1126F AMNT PAIN NOTED NONE PRSNT: CPT | Performed by: INTERNAL MEDICINE

## 2023-09-18 PROCEDURE — 99213 OFFICE O/P EST LOW 20 MIN: CPT | Performed by: INTERNAL MEDICINE

## 2023-09-18 RX ORDER — MIDODRINE HYDROCHLORIDE 5 MG/1
5 TABLET ORAL
Qty: 180 TABLET | Refills: 1 | Status: SHIPPED | OUTPATIENT
Start: 2023-09-18

## 2023-09-21 ENCOUNTER — TELEPHONE (OUTPATIENT)
Dept: UROLOGY | Facility: HOSPITAL | Age: 88
End: 2023-09-21

## 2023-09-21 NOTE — TELEPHONE ENCOUNTER
Incoming TC from patient with c/o vaginal itching x 1 week. Indicates her PCP referred her to Dr. Vidhi Dotson. Patient is not yet an established patient and has her first apt with Dr. Vidhi Dotson, 11/10/23. Informed patient that since she has not established care within in our clinic as of yet I have to refer her back to her PCP or [facility] for her current symptoms. Informed patient I will add her to our apt wait list. Patient understands and is in agreement with plan.

## 2023-10-10 ENCOUNTER — OFFICE VISIT (OUTPATIENT)
Dept: OPHTHALMOLOGY | Facility: CLINIC | Age: 88
End: 2023-10-10

## 2023-10-10 DIAGNOSIS — H40.1132 PRIMARY OPEN ANGLE GLAUCOMA OF BOTH EYES, MODERATE STAGE: Primary | ICD-10-CM

## 2023-10-10 PROCEDURE — 99213 OFFICE O/P EST LOW 20 MIN: CPT | Performed by: OPHTHALMOLOGY

## 2023-10-10 PROCEDURE — 1126F AMNT PAIN NOTED NONE PRSNT: CPT | Performed by: OPHTHALMOLOGY

## 2023-10-10 RX ORDER — LATANOPROST 50 UG/ML
SOLUTION/ DROPS OPHTHALMIC
Qty: 7.5 ML | Refills: 3 | Status: SHIPPED | OUTPATIENT
Start: 2023-10-10

## 2023-10-10 NOTE — ASSESSMENT & PLAN NOTE
Discussed with patient Intraocular pressure stable, tolerating medications. Will continue same regimen of Latanoprost in both eyes at bedtime. Will have patient back in 6 months for a VF, OCT and dilated EE (coming back in 6 months because she will be out of town from [place]).

## 2023-10-10 NOTE — PROGRESS NOTES
Donny Gr is a 82year old female. HPI:     HPI    Pt in today for an IOP check. Per pt is taking Latanoprost in both eyes at bedtime as directed. Pt states vision is stable. Last edited by Aleksander Castillo OT on 10/10/2023 11:49 AM.        Patient History:  Past Medical History:   Diagnosis Date    Acute systolic CHF (congestive heart failure) ([address] 12/2017    Cardiac cath 1-18 with no obstructive CAD; Echo 1-18 with EF 35-40% ; Lexiscan GXT 6-22 with mild LVE, normal perfusion, EF 62%    Aortic atherosclerosis (HCC)     CXR 12-17    BCC (basal cell carcinoma) 2021    right tip of nose    Chronic kidney disease, stage 3 ([address]     Glaucoma 2012 2012- START Latanoprost qhs OS, based on OCT    Herpes zoster 09/2010    Right leg    Kidney stones 06/2001    Treated with lithotripsy    Left bundle branch block     Left leg DVT ([address] 10/2021    Partially occlusive thrombus left proximal femoral vein    LV dysfunction     Echo 1-18 with EF 35-40%; cardiac cath 1-18 with EF 40% and no obstructive CAD; Echo 7-20 with EF 40-45%, mild MR, mild TR    Mitral regurgitation     Echo 1-18 with moderate-severe MR and TR; JAI 1-18 with moderate-severe MR    Osteopenia 09/2011    T score -1.3 hip and -0.7 lumbar spine    Paroxysmal atrial fibrillation ([address] 03/2000    Nuclear treadmill stress test 7-15 normal with EF 57%; symptomatic recurrence 1-18 s/p cardioversion    Pulmonary hypertension ([address]     Echo 1-18 with peak pulmonary artery pressure 44 mmHg       Surgical History: Donny Gr has a past surgical history that includes tubal ligation (1976); appendectomy (July 2000) (Ruptured appendicitis); lithotripsy (Left, June 2001); other (July 2011) (ORIF distal right radius fracture); Cataract extraction w/  intraocular lens implant (Right, 4/13/17) (Dr. Alfredo Childers @ [address]);  Cataract extraction w/  intraocular lens implant (Left, 06/2017) (Dr. Alfredo Childers ); and cardioversion (01/2018) (Symptomatic atrial fibrillation). Family History   Problem Relation Age of Onset    Diabetes Father     Other (Pancreatic Cancer) Father     Other (Renal Cell Carcinoma) Brother     Crohn's Disease Brother     Glaucoma Mother     Glaucoma Paternal Aunt     Macular degeneration Paternal Aunt        Social History:   Social History     Socioeconomic History    Marital status:    Tobacco Use    Smoking status: Never    Smokeless tobacco: Never   Vaping Use    Vaping Use: Never used   Substance and Sexual Activity    Alcohol use: Not Currently     Alcohol/week: 0.8 standard drinks of alcohol     Types: 1 Glasses of wine per week    Drug use: No   Other Topics Concern    Caffeine Concern Yes     Comment: 24oz soda daily    Pt has a pacemaker No    Pt has a defibrillator No    Reaction to local anesthetic No       Medications:  Current Outpatient Medications   Medication Sig Dispense Refill    latanoprost 0.005 % Ophthalmic Solution INSTILL 1 DROP IN BOTH EYES AT BEDTIME 7.5 mL 3    midodrine 5 MG Oral Tab Take 1 tablet (5 mg total) by mouth 2 (two) times daily before meals. 180 tablet 1    Cranberry 400 MG Oral Cap cranberry 400 mg capsule, [RxNorm: 0]      ondansetron 4 MG Oral Tablet Dispersible Take 1 tablet (4 mg total) by mouth every 8 (eight) hours as needed for Nausea. 20 tablet 1    XARELTO 20 MG Oral Tab Take 1 tablet (20 mg total) by mouth daily with food. acetaminophen 500 MG Oral Tab Take 1 tablet (500 mg total) by mouth as needed for Pain. Calcium Carb-Cholecalciferol (CALCIUM CARBONATE-VITAMIN D3 OR) Take 2 tablets by mouth daily. Multiple Vitamin (MULTIVITAMINS) Oral Cap Take 1 capsule by mouth daily.          Allergies:    Sulfa Antibiotics       HIVES    ROS:       PHYSICAL EXAM:     Base Eye Exam       Visual Acuity (Snellen - Linear)         Right Left    Dist sc 20/40 -2 20/30 -2    Dist ph sc 20/25 -3 20/25 -3              Tonometry (Applanation, 11:18 AM)         Right Left    Pressure 18 17 Pachymetry (6/12/07)         Right Left    Thickness 520/+1 522/+1              Pupils         Pupils    Right PERRL    Left PERRL              Neuro/Psych       Oriented x3: Yes                  Slit Lamp and Fundus Exam       Slit Lamp Exam         Right Left    Lids/Lashes Dermatochalasis, Meibomian gland dysfunction Meibomian gland dysfunction, Dermatochalasis    Conjunctiva/Sclera Normal Normal    Cornea MDF superiorly, Vortex keratopathy inferiorly MDF superiorly, Vortex keratopathy inferiorly    Anterior Chamber Deep and quiet Deep and quiet    Iris Normal Normal    Lens PC IOL with  2+ PC opacity  PC IOL with 2+ PC opacity               Fundus Exam         Right Left    Disc Sloping margin, Peripapillary atrophy Sloping margin, Peripapillary atrophy    C/D Ratio 0.9 0.8                  Refraction       Wearing Rx         Sphere Cylinder    Right +2.50 Sphere    Left +2.50 Sphere      Type: Forgot OTC reading only                     ASSESSMENT/PLAN:     Diagnoses and Plan:     Primary open angle glaucoma of both eyes, moderate stage  Discussed with patient Intraocular pressure stable, tolerating medications. Will continue same regimen of Latanoprost in both eyes at bedtime. Will have patient back in 6 months for a VF, OCT and dilated EE (coming back in 6 months because she will be out of town from [place]).       Orders Placed This Encounter      OCT, Optic Nerve - OU - Both Eyes      Brewer Visual Field - OU - Both Eyes      Meds This Visit:  Requested Prescriptions     Signed Prescriptions Disp Refills    latanoprost 0.005 % Ophthalmic Solution 7.5 mL 3     Sig: INSTILL 1 DROP IN BOTH EYES AT BEDTIME        Follow up instructions:  Return in about 6 months (around 4/10/2024) for Visual Field, OCT, complete exam.    10/10/2023  Scribed by: Morgan Lakhani MD

## 2023-10-10 NOTE — PATIENT INSTRUCTIONS
Primary open angle glaucoma of both eyes, moderate stage  Discussed with patient Intraocular pressure stable, tolerating medications. Will continue same regimen of Latanoprost in both eyes at bedtime. Will have patient back in 6 months for a VF, OCT and dilated EE (coming back in 6 months because she will be out of town from [place]).

## 2023-10-27 ENCOUNTER — OFFICE VISIT (OUTPATIENT)
Dept: UROLOGY | Facility: HOSPITAL | Age: 88
End: 2023-10-27
Attending: OBSTETRICS & GYNECOLOGY

## 2023-10-27 VITALS — WEIGHT: 168 LBS | BODY MASS INDEX: 23.52 KG/M2 | HEIGHT: 71 IN | RESPIRATION RATE: 18 BRPM

## 2023-10-27 DIAGNOSIS — Z87.440 HISTORY OF UTI: ICD-10-CM

## 2023-10-27 DIAGNOSIS — R35.1 NOCTURIA: ICD-10-CM

## 2023-10-27 DIAGNOSIS — N95.2 POSTMENOPAUSAL ATROPHIC VAGINITIS: Primary | ICD-10-CM

## 2023-10-27 DIAGNOSIS — N39.41 URGE INCONTINENCE: ICD-10-CM

## 2023-10-27 DIAGNOSIS — N90.4 VULVAR DYSTROPHY: ICD-10-CM

## 2023-10-27 DIAGNOSIS — N81.84 PELVIC MUSCLE WASTING: ICD-10-CM

## 2023-10-27 DIAGNOSIS — N39.3 FEMALE STRESS INCONTINENCE: ICD-10-CM

## 2023-10-27 DIAGNOSIS — N89.8 VAGINAL DISCHARGE: ICD-10-CM

## 2023-10-27 LAB
BILIRUB UR QL: NEGATIVE
CLARITY UR: CLEAR
CONTROL RUN WITHIN 24 HOURS?: YES
GLUCOSE UR-MCNC: NORMAL MG/DL
HGB UR QL STRIP.AUTO: NEGATIVE
KETONES UR-MCNC: NEGATIVE MG/DL
LEUKOCYTE ESTERASE UR QL STRIP.AUTO: NEGATIVE
LEUKOCYTE ESTERASE URINE: NEGATIVE
NITRITE UR QL STRIP.AUTO: NEGATIVE
NITRITE URINE: NEGATIVE
PH UR: 6.5 [PH] (ref 5–8)
PROT UR-MCNC: NEGATIVE MG/DL
SP GR UR STRIP: 1.01 (ref 1–1.03)
UROBILINOGEN UR STRIP-ACNC: NORMAL

## 2023-10-27 PROCEDURE — 87808 TRICHOMONAS ASSAY W/OPTIC: CPT | Performed by: OBSTETRICS & GYNECOLOGY

## 2023-10-27 PROCEDURE — 81002 URINALYSIS NONAUTO W/O SCOPE: CPT | Performed by: OBSTETRICS & GYNECOLOGY

## 2023-10-27 PROCEDURE — 99212 OFFICE O/P EST SF 10 MIN: CPT

## 2023-10-27 PROCEDURE — 87106 FUNGI IDENTIFICATION YEAST: CPT | Performed by: OBSTETRICS & GYNECOLOGY

## 2023-10-27 PROCEDURE — 87086 URINE CULTURE/COLONY COUNT: CPT | Performed by: OBSTETRICS & GYNECOLOGY

## 2023-10-27 PROCEDURE — 51701 INSERT BLADDER CATHETER: CPT | Performed by: OBSTETRICS & GYNECOLOGY

## 2023-10-27 PROCEDURE — 87205 SMEAR GRAM STAIN: CPT | Performed by: OBSTETRICS & GYNECOLOGY

## 2023-10-27 PROCEDURE — 81003 URINALYSIS AUTO W/O SCOPE: CPT | Performed by: OBSTETRICS & GYNECOLOGY

## 2023-10-27 RX ORDER — ASCORBIC ACID 500 MG
500 TABLET ORAL DAILY
COMMUNITY

## 2023-10-27 RX ORDER — CLOBETASOL PROPIONATE 0.5 MG/G
1 OINTMENT TOPICAL 2 TIMES DAILY
Qty: 60 G | Refills: 3 | Status: SHIPPED | OUTPATIENT
Start: 2023-10-27

## 2023-10-27 RX ORDER — ESTRADIOL 0.1 MG/G
CREAM VAGINAL
Qty: 42 G | Refills: 3 | Status: SHIPPED | OUTPATIENT
Start: 2023-10-27

## 2023-10-27 NOTE — PATIENT INSTRUCTIONS
Start vaginal estrogen twice weekly inside the vagina  Start clobetasol ointment twice daily outside for one month, then daily for one month, then maintenance 2x/week  Work to improve bowels  Time your trips to the toilet  We will call with test results  We'll see you back to check progress in 6 wks  You can call with signs or symptoms of UTI, you can urinate in the provided container and we'll follow results

## 2023-10-29 LAB
GENITAL VAGINOSIS SCREEN: POSITIVE
TRICHOMONAS SCREEN: NEGATIVE

## 2023-10-30 ENCOUNTER — TELEPHONE (OUTPATIENT)
Dept: UROLOGY | Facility: HOSPITAL | Age: 88
End: 2023-10-30

## 2023-10-30 DIAGNOSIS — B96.89 BACTERIAL VAGINOSIS: Primary | ICD-10-CM

## 2023-10-30 DIAGNOSIS — N76.0 BACTERIAL VAGINOSIS: Primary | ICD-10-CM

## 2023-10-30 RX ORDER — METRONIDAZOLE 7.5 MG/G
5 GEL VAGINAL NIGHTLY
Qty: 70 G | Refills: 0 | Status: SHIPPED | OUTPATIENT
Start: 2023-10-30 | End: 2023-11-04

## 2023-10-30 NOTE — TELEPHONE ENCOUNTER
TC to pt informing her of vaginal swab positive for BV. BECKY Wade, PAC metrogel 5g vaginally x 5 nights. Prescription sent to pt's preferred pharmacy. Encouraged to call with any questions or concerns. Pt understands and agrees to plan.

## 2023-11-14 ENCOUNTER — OFFICE VISIT (OUTPATIENT)
Dept: INTERNAL MEDICINE CLINIC | Facility: CLINIC | Age: 88
End: 2023-11-14
Payer: MEDICARE

## 2023-11-14 VITALS
BODY MASS INDEX: 24.13 KG/M2 | HEIGHT: 71 IN | DIASTOLIC BLOOD PRESSURE: 85 MMHG | HEART RATE: 79 BPM | WEIGHT: 172.38 LBS | SYSTOLIC BLOOD PRESSURE: 137 MMHG

## 2023-11-14 DIAGNOSIS — I48.0 PAROXYSMAL ATRIAL FIBRILLATION (HCC): ICD-10-CM

## 2023-11-14 DIAGNOSIS — I27.20 PULMONARY HYPERTENSION (HCC): ICD-10-CM

## 2023-11-14 DIAGNOSIS — Z00.00 ANNUAL PHYSICAL EXAM: Primary | ICD-10-CM

## 2023-11-14 DIAGNOSIS — Z00.00 ENCOUNTER FOR ANNUAL HEALTH EXAMINATION: ICD-10-CM

## 2023-11-14 DIAGNOSIS — I50.22 CHRONIC SYSTOLIC HEART FAILURE (HCC): ICD-10-CM

## 2023-11-14 DIAGNOSIS — I70.0 AORTIC ATHEROSCLEROSIS (HCC): ICD-10-CM

## 2023-11-14 DIAGNOSIS — I34.0 MITRAL VALVE INSUFFICIENCY, UNSPECIFIED ETIOLOGY: ICD-10-CM

## 2023-11-14 DIAGNOSIS — I95.1 ORTHOSTATIC HYPOTENSION: ICD-10-CM

## 2023-11-14 DIAGNOSIS — N18.30 STAGE 3 CHRONIC KIDNEY DISEASE, UNSPECIFIED WHETHER STAGE 3A OR 3B CKD (HCC): ICD-10-CM

## 2023-11-14 PROCEDURE — G0439 PPPS, SUBSEQ VISIT: HCPCS | Performed by: INTERNAL MEDICINE

## 2023-11-14 PROCEDURE — 1126F AMNT PAIN NOTED NONE PRSNT: CPT | Performed by: INTERNAL MEDICINE

## 2023-11-14 PROCEDURE — 99213 OFFICE O/P EST LOW 20 MIN: CPT | Performed by: INTERNAL MEDICINE

## 2023-11-14 RX ORDER — LATANOPROST 50 UG/ML
SOLUTION/ DROPS OPHTHALMIC
Qty: 7.5 ML | Refills: 3 | Status: CANCELLED | OUTPATIENT
Start: 2023-11-14

## 2023-11-14 RX ORDER — MIDODRINE HYDROCHLORIDE 5 MG/1
5 TABLET ORAL
Qty: 180 TABLET | Refills: 1 | Status: SHIPPED | OUTPATIENT
Start: 2023-11-14

## 2023-11-18 ENCOUNTER — LAB ENCOUNTER (OUTPATIENT)
Dept: LAB | Facility: HOSPITAL | Age: 88
End: 2023-11-18
Attending: INTERNAL MEDICINE
Payer: MEDICARE

## 2023-11-18 DIAGNOSIS — Z00.00 ANNUAL PHYSICAL EXAM: ICD-10-CM

## 2023-11-18 LAB
ALBUMIN SERPL-MCNC: 4 G/DL (ref 3.2–4.8)
ALBUMIN/GLOB SERPL: 1.5 {RATIO} (ref 1–2)
ALP LIVER SERPL-CCNC: 85 U/L
ALT SERPL-CCNC: <7 U/L
ANION GAP SERPL CALC-SCNC: 6 MMOL/L (ref 0–18)
AST SERPL-CCNC: 18 U/L (ref ?–34)
BILIRUB SERPL-MCNC: 0.4 MG/DL (ref 0.2–1.1)
BUN BLD-MCNC: 23 MG/DL (ref 9–23)
BUN/CREAT SERPL: 18.9 (ref 10–20)
CALCIUM BLD-MCNC: 9.4 MG/DL (ref 8.7–10.4)
CHLORIDE SERPL-SCNC: 106 MMOL/L (ref 98–112)
CHOLEST SERPL-MCNC: 179 MG/DL (ref ?–200)
CO2 SERPL-SCNC: 30 MMOL/L (ref 21–32)
CREAT BLD-MCNC: 1.22 MG/DL
DEPRECATED RDW RBC AUTO: 51.2 FL (ref 35.1–46.3)
EGFRCR SERPLBLD CKD-EPI 2021: 43 ML/MIN/1.73M2 (ref 60–?)
ERYTHROCYTE [DISTWIDTH] IN BLOOD BY AUTOMATED COUNT: 14.6 % (ref 11–15)
FASTING PATIENT LIPID ANSWER: YES
FASTING STATUS PATIENT QL REPORTED: YES
GLOBULIN PLAS-MCNC: 2.6 G/DL (ref 2.8–4.4)
GLUCOSE BLD-MCNC: 100 MG/DL (ref 70–99)
HCT VFR BLD AUTO: 40.9 %
HDLC SERPL-MCNC: 78 MG/DL (ref 40–59)
HGB BLD-MCNC: 12.8 G/DL
LDLC SERPL CALC-MCNC: 86 MG/DL (ref ?–100)
MCH RBC QN AUTO: 29.2 PG (ref 26–34)
MCHC RBC AUTO-ENTMCNC: 31.3 G/DL (ref 31–37)
MCV RBC AUTO: 93.2 FL
NONHDLC SERPL-MCNC: 101 MG/DL (ref ?–130)
OSMOLALITY SERPL CALC.SUM OF ELEC: 298 MOSM/KG (ref 275–295)
PLATELET # BLD AUTO: 202 10(3)UL (ref 150–450)
POTASSIUM SERPL-SCNC: 4.2 MMOL/L (ref 3.5–5.1)
PROT SERPL-MCNC: 6.6 G/DL (ref 5.7–8.2)
RBC # BLD AUTO: 4.39 X10(6)UL
SODIUM SERPL-SCNC: 142 MMOL/L (ref 136–145)
T4 FREE SERPL-MCNC: 1.2 NG/DL (ref 0.8–1.7)
TRIGL SERPL-MCNC: 82 MG/DL (ref 30–149)
TSI SER-ACNC: 8.12 MIU/ML (ref 0.55–4.78)
VLDLC SERPL CALC-MCNC: 13 MG/DL (ref 0–30)
WBC # BLD AUTO: 5.7 X10(3) UL (ref 4–11)

## 2023-11-18 PROCEDURE — 80053 COMPREHEN METABOLIC PANEL: CPT

## 2023-11-18 PROCEDURE — 36415 COLL VENOUS BLD VENIPUNCTURE: CPT

## 2023-11-18 PROCEDURE — 84439 ASSAY OF FREE THYROXINE: CPT

## 2023-11-18 PROCEDURE — 80061 LIPID PANEL: CPT

## 2023-11-18 PROCEDURE — 85027 COMPLETE CBC AUTOMATED: CPT

## 2023-11-18 PROCEDURE — 84443 ASSAY THYROID STIM HORMONE: CPT

## 2023-12-12 ENCOUNTER — HOSPITAL ENCOUNTER (OUTPATIENT)
Dept: INTERVENTIONAL RADIOLOGY/VASCULAR | Facility: HOSPITAL | Age: 88
Discharge: HOME | End: 2023-12-12
Attending: INTERNAL MEDICINE | Admitting: INTERNAL MEDICINE
Payer: MEDICARE

## 2023-12-12 ENCOUNTER — LAB ENCOUNTER (OUTPATIENT)
Dept: LAB | Facility: HOSPITAL | Age: 88
End: 2023-12-12
Attending: INTERNAL MEDICINE
Payer: MEDICARE

## 2023-12-12 VITALS
SYSTOLIC BLOOD PRESSURE: 128 MMHG | RESPIRATION RATE: 17 BRPM | WEIGHT: 174 LBS | OXYGEN SATURATION: 99 % | HEIGHT: 71 IN | TEMPERATURE: 97 F | BODY MASS INDEX: 24.36 KG/M2 | DIASTOLIC BLOOD PRESSURE: 70 MMHG | HEART RATE: 58 BPM

## 2023-12-12 DIAGNOSIS — I48.91 A-FIB (HCC): ICD-10-CM

## 2023-12-12 DIAGNOSIS — I48.0 PAROXYSMAL ATRIAL FIBRILLATION (HCC): Primary | ICD-10-CM

## 2023-12-12 DIAGNOSIS — I48.92 ATRIAL FLUTTER (HCC): ICD-10-CM

## 2023-12-12 LAB
ANION GAP SERPL CALC-SCNC: 4 MMOL/L (ref 0–18)
BUN BLD-MCNC: 13 MG/DL (ref 9–23)
BUN/CREAT SERPL: 11.4 (ref 10–20)
CALCIUM BLD-MCNC: 9.2 MG/DL (ref 8.7–10.4)
CHLORIDE SERPL-SCNC: 104 MMOL/L (ref 98–112)
CO2 SERPL-SCNC: 30 MMOL/L (ref 21–32)
CREAT BLD-MCNC: 1.14 MG/DL
EGFRCR SERPLBLD CKD-EPI 2021: 47 ML/MIN/1.73M2 (ref 60–?)
FASTING STATUS PATIENT QL REPORTED: NO
GLUCOSE BLD-MCNC: 101 MG/DL (ref 70–99)
OSMOLALITY SERPL CALC.SUM OF ELEC: 286 MOSM/KG (ref 275–295)
POTASSIUM SERPL-SCNC: 4.5 MMOL/L (ref 3.5–5.1)
SODIUM SERPL-SCNC: 138 MMOL/L (ref 136–145)

## 2023-12-12 PROCEDURE — 5A2204Z RESTORATION OF CARDIAC RHYTHM, SINGLE: ICD-10-PCS | Performed by: INTERNAL MEDICINE

## 2023-12-12 PROCEDURE — 36415 COLL VENOUS BLD VENIPUNCTURE: CPT

## 2023-12-12 PROCEDURE — 99152 MOD SED SAME PHYS/QHP 5/>YRS: CPT | Performed by: INTERNAL MEDICINE

## 2023-12-12 PROCEDURE — 80048 BASIC METABOLIC PNL TOTAL CA: CPT

## 2023-12-12 PROCEDURE — 93005 ELECTROCARDIOGRAM TRACING: CPT

## 2023-12-12 PROCEDURE — 93010 ELECTROCARDIOGRAM REPORT: CPT | Performed by: INTERNAL MEDICINE

## 2023-12-12 PROCEDURE — 92960 CARDIOVERSION ELECTRIC EXT: CPT | Performed by: INTERNAL MEDICINE

## 2023-12-12 RX ORDER — METHOHEXITAL IN WATER/PF 100MG/10ML
SYRINGE (ML) INTRAVENOUS
Status: COMPLETED
Start: 2023-12-12 | End: 2023-12-12

## 2023-12-12 RX ORDER — SODIUM CHLORIDE 9 MG/ML
INJECTION, SOLUTION INTRAVENOUS CONTINUOUS
Status: DISCONTINUED | OUTPATIENT
Start: 2023-12-12 | End: 2023-12-12

## 2023-12-12 RX ORDER — METHOHEXITAL IN WATER/PF 100MG/10ML
50 SYRINGE (ML) INTRAVENOUS ONCE
Status: COMPLETED | OUTPATIENT
Start: 2023-12-12 | End: 2023-12-12

## 2023-12-12 RX ADMIN — METHOHEXITAL IN WATER/PF 50 MG: 100MG/10ML SYRINGE (ML) INTRAVENOUS at 16:45:00

## 2023-12-12 NOTE — H&P
The above referenced H&P was reviewed by Jhonatan Stevens MD on 12/12/2023, the patient was examined and no significant changes have occurred in the patient's condition since the H&P was performed. Risks and benefits were discussed, proceed with procedure as planned.

## 2023-12-12 NOTE — PROCEDURES
PROCEDURE(S) PERFORMED:    1. Cardioversion. 2.     Sedation     :  Nya Fong MD    ANESTHESIA:  IV sedation. Moderate conscious sedation for this procedure was administered and personally monitored for 15 minutes     INDICATION:  Persistent atrial fibrillation. COMPLICATIONS:  None. METHODS:  The patient was brought to the outpatient cardiac telemetry unit in a fasting and nonsedated state after providing informed consent. IV sedation was administered during continuous ECG, pulse oximeter and noninvasive hemodynamic monitoring. After administering a total of 60 mg of IV Brevital in intermittent boluses, the desired level of sedation was achieved. A single 200-joule synchronized biphasic shock was delivered with successful conversion to sinus rhythm. The patient remained on telemetry until they fully recovered. There were no complications. CONCLUSIONS:  1. Successful cardioversion.     Nya Fong MD  12/12/2023

## 2023-12-13 LAB
ATRIAL RATE: 68 BPM
P AXIS: 74 DEGREES
P-R INTERVAL: 254 MS
Q-T INTERVAL: 474 MS
QRS DURATION: 98 MS
QTC CALCULATION (BEZET): 477 MS
R AXIS: -35 DEGREES
T AXIS: 94 DEGREES
VENTRICULAR RATE: 61 BPM

## 2023-12-13 NOTE — IVS NOTE
DISCHARGE NOTE     Pt is able to sit up and ambulate without difficulty. Pt voided and tolerated fluids. Procedural site remains dry and intact with good circulation, motion and sensation. No signs or symptoms of bleeding/hematoma noted. Pt denies any pain or discomfort at this time. IV access removed  Instruction provided, patient/family verbalizes understanding.       Pt discharge via wheelchair to [address] with son       Follow up Appointment: 1 week with Dr. Giovani Oviedo Prescription: none

## 2023-12-15 ENCOUNTER — OFFICE VISIT (OUTPATIENT)
Dept: UROLOGY | Facility: HOSPITAL | Age: 88
End: 2023-12-15
Attending: OBSTETRICS & GYNECOLOGY
Payer: MEDICARE

## 2023-12-15 VITALS — RESPIRATION RATE: 18 BRPM | WEIGHT: 174 LBS | HEIGHT: 71 IN | BODY MASS INDEX: 24.36 KG/M2

## 2023-12-15 DIAGNOSIS — N81.84 PELVIC MUSCLE WASTING: ICD-10-CM

## 2023-12-15 DIAGNOSIS — N90.4 VULVAR DYSTROPHY: ICD-10-CM

## 2023-12-15 DIAGNOSIS — N95.2 POSTMENOPAUSAL ATROPHIC VAGINITIS: Primary | ICD-10-CM

## 2023-12-15 DIAGNOSIS — Z87.440 HISTORY OF UTI: ICD-10-CM

## 2023-12-15 PROCEDURE — 99212 OFFICE O/P EST SF 10 MIN: CPT

## 2024-01-25 ENCOUNTER — OFFICE VISIT (OUTPATIENT)
Dept: UROLOGY | Facility: HOSPITAL | Age: 89
End: 2024-01-25
Attending: PHYSICIAN ASSISTANT
Payer: MEDICARE

## 2024-01-25 ENCOUNTER — TELEPHONE (OUTPATIENT)
Dept: UROLOGY | Facility: HOSPITAL | Age: 89
End: 2024-01-25

## 2024-01-25 VITALS — HEIGHT: 71 IN | BODY MASS INDEX: 24.36 KG/M2 | RESPIRATION RATE: 18 BRPM | WEIGHT: 174 LBS

## 2024-01-25 DIAGNOSIS — R35.1 NOCTURIA: ICD-10-CM

## 2024-01-25 DIAGNOSIS — N39.41 URGE INCONTINENCE: ICD-10-CM

## 2024-01-25 DIAGNOSIS — N95.2 POSTMENOPAUSAL ATROPHIC VAGINITIS: ICD-10-CM

## 2024-01-25 DIAGNOSIS — N90.4 VULVAR DYSTROPHY: ICD-10-CM

## 2024-01-25 DIAGNOSIS — N89.8 VAGINAL DISCHARGE: Primary | ICD-10-CM

## 2024-01-25 DIAGNOSIS — L30.9 VULVAR DERMATITIS: ICD-10-CM

## 2024-01-25 LAB
BILIRUB UR QL: NEGATIVE
CLARITY UR: CLEAR
COLOR UR: YELLOW
CONTROL RUN WITHIN 24 HOURS?: YES
GLUCOSE UR-MCNC: NORMAL MG/DL
HYALINE CASTS #/AREA URNS AUTO: PRESENT /LPF
KETONES UR-MCNC: NEGATIVE MG/DL
LEUKOCYTE ESTERASE UR QL STRIP.AUTO: NEGATIVE
LEUKOCYTE ESTERASE URINE: NEGATIVE
NITRITE UR QL STRIP.AUTO: NEGATIVE
NITRITE URINE: NEGATIVE
PH UR: 5.5 [PH] (ref 5–8)
RBC #/AREA URNS AUTO: >10 /HPF
SP GR UR STRIP: 1.02 (ref 1–1.03)
UROBILINOGEN UR STRIP-ACNC: 2

## 2024-01-25 PROCEDURE — 81514 NFCT DS BV&VAGINITIS DNA ALG: CPT | Performed by: PHYSICIAN ASSISTANT

## 2024-01-25 PROCEDURE — 81002 URINALYSIS NONAUTO W/O SCOPE: CPT | Performed by: PHYSICIAN ASSISTANT

## 2024-01-25 PROCEDURE — 81001 URINALYSIS AUTO W/SCOPE: CPT | Performed by: PHYSICIAN ASSISTANT

## 2024-01-25 PROCEDURE — 51701 INSERT BLADDER CATHETER: CPT | Performed by: PHYSICIAN ASSISTANT

## 2024-01-25 PROCEDURE — 87086 URINE CULTURE/COLONY COUNT: CPT | Performed by: PHYSICIAN ASSISTANT

## 2024-01-25 PROCEDURE — 99212 OFFICE O/P EST SF 10 MIN: CPT

## 2024-01-25 RX ORDER — ESTRADIOL 0.1 MG/G
CREAM VAGINAL
Qty: 42 G | Refills: 3 | Status: SHIPPED | OUTPATIENT
Start: 2024-01-25

## 2024-01-25 RX ORDER — CLOTRIMAZOLE AND BETAMETHASONE DIPROPIONATE 10; .64 MG/G; MG/G
CREAM TOPICAL
Qty: 45 G | Refills: 0 | Status: SHIPPED | OUTPATIENT
Start: 2024-01-25

## 2024-01-25 NOTE — PATIENT INSTRUCTIONS
Continue to use vaginal estrogen cream twice weekly at bedtime    Stop clobetasol ointment for 1 week    Start lotrisone cream - thin layer applied to outside skin twice a day x 7 days    Then resume clobetasol ointment twice a week    Follow up in April as planned

## 2024-01-25 NOTE — TELEPHONE ENCOUNTER
Incoming telephone call received from patient.     Patient complains of vaginal discomfort which started three days ago.   The patient reports using Estrace Cream twice weekly.  Clobetasol two times per week. Clobetasol regimen reviewed.  The patient questioning if she can use Metrogel ordered on 10/27/2023.  Informed the patient will need to obtain a vaginal swab  The patient denies UTI symptoms at this time.     The patient scheduled with Jacy RYAN to obtain a vaginal swab.     All questions answered    Encouraged to call if symptoms worsen or fail to improve     Patient understands and agrees to plan

## 2024-01-25 NOTE — PROGRESS NOTES
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 Reports bowels improved greatly.)  Wears pad day?: 0  Wears Pad Night?: 0  Activities are limited by UI/POP?: No  Currently Sexually Active: No  Avoids sexual activity due to: Other (No partner.)    Vitals:  Resp 18   Ht 71\"   Wt 174 lb (78.9 kg)   BMI 24.27 kg/m²     GENERAL EXAM:  GENERAL: alert & oriented, NAD  HEENT: NC/AT, sclera without injection  SKIN: no rashes  LUNGS:  without increased respiratory effort  ABDOMEN: soft, non-tender, non-distended, no masses appreciated  EXT: no edema    PELVIC EXAM:  Ext. Gen: +atrophy, no lesions, some lichenification at union of labial minora/majora, +redness/irritation of bilat labia majora  Urethra: +atrophy, nontender  Bladder: some fullness, nontender  Vagina: +atrophy, +clear to yellow vag discharge, swab obtained  Cervix: no bleeding, no lesions, nontender  Uterus: +mobile  Adnexa:no masses, nontender  Perineum: nontender, some lichenification  Anus: wnl  Rectum: defer     PELVIS FLOOR NEUROMUSCULAR FUNCTION:  Strength:  1 and Unable to hold greater than 3 sec  Perineal Sensation:  Normal        PELVIC SUPPORT:  [place]:  1  Ant:  1  Post:  1  CST:  negative  UVJ: somewhat hypermobile    After obtaining patient's verbal consent, sterile technique used to prep urethra and collect urine.  Dip with +blood.  Sent for UA & culture.    Impression/Plan:    ICD-10-CM    1. Vaginal discharge  N89.8 Vaginitis Vaginosis PCR Panel      2. Urge incontinence  N39.41 POCT urinalysis dipstick[[id number]]      Urinalysis, Routine     Urine Culture, Routine     Straight Cath PVR      3. Postmenopausal atrophic vaginitis  N95.2 estradiol (ESTRACE) 0.1 MG/GM Vaginal Cream      4. 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 Nocturia  R35.1 POCT urinalysis dipstick[[id number]]     Urinalysis, Routine     Urine Culture, Routine     Straight Cath PVR      6. Vulvar dermatitis  L30.9 clotrimazole-betamethasone 1-0.05 % External Cream          Discussion Items:   Discussed mgmt of vulvovaginal atrophy with vaginal estrogen cream. Reviewed associated benefits, risks, alternatives, and goals. Recommend low dose 2-3x/week treatment.    Discussed management options for vulvar dystrophy.  Discussed chronic nature of symptoms.  Discussed steroid treatments and vulvar care, discussed associated treatment risks & benefits.  Discussed need for future vulvar biopsy if sx persist despite mgmt as prescribed.    Diagnostic Items:  Vag swab  UA & UCx    Treatment Plan:  Follow vag swab & UCx, treat if +  Start lotrisone cream BID x 7d (hold clobetasol ointment while using this then resume for vulvar dystrophy)  Continue vag estrogen cream as prescribed  Bowel regimen  Bladder diet/drill  Pelvic floor exercises  Call with s/sx of UTI    All questions answered  She understands and agrees to plan    Return in about 3 months (around 4/25/2024) for skin check as planned.    Jacy Gonzales PA-C

## 2024-01-26 LAB
BV BACTERIA DNA VAG QL NAA+PROBE: NEGATIVE
C GLABRATA DNA VAG QL NAA+PROBE: NEGATIVE
C KRUSEI DNA VAG QL NAA+PROBE: NEGATIVE
CANDIDA DNA VAG QL NAA+PROBE: NEGATIVE
T VAGINALIS DNA VAG QL NAA+PROBE: NEGATIVE

## 2024-01-29 ENCOUNTER — TELEPHONE (OUTPATIENT)
Dept: UROLOGY | Facility: HOSPITAL | Age: 89
End: 2024-01-29

## 2024-01-29 DIAGNOSIS — R31.29 MICROSCOPIC HEMATURIA: Primary | ICD-10-CM

## 2024-01-29 NOTE — TELEPHONE ENCOUNTER
Outgoing telephone call to patient  The vaginal swab on 1/25/24 showed no vaginal infections.   The urine culture obtained on 1/25/24  showed no growth, however, the urinalysis obtained on 1/25/24  showed RBCs > 10.  This indicates a further evaluation of the urinary tract system, which includes a renal ultrasound gram and an office cystoscopy.   Renal ultrasound and office cystoscopy ordered by Dr. Baker.  Central scheduling phone number provided to the patient - [phone] to schedule renal ultrasound.  Patient understands and agrees to plan.   Encouraged to call clinic with questions or concerns.  Order placed in [facility] for renal ultrasound   Patient connected to the  to schedule office cystoscopy.

## 2024-02-14 ENCOUNTER — HOSPITAL ENCOUNTER (OUTPATIENT)
Dept: ULTRASOUND IMAGING | Facility: HOSPITAL | Age: 89
Discharge: HOME | End: 2024-02-14
Attending: PHYSICIAN ASSISTANT
Payer: MEDICARE

## 2024-02-14 DIAGNOSIS — R31.29 MICROSCOPIC HEMATURIA: ICD-10-CM

## 2024-02-14 PROCEDURE — 76775 US EXAM ABDO BACK WALL LIM: CPT | Performed by: PHYSICIAN ASSISTANT

## 2024-02-15 ENCOUNTER — TELEPHONE (OUTPATIENT)
Dept: UROLOGY | Facility: HOSPITAL | Age: 89
End: 2024-02-15

## 2024-02-15 DIAGNOSIS — R93.89 THICKENED ENDOMETRIUM: ICD-10-CM

## 2024-02-15 DIAGNOSIS — R93.429 ABNORMAL RENAL ULTRASOUND: Primary | ICD-10-CM

## 2024-02-15 NOTE — TELEPHONE ENCOUNTER
TC to pt informing of results from renal US. BECKY Gonzales, PAC CT urogram to further evaluate possible 5mm stone, and pelvic US to further evaluate thickened endometrium. Phone number to Central Scheduling provided. Pt has cysto scheduled tmrw. Informed pt if she is unable to get CT and US completed prior to cysto, to call our office back to reschedule cysto for after imaging completed. Pt understands and agrees to plan.

## 2024-02-18 ENCOUNTER — APPOINTMENT (OUTPATIENT)
Dept: GENERAL RADIOLOGY | Facility: HOSPITAL | Age: 89
End: 2024-02-18
Attending: EMERGENCY MEDICINE
Payer: MEDICARE

## 2024-02-18 ENCOUNTER — HOSPITAL ENCOUNTER (OUTPATIENT)
Facility: HOSPITAL | Age: 89
Setting detail: OBSERVATION
Discharge: HOME | End: 2024-02-20
Attending: EMERGENCY MEDICINE | Admitting: INTERNAL MEDICINE
Payer: MEDICARE

## 2024-02-18 DIAGNOSIS — I48.91 ATRIAL FIBRILLATION WITH NORMAL VENTRICULAR RATE (HCC): Primary | ICD-10-CM

## 2024-02-18 PROBLEM — R07.89 OTHER CHEST PAIN: Status: ACTIVE | Noted: 2017-12-31

## 2024-02-18 LAB
ALBUMIN SERPL-MCNC: 3.8 G/DL (ref 3.2–4.8)
ALBUMIN/GLOB SERPL: 1.5 {RATIO} (ref 1–2)
ALP LIVER SERPL-CCNC: 80 U/L
ALT SERPL-CCNC: <7 U/L
ANION GAP SERPL CALC-SCNC: 6 MMOL/L (ref 0–18)
AST SERPL-CCNC: 15 U/L (ref ?–34)
BASOPHILS # BLD AUTO: 0.08 X10(3) UL (ref 0–0.2)
BASOPHILS NFR BLD AUTO: 1.2 %
BILIRUB SERPL-MCNC: 0.3 MG/DL (ref 0.2–1.1)
BUN BLD-MCNC: 11 MG/DL (ref 9–23)
BUN/CREAT SERPL: 10.2 (ref 10–20)
CALCIUM BLD-MCNC: 8.9 MG/DL (ref 8.7–10.4)
CHLORIDE SERPL-SCNC: 107 MMOL/L (ref 98–112)
CO2 SERPL-SCNC: 29 MMOL/L (ref 21–32)
CREAT BLD-MCNC: 1.08 MG/DL
DEPRECATED RDW RBC AUTO: 48.5 FL (ref 35.1–46.3)
EGFRCR SERPLBLD CKD-EPI 2021: 50 ML/MIN/1.73M2 (ref 60–?)
EOSINOPHIL # BLD AUTO: 0.21 X10(3) UL (ref 0–0.7)
EOSINOPHIL NFR BLD AUTO: 3.1 %
ERYTHROCYTE [DISTWIDTH] IN BLOOD BY AUTOMATED COUNT: 13.8 % (ref 11–15)
GLOBULIN PLAS-MCNC: 2.6 G/DL (ref 2.8–4.4)
GLUCOSE BLD-MCNC: 110 MG/DL (ref 70–99)
HCT VFR BLD AUTO: 42.9 %
HGB BLD-MCNC: 13.8 G/DL
IMM GRANULOCYTES # BLD AUTO: 0.03 X10(3) UL (ref 0–1)
IMM GRANULOCYTES NFR BLD: 0.4 %
LYMPHOCYTES # BLD AUTO: 1.74 X10(3) UL (ref 1–4)
LYMPHOCYTES NFR BLD AUTO: 25.8 %
MAGNESIUM SERPL-MCNC: 2 MG/DL (ref 1.6–2.6)
MCH RBC QN AUTO: 30 PG (ref 26–34)
MCHC RBC AUTO-ENTMCNC: 32.2 G/DL (ref 31–37)
MCV RBC AUTO: 93.3 FL
MONOCYTES # BLD AUTO: 0.56 X10(3) UL (ref 0.1–1)
MONOCYTES NFR BLD AUTO: 8.3 %
NEUTROPHILS # BLD AUTO: 4.12 X10 (3) UL (ref 1.5–7.7)
NEUTROPHILS # BLD AUTO: 4.12 X10(3) UL (ref 1.5–7.7)
NEUTROPHILS NFR BLD AUTO: 61.2 %
OSMOLALITY SERPL CALC.SUM OF ELEC: 294 MOSM/KG (ref 275–295)
PLATELET # BLD AUTO: 224 10(3)UL (ref 150–450)
POTASSIUM SERPL-SCNC: 4.1 MMOL/L (ref 3.5–5.1)
PROT SERPL-MCNC: 6.4 G/DL (ref 5.7–8.2)
RBC # BLD AUTO: 4.6 X10(6)UL
SODIUM SERPL-SCNC: 142 MMOL/L (ref 136–145)
TROPONIN I SERPL HS-MCNC: 11 NG/L
WBC # BLD AUTO: 6.7 X10(3) UL (ref 4–11)

## 2024-02-18 PROCEDURE — 99222 1ST HOSP IP/OBS MODERATE 55: CPT | Performed by: INTERNAL MEDICINE

## 2024-02-18 PROCEDURE — 71045 X-RAY EXAM CHEST 1 VIEW: CPT | Performed by: EMERGENCY MEDICINE

## 2024-02-19 ENCOUNTER — APPOINTMENT (OUTPATIENT)
Dept: CV DIAGNOSTICS | Facility: HOSPITAL | Age: 89
End: 2024-02-19
Attending: INTERNAL MEDICINE
Payer: MEDICARE

## 2024-02-19 ENCOUNTER — APPOINTMENT (OUTPATIENT)
Dept: INTERVENTIONAL RADIOLOGY/VASCULAR | Facility: HOSPITAL | Age: 89
End: 2024-02-19
Attending: INTERNAL MEDICINE
Payer: MEDICARE

## 2024-02-19 PROBLEM — I48.91 ATRIAL FIBRILLATION WITH NORMAL VENTRICULAR RATE (HCC): Status: ACTIVE | Noted: 2024-02-19

## 2024-02-19 PROBLEM — I48.91 ATRIAL FIBRILLATION (HCC): Status: ACTIVE | Noted: 2024-02-19

## 2024-02-19 LAB
Q-T INTERVAL: 370 MS
QRS DURATION: 128 MS
QTC CALCULATION (BEZET): 520 MS
R AXIS: -35 DEGREES
T AXIS: 114 DEGREES
T4 FREE SERPL-MCNC: 1.2 NG/DL (ref 0.8–1.7)
TROPONIN I SERPL HS-MCNC: 11 NG/L
TROPONIN I SERPL HS-MCNC: 11 NG/L
TSI SER-ACNC: 7.17 MIU/ML (ref 0.55–4.78)
VENTRICULAR RATE: 119 BPM

## 2024-02-19 PROCEDURE — 99233 SBSQ HOSP IP/OBS HIGH 50: CPT | Performed by: HOSPITALIST

## 2024-02-19 PROCEDURE — 93306 TTE W/DOPPLER COMPLETE: CPT | Performed by: INTERNAL MEDICINE

## 2024-02-19 PROCEDURE — 5A2204Z RESTORATION OF CARDIAC RHYTHM, SINGLE: ICD-10-PCS | Performed by: INTERNAL MEDICINE

## 2024-02-19 RX ORDER — ONDANSETRON 2 MG/ML
4 INJECTION INTRAMUSCULAR; INTRAVENOUS EVERY 6 HOURS PRN
Status: DISCONTINUED | OUTPATIENT
Start: 2024-02-19 | End: 2024-02-20

## 2024-02-19 RX ORDER — MIDODRINE HYDROCHLORIDE 5 MG/1
5 TABLET ORAL 2 TIMES DAILY PRN
Status: DISCONTINUED | OUTPATIENT
Start: 2024-02-19 | End: 2024-02-20

## 2024-02-19 RX ORDER — METHOHEXITAL IN WATER/PF 100MG/10ML
SYRINGE (ML) INTRAVENOUS
Status: COMPLETED
Start: 2024-02-19 | End: 2024-02-19

## 2024-02-19 RX ORDER — LATANOPROST 50 UG/ML
1 SOLUTION/ DROPS OPHTHALMIC NIGHTLY
Status: DISCONTINUED | OUTPATIENT
Start: 2024-02-19 | End: 2024-02-20

## 2024-02-19 RX ORDER — METOPROLOL TARTRATE 1 MG/ML
5 INJECTION, SOLUTION INTRAVENOUS ONCE
Status: COMPLETED | OUTPATIENT
Start: 2024-02-19 | End: 2024-02-19

## 2024-02-19 RX ORDER — METHOHEXITAL IN WATER/PF 100MG/10ML
60 SYRINGE (ML) INTRAVENOUS ONCE
Status: COMPLETED | OUTPATIENT
Start: 2024-02-19 | End: 2024-02-19

## 2024-02-19 RX ORDER — ACETAMINOPHEN 500 MG
500 TABLET ORAL EVERY 4 HOURS PRN
Status: DISCONTINUED | OUTPATIENT
Start: 2024-02-19 | End: 2024-02-20

## 2024-02-19 RX ORDER — DILTIAZEM HYDROCHLORIDE 5 MG/ML
10 INJECTION INTRAVENOUS ONCE
Status: DISCONTINUED | OUTPATIENT
Start: 2024-02-19 | End: 2024-02-20

## 2024-02-19 RX ORDER — METOCLOPRAMIDE HYDROCHLORIDE 5 MG/ML
10 INJECTION INTRAMUSCULAR; INTRAVENOUS EVERY 8 HOURS PRN
Status: DISCONTINUED | OUTPATIENT
Start: 2024-02-19 | End: 2024-02-20

## 2024-02-19 RX ORDER — METOPROLOL TARTRATE 1 MG/ML
INJECTION, SOLUTION INTRAVENOUS
Status: COMPLETED
Start: 2024-02-19 | End: 2024-02-19

## 2024-02-19 RX ORDER — SODIUM CHLORIDE 9 MG/ML
INJECTION, SOLUTION INTRAVENOUS CONTINUOUS
Status: DISCONTINUED | OUTPATIENT
Start: 2024-02-19 | End: 2024-02-19

## 2024-02-19 RX ORDER — MAGNESIUM OXIDE 400 MG (241.3 MG MAGNESIUM) TABLET
3 TABLET NIGHTLY PRN
Status: DISCONTINUED | OUTPATIENT
Start: 2024-02-19 | End: 2024-02-20

## 2024-02-19 NOTE — PLAN OF CARE
Problem: Patient Centered Care  Goal: Patient preferences are identified and integrated in the patient's plan of care  Description: Interventions:  - What would you like us to know as we care for you?   - Provide timely, complete, and accurate information to patient/family  - Incorporate patient and family knowledge, values, beliefs, and cultural backgrounds into the planning and delivery of care  - Encourage patient/family to participate in care and decision-making at the level they choose  - Honor patient and family perspectives and choices  Outcome: Progressing     Problem: Patient/Family Goals  Goal: Patient/Family Long Term Goal  Description: Patient's Long Term Goal:     Interventions:  -   - See additional Care Plan goals for specific interventions  Outcome: Progressing  Goal: Patient/Family Short Term Goal  Description: Patient's Short Term Goal:     Interventions:   -   - See additional Care Plan goals for specific interventions  Outcome: Progressing     Problem: PAIN - ADULT  Goal: Verbalizes/displays adequate comfort level or patient's stated pain goal  Description: INTERVENTIONS:  - Encourage pt to monitor pain and request assistance  - Assess pain using appropriate pain scale  - Administer analgesics based on type and severity of pain and evaluate response  - Implement non-pharmacological measures as appropriate and evaluate response  - Consider cultural and social influences on pain and pain management  - Manage/alleviate anxiety  - Utilize distraction and/or relaxation techniques  - Monitor for opioid side effects  - Notify MD/LIP if interventions unsuccessful or patient reports new pain  - Anticipate increased pain with activity and pre-medicate as appropriate  Outcome: Progressing     Problem: SAFETY ADULT - FALL  Goal: Free from fall injury  Description: INTERVENTIONS:  - Assess pt frequently for physical needs  - Identify cognitive and physical deficits and behaviors that affect risk of  falls.  - [place] fall precautions as indicated by assessment.  - Educate pt/family on patient safety including physical limitations  - Instruct pt to call for assistance with activity based on assessment  - Modify environment to reduce risk of injury  - Provide assistive devices as appropriate  - Consider OT/PT consult to assist with strengthening/mobility  - Encourage toileting schedule  Outcome: Progressing     Problem: DISCHARGE PLANNING  Goal: Discharge to home or other facility with appropriate resources  Description: INTERVENTIONS:  - Identify barriers to discharge w/pt and caregiver  - Include patient/family/discharge partner in discharge planning  - Arrange for needed discharge resources and transportation as appropriate  - Identify discharge learning needs (meds, wound care, etc)  - Arrange for interpreters to assist at discharge as needed  - Consider post-discharge preferences of patient/family/discharge partner  - Complete POLST form as appropriate  - Assess patient's ability to be responsible for managing their own health  - Refer to Case Management Department for coordinating discharge planning if the patient needs post-hospital services based on physician/LIP order or complex needs related to functional status, cognitive ability or social support system  Outcome: Progressing     Problem: CARDIOVASCULAR - ADULT  Goal: Maintains optimal cardiac output and hemodynamic stability  Description: INTERVENTIONS:  - Monitor vital signs, rhythm, and trends  - Monitor for bleeding, hypotension and signs of decreased cardiac output  - Evaluate effectiveness of vasoactive medications to optimize hemodynamic stability  - Monitor arterial and/or venous puncture sites for bleeding and/or hematoma  - Assess quality of pulses, skin color and temperature  - Assess for signs of decreased coronary artery perfusion - ex. Angina  - Evaluate fluid balance, assess for edema, trend weights  Outcome: Progressing  Goal: Absence  of cardiac arrhythmias or at baseline  Description: INTERVENTIONS:  - Continuous cardiac monitoring, monitor vital signs, obtain 12 lead EKG if indicated  - Evaluate effectiveness of antiarrhythmic and heart rate control medications as ordered  - Initiate emergency measures for life threatening arrhythmias  - Monitor electrolytes and administer replacement therapy as ordered  Outcome: Progressing     Problem: METABOLIC/FLUID AND ELECTROLYTES - ADULT  Goal: Glucose maintained within prescribed range  Description: INTERVENTIONS:  - Monitor Blood Glucose as ordered  - Assess for signs and symptoms of hyperglycemia and hypoglycemia  - Administer ordered medications to maintain glucose within target range  - Assess barriers to adequate nutritional intake and initiate nutrition consult as needed  - Instruct patient on self management of diabetes  Outcome: Progressing  Goal: Electrolytes maintained within normal limits  Description: INTERVENTIONS:  - Monitor labs and rhythm and assess patient for signs and symptoms of electrolyte imbalances  - Administer electrolyte replacement as ordered  - Monitor response to electrolyte replacements, including rhythm and repeat lab results as appropriate  - Fluid restriction as ordered  - Instruct patient on fluid and nutrition restrictions as appropriate  Outcome: Progressing  Goal: Hemodynamic stability and optimal renal function maintained  Description: INTERVENTIONS:  - Monitor labs and assess for signs and symptoms of volume excess or deficit  - Monitor intake, output and patient weight  - Monitor urine specific gravity, serum osmolarity and serum sodium as indicated or ordered  - Monitor response to interventions for patient's volume status, including labs, urine output, blood pressure (other measures as available)  - Encourage oral intake as appropriate  - Instruct patient on fluid and nutrition restrictions as appropriate  Outcome: Progressing

## 2024-02-19 NOTE — ED PROVIDER NOTES
Patient Seen in: [facility] Emergency Department    History     Chief Complaint   Patient presents with    Arrythmia/Palpitations       HPI    88-year-old female presents ER with complaint of nausea and shortness of breath.  Patient with a past medical history of atrial fibrillation.  Patient had recent ablation on 12/12/2023.  Patient noted that for the past 4 days, her heart rate been fast.  Patient found to be in A-fib with RVR on arrival to the ER.  Patient denies any chest pain.    History reviewed.   Past Medical History:   Diagnosis Date    Acute systolic CHF (congestive heart failure) ([facility]) 12/2017    Cardiac cath 1-18 with no obstructive CAD; Echo 1-18 with EF 35-40% ;Lexiscan GXT 6-22 with mild LVE, normal perfusion, EF 62%    Aortic atherosclerosis ([facility])     CXR 12-17    BCC (basal cell carcinoma) 2021    right tip of nose    Chronic kidney disease, stage 3 ([facility])     Glaucoma 2012 2012- START Latanoprost qhs OS, based on OCT    Herpes zoster 09/2010    Right leg    Kidney stones 06/2001    Treated with lithotripsy    Left bundle branch block     Left leg DVT ([facility]) 10/2021    Partially occlusive thrombus left proximal femoral vein    LV dysfunction     Echo 1-18 with EF 35-40%; cardiac cath 1-18 with EF 40% and no obstructive CAD; Echo 7-20 with EF 40-45%, mild MR, mild TR    Mitral regurgitation     Echo 1-18 with moderate-severe MR and TR; JAI 1-18 with moderate-severe MR    Orthostatic hypotension     Osteopenia 09/2011    T score -1.3 hip and -0.7 lumbar spine    Paroxysmal atrial fibrillation ([facility]) 03/2000    Nuclear treadmill stress test 7-15 normal with EF 57%; symptomatic recurrence 1-18 s/p cardioversion    Pulmonary hypertension ([facility])     Echo 1-18 with peak pulmonary artery pressure 44 mmHg       History reviewed.   Past Surgical History:   Procedure Laterality Date    APPENDECTOMY  July 2000    Ruptured appendicitis    CARDIOVERSION  01/2018    Symptomatic atrial fibrillation     CATARACT EXTRACTION W/  INTRAOCULAR LENS IMPLANT Right 4/13/17    Dr. Rogel @ [facility]    CATARACT EXTRACTION W/  INTRAOCULAR LENS IMPLANT Left 06/2017    Dr. Rogel     LITHOTRIPSY Left June 2001    OTHER  July 2011    ORIF distal right radius fracture    TUBAL LIGATION  1976         Medications :  (Not in a hospital admission)       Family History   Problem Relation Age of Onset    Diabetes Father     Other (Pancreatic Cancer) Father     Other (Renal Cell Carcinoma) Brother     Crohn's Disease Brother     Glaucoma Mother     Glaucoma Paternal Aunt     Macular degeneration Paternal Aunt        Smoking Status:   Social History     Socioeconomic History    Marital status:    Tobacco Use    Smoking status: Never    Smokeless tobacco: Never   Vaping Use    Vaping Use: Never used   Substance and Sexual Activity    Alcohol use: Not Currently     Alcohol/week: 0.8 standard drinks of alcohol     Types: 1 Glasses of wine per week    Drug use: No   Other Topics Concern    Caffeine Concern Yes     Comment: 24oz soda daily    Pt has a pacemaker No    Pt has a defibrillator No    Reaction to local anesthetic No       ROS  All pertinent positives for the review of systems are mentioned in the HPI  All other organ systems are reviewed and are negative.    Constitutional and vital signs reviewed.      Social History and Family History elements reviewed from today, pertinent positives to the presenting problem noted.    Physical Exam     ED Triage Vitals   BP 02/18/24 2230 (!) 146/94   Pulse 02/18/24 2230 103   Resp 02/18/24 2230 20   Temp 02/18/24 2232 97.9 °F (36.6 °C)   Temp src 02/18/24 2232 Oral   SpO2 02/18/24 2230 95 %   O2 Device 02/19/24 0000 None (Room air)       All measures to prevent infection transmission during my interaction with the patient were taken. The patient was already wearing a droplet mask on my arrival to the room. Personal protective equipment including droplet mask, eye protection, and gloves were  worn throughout the duration of the exam.  Handwashing was performed prior to and after the exam.  Stethoscope and any equipment used during my examination was cleaned with super sani-cloth germicidal wipes following the exam.     Physical Exam  Vitals and nursing note reviewed.   Constitutional:       Appearance: She is well-developed.   HENT:      Head: Normocephalic and atraumatic.      Right Ear: External ear normal.      Left Ear: External ear normal.      Nose: Nose normal.   Eyes:      Conjunctiva/sclera: Conjunctivae normal.      Pupils: Pupils are equal, round, and reactive to light.   Cardiovascular:      Rate and Rhythm: Normal rate. Rhythm irregular.      Heart sounds: Normal heart sounds.   Pulmonary:      Effort: Pulmonary effort is normal.      Breath sounds: Normal breath sounds.   Abdominal:      General: Bowel sounds are normal.      Palpations: Abdomen is soft.   Musculoskeletal:         General: Normal range of motion.      Cervical back: Normal range of motion and neck supple.   Skin:     General: Skin is warm and dry.   Neurological:      General: No focal deficit present.      Mental Status: She is alert and oriented to person, place, and time.      Deep Tendon Reflexes: Reflexes are normal and symmetric.   Psychiatric:         Behavior: Behavior normal.         Thought Content: Thought content normal.         Judgment: Judgment normal.         ED Course        Labs Reviewed   COMP METABOLIC PANEL (14) - Abnormal; Notable for the following components:       Result Value    Glucose 110 (*)     Creatinine 1.08 (*)     eGFR-Cr 50 (*)     ALT <7 (*)     Globulin  2.6 (*)     All other components within normal limits   CBC W/ DIFFERENTIAL - Abnormal; Notable for the following components:    RDW-SD 48.5 (*)     All other components within normal limits   TROPONIN I HIGH SENSITIVITY - Normal   MAGNESIUM - Normal   CBC WITH DIFFERENTIAL WITH PLATELET    Narrative:     The following orders were created  for panel order CBC With Differential With Platelet.                  Procedure                               Abnormality         Status                                     ---------                               -----------         ------                                     CBC W/ DIFFERENTIAL[[id number]]          Abnormal            Final result                                                 Please view results for these tests on the individual orders.   RAINBOW DRAW LAVENDER   RAINBOW DRAW LIGHT GREEN   RAINBOW DRAW BLUE     EKG    Rate, intervals and axes as noted on EKG Report.  Rate: 119  Rhythm: Atrial Fibrillation  Reading: No ST deviation, normal axis.             Imaging Results Available and Reviewed while in ED: No results found.  ED Medications Administered: Medications - No data to display      MDM     Vitals:    02/18/24 2230 02/18/24 2232 02/19/24 0000   BP: (!) 146/94  136/89   Pulse: 103  89   Resp: 20  15   Temp:  97.9 °F (36.6 °C)    TempSrc:  Oral    SpO2: 95%  97%   Weight: 79.4 kg     Height: 180.3 cm (5' 11\")       *I personally reviewed and interpreted all ED vitals.  I also personally reviewed all labs and imaging if ordered    Pulse Ox: 95%, Room air, Normal     Monitor Interpretation:   atrial fibrillation, with ventricular rate of 98    Differential Diagnosis/ Diagnostic Considerations: Paroxysmal A-fib, dehydration, electrolyte abnormality.    Medical Record Review: I personally reviewed available prior medical records for any recent pertinent discharge summaries, testing, and procedures and reviewed those reports.    Complicating Factors: The patient already has does not have any pertinent problems on file. to contribute to the complexity of this ED evaluation.    Medical Decision Making  88-year-old female presents ER with complaint of nausea and shortness of breath.  Patient found to be in A-fib with RVR.  Upon arrival to the examination room, patient was still in persistent A-fib  with a normal rate.  Discussed with [facility] since patient had recent ablation in December 2023 and states the patient should be admitted for cardiac evaluation.  Hospitalist notified of the admission.  Patient's daughter bedside made aware of the disposition admission.    Problems Addressed:  Atrial fibrillation with normal ventricular rate (HCC): acute illness or injury    Amount and/or Complexity of Data Reviewed  Independent Historian:      Details: Patient's daughter states she been feeling short of breath for the past 4 days.  Patient also complained of nausea which she normally complains of when she is in atrial fibrillation  External Data Reviewed: ECG and notes.     Details: Outpatient notes reviewed.  Patient was admitted on 12/12/2023 for cardiac ablation which was successful  Labs: ordered. Decision-making details documented in ED Course.  Radiology: ordered and independent interpretation performed. Decision-making details documented in ED Course.     Details: Chest x-ray inter by myself shows no acute cardiopulmonary issues.        Condition upon leaving the department: Stable    Disposition and Plan     Clinical Impression:  1. Atrial fibrillation with normal ventricular rate (HCC)        Disposition:  Admit    Follow-up:  No follow-up provider specified.    Medications Prescribed:  Current Discharge Medication List          Hospital Problems       Present on Admission  Date Reviewed: 2/18/2024            ICD-10-CM Noted POA    * (Principal) Atrial fibrillation with normal ventricular rate (HCC) I48.91 2/19/2024 Unknown    Other chest pain R07.89 12/31/2017 Yes    Paroxysmal atrial fibrillation (HCC) I48.0 8/16/2014 Yes

## 2024-02-19 NOTE — ED QUICK NOTES
Orders for admission, patient is aware of plan and ready to go upstairs. Any questions, please call ED RN Mic at extension [phone].     Patient Covid vaccination status: Fully vaccinated     COVID Test Ordered in ED: None    COVID Suspicion at Admission: N/A    Running Infusions:  None    Mental Status/LOC at time of transport: ao4    Other pertinent information:   CIWA score: N/A   NIH score:  N/A

## 2024-02-19 NOTE — CONSULTS
Cardiology (consult dictated)    Assessment:  1.  History of atrial fibrillation, status post cardioversion December, 2021, and December, 2023.  Not on rhythm control medication.    2.  Symptoms of nausea and shortness of breath which have been markers for A-fib in the past, for the last 4 days.  Appears to be in persistent atrial fibrillation.  Rate is controlled.    3.  Normal coronary arteries at cath 2018.  Last echo in 2022 showed normal systolic function with an EF of 58% and mild MR.      Plan:  1.  Echo ASAP this morning.    2.  If nothing unexpected on the echo, we will proceed with cardioversion this afternoon.  Keep n.p.o. for now.      Thank you.

## 2024-02-19 NOTE — PROCEDURES
PROCEDURE NOTE - CARDIOVERSION    Preop: Atrial fibrillation  Postop: Atrial fibrillation  Procedure: DC cardioversion    The patient was brought to the Cath Lab in the fasting state.  Informed consent was obtained after describing in detail the procedure and the indications.  Alternative approaches were also discussed.  Symptomatically, she has been in atrial fibrillation for 5 days.  She has been on Xarelto continuously for several months without missing any doses.    The patient was taken to the Cath Lab procedure room and monitored in the usual fashion.  A timeout was performed.  A total of 60 mg of IV Brevital was given to achieve adequate sedation.  A single synchronized shock was delivered at 200 J but the patient remained in atrial fibrillation.  A second synchronized shock was delivered at 200 J and sinus rhythm was restored with frequent PACs and runs of PAT.  5 mg of IV Lopressor was then administered and the rhythm stabilized nicely being sinus rhythm to sinus bradycardia with occasional PACs.    The patient awoke uneventfully with no complications noted.    Sedation: Brevital sedation, monitored for 15 minutes.  Specimen: None  EBL: 0 mL  Complications none  Condition: Stable

## 2024-02-19 NOTE — IVS NOTE
Hand-Off   Procedure hand off report given to Samuel CATHERINE.   Pt's vital signs are stable.   Pt denies any pain or discomfort  12 lead EKG completed  Post cardioversion orders reviewed    Dr. Wilson spoke with patient/family post procedure.     Pt transferred with tele box in place.

## 2024-02-19 NOTE — PLAN OF CARE
Rachelle Hunter Patient Status:  Inpatient    1935 MRN [id number]   Location [facility] 1W Attending Sherice Aguilar MD   Hosp Day # 0 PCP Stoney Darnell MD       Cardiology Nocturnal APN Note    Page Received: Dr. Chin, ED Physician    HPI:     Patient is a 88 year old female with PMH of aroxysmal atrial fibrillation s/psuccessful cardioversion (2023) on Xarelto, DVT, HFrEF (last echocardiogram in  EF 40-45%, grade I diastolic dysfunction)-, orthostatic hypotension, mitral regurgitation, aortic atherosclerosis, pulmonary hypertension and CKD stage III who presented to the ED with complaint of nausea and dyspnea.  Patient also reported having fast heart rate for the last few days.  On arrival to ED, patient was noted to be in atrial fibrillation with rapid ventricular response with a rate in the 110s.  [facility] consulted for symptomatic atrial fibrillation.  HPI obtained from chart review and information provided by ED physician        ED Clinical Course    EKG: As noted above in HPI    Labs: Cr 1.08, troponin negative    Imaging: Per ED physician, CXR negative for acute findings    Medications: N/A          Assessment/Plan:    - Continue Xarelto  - Continue to monitor overnight on telemetry  - Formal cardiology consult to follow in AM.         LE Key  [facility] [place]  2024  2:18 AM

## 2024-02-19 NOTE — H&P
History and Physical     Rachelle Hunter Patient Status:  Emergency    1935 MRN [id number]   Location [facility] EMERGENCY DEPARTMENT Attending Erick Hay, DO   Hosp Day # 0 PCP Stoney Darnell MD       Chief Complaint: Chest pain, shortness of breath    Subjective:    Rachelle Hunter is a 88 year old female with history of CHF, A-fib s/p ablation in , LBBB, CKD 3, DVT who presented to the ED with complaints of intermittent chest pain, shortness of breath and nausea for the last 3 to 4 days.  Symptoms worsened today, prompting ER visit.  At presentation, noted with mild tachycardia 103.  Vitals otherwise stable.  Labs stable.  EKG: A-fib with RVR, LAD, LBBB.  CXR with no acute process     Due to being symptomatic, cardiology consulted, recommended admission.  Currently not on any antiarrhythmics due to nausea and hypotension.  Currently takes midodrine and Xarelto only.    History/Other:      Past Medical History:  Past Medical History:   Diagnosis Date    Acute systolic CHF (congestive heart failure) (HCC) 2017    Cardiac cath  with no obstructive CAD; Echo  with EF 35-40% ;Lexiscan GXT  with mild LVE, normal perfusion, EF 62%    Aortic atherosclerosis (HCC)     CXR     BCC (basal cell carcinoma)     right tip of nose    Chronic kidney disease, stage 3 (HCC)     Glaucoma 2012- START Latanoprost qhs OS, based on OCT    Herpes zoster 2010    Right leg    Kidney stones 2001    Treated with lithotripsy    Left bundle branch block     Left leg DVT ([facility]) 10/2021    Partially occlusive thrombus left proximal femoral vein    LV dysfunction     Echo - with EF 35-40%; cardiac cath  with EF 40% and no obstructive CAD; Echo - with EF 40-45%, mild MR, mild TR    Mitral regurgitation     Echo - with moderate-severe MR and TR; JAI - with moderate-severe MR    Orthostatic hypotension     Osteopenia 2011    T score -1.3 hip and -0.7 lumbar spine     Paroxysmal atrial fibrillation (HCC) 03/2000    Nuclear treadmill stress test 7-15 normal with EF 57%; symptomatic recurrence 1-18 s/p cardioversion    Pulmonary hypertension (HCC)     Echo 1-18 with peak pulmonary artery pressure 44 mmHg        Past Surgical History:   Past Surgical History:   Procedure Laterality Date    APPENDECTOMY  July 2000    Ruptured appendicitis    CARDIOVERSION  01/2018    Symptomatic atrial fibrillation    CATARACT EXTRACTION W/  INTRAOCULAR LENS IMPLANT Right 4/13/17    Dr. Rogel @ [facility]    CATARACT EXTRACTION W/  INTRAOCULAR LENS IMPLANT Left 06/2017    Dr. Rogel     LITHOTRIPSY Left June 2001    OTHER  July 2011    ORIF distal right radius fracture    TUBAL LIGATION  1976       Social History:  reports that she has never smoked. She has never used smokeless tobacco. She reports that she does not currently use alcohol after a past usage of about 0.8 standard drinks of alcohol per week. She reports that she does not use drugs.    Family History:   Family History   Problem Relation Age of Onset    Diabetes Father     Other (Pancreatic Cancer) Father     Other (Renal Cell Carcinoma) Brother     Crohn's Disease Brother     Glaucoma Mother     Glaucoma Paternal Aunt     Macular degeneration Paternal Aunt        Allergies:   Allergies   Allergen Reactions    Sulfa Antibiotics HIVES       Medications:    Current Facility-Administered Medications on File Prior to Encounter   Medication Dose Route Frequency Provider Last Rate Last Admin    [COMPLETED] methohexital (BrevITAL) 100 mg/10mL IV syringe 50 mg  50 mg Intravenous Once James Rahman MD   50 mg at 12/12/23 1644     Current Outpatient Medications on File Prior to Encounter   Medication Sig Dispense Refill    clotrimazole-betamethasone 1-0.05 % External Cream Apply thin layer to AA BID x 7 days 45 g 0    estradiol (ESTRACE) 0.1 MG/GM Vaginal Cream Apply 1/2 gram vaginally 2-3 times per week. 42 g 3    midodrine 5 MG Oral Tab Take 1  tablet (5 mg total) by mouth 2 (two) times daily before meals. 180 tablet 1    Vitamin C 500 MG Oral Tab Take 1 tablet (500 mg total) by mouth daily. (Patient not taking: Reported on 11/14/2023)      clobetasol 0.05 % External Ointment Apply 1 Application topically 2 (two) times daily. Twice daily for one month, then daily for one month, than maintenance twice weekly 60 g 3    latanoprost 0.005 % Ophthalmic Solution INSTILL 1 DROP IN BOTH EYES AT BEDTIME 7.5 mL 3    ondansetron 4 MG Oral Tablet Dispersible Take 1 tablet (4 mg total) by mouth every 8 (eight) hours as needed for Nausea. (Patient not taking: Reported on 12/15/2023) 20 tablet 1    XARELTO 20 MG Oral Tab Take 1 tablet (20 mg total) by mouth daily with food.      acetaminophen 500 MG Oral Tab Take 1 tablet (500 mg total) by mouth as needed for Pain.      Calcium Carb-Cholecalciferol (CALCIUM CARBONATE-VITAMIN D3 OR) Take 2 tablets by mouth daily. (Patient not taking: Reported on 12/15/2023)      Multiple Vitamin (MULTIVITAMINS) Oral Cap Take 1 capsule by mouth daily. (Patient not taking: Reported on 12/15/2023)         Review of Systems:   A comprehensive 14 point review of systems was completed.    Pertinent positives and negatives noted in the HPI.    Objective:     BP (!) 146/94   Pulse 103   Temp 97.9 °F (36.6 °C) (Oral)   Resp 20   Ht 5' 11\" (1.803 m)   Wt 175 lb (79.4 kg)   SpO2 95%   BMI 24.41 kg/m²   General: No acute distress.    HEENT: Normocephalic, atraumatic.  Neck:  supple.  Respiratory: Normal effort.  CTAB  Cardiovascular: S1, S2, irregular, mildly tachycardic.   Abdomen: Soft, not tender, not distended.  Neurologic:, Oriented x 4.  No gross focal deficits.  Ambulating in room.  Musculoskeletal: Tenderness to touch of BLE, mild swelling.  Extremities: Trace edema  Psychiatric: Appropriate    Results:      Labs:  Labs Last 24 Hours:   BMP     CBC    Other     Na 142 Cl 107 BUN 11 Glu 110   Hb 13.8   PTT - Procal -   K 4.1 CO2 29.0 Cr  1.08   WBC 6.7 >< .0  INR - CRP -   Renal Lytes Endo    Hct 42.9   Trop - D dim -   eGFR - Ca 8.9 POC Gluc  -    LFT   pBNP - Lactic -   eGFR AA - PO4 - A1c -   AST 15 APk 80 Prot 6.4  LDL -     Mg 2.0 TSH -   ALT <7 T galina 0.3 Alb 3.8          COVID-19 Lab Results    COVID-19  Lab Results   Component Value Date    COVID19 Not Detected 12/12/2021    COVID19 Not Detected 12/01/2021       Pro-Calcitonin  No results for input(s): \"PCT\" in the last 168 hours.    Cardiac  No results for input(s): \"TROP\", \"PBNP\" in the last 168 hours.    Creatinine Kinase  No results for input(s): \"CK\" in the last 168 hours.    Inflammatory Markers  No results for input(s): \"CRP\", \"SATURNINO\", \"LDH\", \"DDIMER\" in the last 168 hours.    Imaging: Imaging data reviewed in Epic.    Assessment & Plan:    # Paroxysmal atrial fibrillation s/p previous ablation  Chest pain due to above  -Observation  -Telemetry  -Continue Xarelto, midodrine  -Check thyroid function  -Cardiology consult  -No antiarrhythmics at this time due to hypotension and bradycardia history    CKD 3  -Creatinine at baseline    Quality:  DVT Prophylaxis: Xarelto  CODE status: Full code  NGOC: 1-2 days    Plan of care discussed with patient and daughter Angela at bedside. Acknowledged understanding and agrees to plan. Also discussed with the ED physician.    57 minutes spent on this admission - examining patient, obtaining history, reviewing previous medical records, going over test results/imaging and discussing plan of care. More than 50% of the time was spent in counseling and/or coordination of care related to PAF.   All questions answered.     Sherice Aguilar MD  2/18/2024

## 2024-02-19 NOTE — CONSULTS
[facility]    PATIENT'S NAME: MAY VAZQUEZ   ATTENDING PHYSICIAN: Gypsy Patrick MD   CONSULTING PHYSICIAN: Gino Wilson MD   PATIENT ACCOUNT#:   [id number]    LOCATION:   [address] [facility]  MEDICAL RECORD #:   [id number]       YOB: 1935  ADMISSION DATE:       02/18/2024      CONSULT DATE:  02/19/2024    REPORT OF CONSULTATION      HISTORY OF PRESENT ILLNESS:  The patient is a pleasant 88-year-old female with a history of atrial fibrillation who developed nausea, palpitations, and mild shortness of breath on Thursday, 4 days ago.  All of these have persisted.  She came to the emergency room last evening and was found to be in atrial fibrillation and was admitted.      PAST MEDICAL HISTORY:  Atrial fibrillation, status post cardioversion in 2021 and again in December 2023.  She has not been tolerant of amiodarone and had recurrence of the rhythm disturbance on Multaq.  Chronic hypotension, on midodrine.  History of transient LV dysfunction in the past.  Most recent echo gave normal systolic function in 2022.     MEDICATIONS:  Home medications:  Xarelto 20 mg daily, and she has been faithful in taking it, is not missing any doses.  Also on midodrine, uncertain dose, but probably 5 mg b.i.d.    ALLERGIES:  Sulfa.    SOCIAL HISTORY:  Nonsmoker, nondrinker.  No caffeine.  She is , has 3 children.  She is a retired  at [employer].    REVIEW OF SYSTEMS:  Otherwise negative.      PHYSICAL EXAMINATION:    GENERAL:  Well-developed, well-nourished female in no acute distress.  Alert and oriented x3.   VITAL SIGNS:  Blood pressure 138/90; respirations 18; pulse 112, irregular; saturation 97%.  HEENT:  Unremarkable.  NECK:  Supple.  Jugular venous pressure normal.  Carotids are brisk without bruits.  No thyromegaly.  LUNGS:  Clear bilaterally.  HEART:  S1 and S2 are normal.  There is no gallop, murmur, rub, or click.  ABDOMEN:  Benign.  EXTREMITIES:  Warm and dry.   Good pulses.  No edema.    LABORATORY DATA:  Basic chemistries were unremarkable.  GFR is 50.  Troponin normal x3.  TSH 7.16.  CBC and differential normal.    EKG shows atrial fibrillation with a left bundle branch block.    ASSESSMENT:    1.   History of atrial fibrillation, status post cardioversion December 2021 and again in December 2023.  Not on rhythm control medication due to intolerance.  2.   Symptoms of nausea and shortness of breath, which are her symptoms for atrial fibrillation; present for the last 4 days.  Appears to be persistent atrial fibrillation.  Rate is controlled at this time.    3.   Normal coronary arteries at catheterization in 2018.  Last echo in 2022 showed normal systolic function, although a previous echo had showed mild LV dysfunction with an EF of 40% to 45%.    PLAN:    1.   Echocardiogram this morning.   2.   If nothing unexpected on the echo, will recommend cardioversion this afternoon.  Keep patient n.p.o. for now.    Thank you for this consultation.    Dictated By Gion Wilson MD  d: 02/19/2024 09:23:44  t: 02/19/2024 10:02:21  [facility] [id number]  [facility] [place]

## 2024-02-19 NOTE — HISTORICAL OFFICE NOTE
[facility]  Outside Information  Continuity of Care Document  12/12/2023  Rachelle Hunter - 88 y.o. Female; born [birth year]Summary of episode note, generated on Feb. 18, 2024February 18, 2024   CHIEF COMPLAINT    CHIEF COMPLAINT  Reason for Visit/Chief Complaint   Follow UP   Patient is here for an urgent appointment. She has been having significant symptoms of nausea dizziness or lightheadedness for the last few days. She has a history of paroxysmal atrial fibrillation and DVT on chronic anticoagulation. Her EKG in my office today showed atrial fibrillation/one-to-one flutter/SVT with rate related left bundle.     PROBLEMS  Reconcile with Patient's ChartPROBLEMS  Problem Effective Dates Date resolved Problem Status   Varicose veins of both lower extremities with pain, [SNOMED-CT: 93215474] 11/1/2022 - Active   Atrial fibrillation (Afib), paroxysmal - A Fib, [SNOMED-CT: 227653005] 7/15/2021 - Active   Palpitations, [SNOMED-CT: 13788403] 8/29/2019 - Active   LV dysfunction, [SNOMED-CT: 409446988] 8/29/2019 - Active   DVT Chronic (deep vein thrombosis) of left popliteal vein, [SNOMED-CT: 602238687512741] 11/9/2021 - Active   Edema, localized, [SNOMED-CT: 951994320] 7/15/2021 - Active   Chronic systolic CHF (congestive heart failure), [SNOMED-CT: 567893234] 8/29/2019 - Active   Aortic atherosclerosis, [SNOMED-CT: 86190876] 8/29/2019 - Active   Mitral regurgitation, [SNOMED-CT: 29941296] 8/29/2019 - Active   Left bundle branch block, [SNOMED-CT: 71833144] 8/29/2019 - Active   Pulmonary hypertension, not otherwise specified or secondary, [SNOMED-CT: 42753915] 9/23/2020 - Active     ENCOUNTER DIAGNOSIS    ENCOUNTER DIAGNOSIS  Problem Effective Dates Date resolved Problem Status   Orthostatic hypotension, [SNOMED-CT: 32051590] 3/17/2023 - Active   Atrial fibrillation (Afib), paroxysmal - A Fib, [SNOMED-CT: 223065399] 7/15/2021 - Active   Edema, localized, [OMED-CT: 225065635]  7/15/2021 - Active   Aortic atherosclerosis, [SNOMED-CT: 18502467] 8/29/2019 - Active   Mitral regurgitation, [SNOMED-CT: 31908881] 8/29/2019 - Active   Left bundle branch block, [SNOMED-CT: 41154441] 8/29/2019 - Active   Pulmonary hypertension, not otherwise specified or secondary, [SNOMED-CT: 18040267] 9/23/2020 - Active     VITAL SIGNS    VITAL SIGNS  Date / Time: 12/12/2023   BP Systolic 102 mmHg   BP Diastolic 60 mmHg   Height 71 inches   Weight 174 lbs   Pulse Rate 121 bpm   BSA (Body Surface Area) 2 cc/m2   BMI (Body Mass Index) 24.3 cc/m2   Blood Pressure 102 / 60 mmHg     PHYSICAL EXAMINATION    PHYSICAL EXAMINATION  Header Details   Constitutional 97% o2   Vitals Left Arm Sitting  / 60 mmHg, Pulse rate 121 bpm, Regular, Height in 5' 11\", BMI: 24.3, Weight in 174.17 lbs (or) 79 kgs, BSA : 1.99 cc/m²   Head/Eyes/Ears/Nose/Mouth/Throat EOMI, PERRLA   Neck No carotid bruits, No JVD   Respiratory Unlabored, Lungs clear with normal breath sounds, Equal bilaterally   Cardiovascular Regular rhythm. Normal and normal S1 and S2   Gastrointestinal Abdomen soft, Non-tender   Gait Normal gait   Upper Extremities No clubbing, No cyanosis, No edema   Lower Extremities Pulses 2+ and equal bilaterally   Skin Warm and dry     ALLERGIES, ADVERSE REACTIONS, ALERTS    ALLERGIES, ADVERSE REACTIONS, ALERTS  Type Substance Reaction Severity Status   Allergies Sulfa (Sulfonamide Antibiotics) - Allergen hives Severe Active     MEDICATIONS ADMINISTERED DURING VISIT    No data available    MEDICATIONS    MEDICATIONS  Medication Start Date Route/Frequency Status   acetaminophen (TYLENOL) 500 MG tablet, [RxNorm: 398420] 9/22/2021 Take 500 mg by mouth as needed. Active   cranberry 400 mg capsule, [RxNorm: 0] 4/4/2023 Take 1 capsule orally once a day. Active   latanoprost (XALATAN) 0.005 % ophthalmic solution, [RxNorm: 928629] 9/22/2021 Use 1 drop in both eyes at bedtime Active   midodrine 5 mg tablet, [RxNorm: 127357] 6/5/2023  Take 1 tablet orally 2 times a day. Active   Xarelto 20 mg tablet, [RxNorm: 9318588] 10/6/2023 Take 1 tablet orally once a day. Active     ASSESSMENT    Discussed with Dr. Lopez on the phone  With symptomatic atrial fibrillation/flutter would recommend cardioversion today  Patient has been on uninterrupted Xarelto for more than 2 months now  Will arrange for her to proceed to go to the cardiac recovery for cardioversion this afternoon  Follow-up with Dr. Lopez within 1 to 2 weeks  Follow-up with me in 6 months or sooner if needed     FAMILY HISTORY    FAMILY HISTORY  Relationship Age Diagnosis   Father 0 Atrial fibrillation (Afib), paroxysmal - A Fib     GENERAL STATUS    No data available    PAST MEDICAL HISTORY    PAST MEDICAL HISTORY  Problem Date diagonsed Date resolved Status   Varicose veins of both lower extremities with pain, [SNOMED-CT: 81413652] 11/1/2022 - Active   Atrial fibrillation (Afib), paroxysmal - A Fib, [SNOMED-CT: 173847885] 7/15/2021 - Active   Palpitations, [SNOMED-CT: 70452166] 8/29/2019 - Active   LV dysfunction, [SNOMED-CT: 298537877] 8/29/2019 - Active   DVT Chronic (deep vein thrombosis) of left popliteal vein, [SNOMED-CT: 212687342469046] 11/9/2021 - Active   Edema, localized, [SNOMED-CT: 651004283] 7/15/2021 - Active   Chronic systolic CHF (congestive heart failure), [SNOMED-CT: 365968570] 8/29/2019 - Active   Aortic atherosclerosis, [SNOMED-CT: 70164467] 8/29/2019 - Active   Mitral regurgitation, [SNOMED-CT: 35044942] 8/29/2019 - Active   Left bundle branch block, [SNOMED-CT: 06306622] 8/29/2019 - Active   Pulmonary hypertension, not otherwise specified or secondary, [SNOMED-CT: 23518351] 9/23/2020 - Active     HISTORY OF PRESENT ILLNESS    Patient is here for an urgent appointment. She has been having significant symptoms of nausea dizziness or lightheadedness for the last few days. She has a history of paroxysmal atrial fibrillation and DVT on chronic anticoagulation. Her EKG in my  office today showed atrial fibrillation/one-to-one flutter/SVT with rate related left bundle.     IMMUNIZATIONS    No data available    PLAN OF CARE    PLAN OF CARE  Planned Care Date   EKG (electrocardiogram) 1/1/1900   Referral Visit - Stoney Darnell [email or web address]) : 1/1/1900   Follow up visit - James Rahman MD 1/1/1900   Follow up visit - Miguel Angel Lopez MD 1/1/1900     PROCEDURES    PROCEDURES  NAME TYPE DATE   Cardioversion, elective, electrical conversion of arrhythmia; external [SNOMED CT: 093374115] Procedure 9/28/2021     RESULTS    RESULTS  Name Result Date Location - Ordered By   TSH [LOINC: 50676-2] 7.180 mIU/mL [High] 03/31/2023 07:53:00 AM [facility] ([facility])  Address: [address] [facility]  [address]  tel:   GLUCOSE [LOINC: 2339-0] 106 mg/dL [High] 03/31/2023 07:53:00 AM [facility] ([facility])  Address: [address] [facility]  [address]  tel:   SODIUM [LOINC: 2951-2] 141 mmol/L 03/31/2023 07:53:00 AM [facility] ([facility])  Address: [address]  [facility]  [address]  tel:   POTASSIUM [LOINC: 2823-3] 3.9 mmol/L 03/31/2023 07:53:00 AM [facility] ([facility])  Address: [address]  [facility]  [address]  tel:   CHLORIDE [LOINC: 2075-0] 106 mmol/L 03/31/2023 07:53:00 AM [facility] ([facility])  Address: [address]  [facility]  [address]  tel:   CO2 [LOINC: 2028-9] 31.0 mmol/L 03/31/2023 07:53:00 AM [facility] ([facility])  Address: [address]  [facility]  [address]  tel:   ANION GAP [LOINC: 33037-3] 4 mmol/L 03/31/2023 07:53:00 AM [facility] ([facility])  Address: [address]  [facility]  [address]  tel:   BUN [LOINC: 6299-2] 19 mg/dL [High] 03/31/2023 07:53:00 AM [facility] ([facility])  Address: [address]  [facility]  [address]  tel:    CREATININE [LOINC: 89748-8] 1.13 mg/dL [High] 03/31/2023 07:53:00 AM [facility] ([facility])  Address: [address]  [facility]  [address]  tel:   BUN/ CREAT RATIO [LOINC: 3097-3] 16.8 03/31/2023 07:53:00 AM [facility] ([facility])  Address: [address]  [facility]  [address]  tel:   CALCIUM [LOINC: 42581-6] 8.9 mg/dL 03/31/2023 07:53:00 AM [facility] ([facility])  Address: [address]  [facility]  [address]  tel:   OSMOLALITY CALCULATED [LOINC: 95515-3] 295 mOsm/kg 03/31/2023 07:53:00 AM [facility] ([facility])  Address: [address]  [facility]  [address]  tel:   E GFR CR [LOINC: 31841-6] 48 mL/min/1.73m2 [Low] 03/31/2023 07:53:00 AM [facility] ([facility])  Address: [address]  [facility]  [address]  tel:   ALT [LOINC: 1742-6] 37 U/L 03/31/2023 07:53:00 AM [facility] ([facility])  Address: [address]  [facility]  [address]  tel:   AST [LOINC: 1920-8] 30 U/L 03/31/2023 07:53:00 AM [facility] ([facility])  Address: [address]  [facility]  [address]  tel:   ALKALINE PHOSPHATASE [LOINC: 1779-8] 140 U/L 03/31/2023 07:53:00 AM [facility] ([facility])  Address: [address]  [facility]  [address]  tel:   BILIRUBIN, TOTAL [LOINC: 1975-2] 0.4 mg/dL 03/31/2023 07:53:00 AM [facility] ([facility])  Address: [address]  [facility]  [address]  tel:   TOTAL PROTEIN [LOINC: 2885-2] 7.0 g/dL 03/31/2023 07:53:00 AM [facility] ([facility])  Address: [address]  [facility]  [address]  tel:   ALBUMIN [LOINC: 1751-7] 3.0 g/dL [Low] 03/31/2023 07:53:00 AM [facility] ([facility])  Address: [address]  [facility]  [address]  tel:   GLOBULIN [LOINC: 50606-8] 4.0 g/dL 03/31/2023 07:53:00 AM [facility]  LAB ([facility])  Address: [address]  [facility]  [address]  tel:   A/G RATIO [LOINC: 1759-0] 0.8 [Low] 03/31/2023 07:53:00 AM [facility] ([facility])  Address: [address]  [facility]  [address]  tel:   FASTING PATIENT CMP ANSWER [LOINC: 17780-9] Yes 03/31/2023 07:53:00 AM [facility] ([facility])  Address: [address]  [facility]  [address]  tel:   FREE T4 [LOINC: 3024-7] 1.4 ng/dL 03/17/2023 09:55:00 AM [facility] ([facility])  Address: [address]  [facility]  [address]  tel:   TSH [LOINC: 28709-4] 5.460 mIU/mL [High] 03/17/2023 09:55:00 AM [facility] ([facility])  Address: [address] [facility]  [address]  tel:   Nuclear PET 1.Stress EKG is non-diagnostic due to LBBB.2.Sinus arrhythmia and PACs.3.No chest pain.4.Baseline HTN.1.This is a normal perfusion study, no perfusion defects noted. 2.The left ventricular cavity is noted to be mildly enlarged on the stress studies. The stress left ventricular ejection fraction was calculated to be 66% and left ventricular global function is normal. The rest left ventricular cavity is noted to be normal. The rest left ventricular ejection fraction was calculated to be 62% and rest left ventricular global function is normal. 3.This scan is suggestive of low risk for future cardiovascular events. 4.The study quality is average. 6/7/2022 10:30:00 AM Stoney Alaniz MD   Trans Thoracic Echocardiogram 1.The left ventricle is mildly enlarged. Mild left ventricular hypertrophy is noted. Global left ventricular systolic function is normal. The left ventricular ejection fraction is 58%. No significant wall motion abnormalities noted. Left ventricular diastolic function is normal. 2.The anterior mitral leaflet is mildly thickened. Mild (1+) mitral regurgitation.3.The pulmonary artery systolic pressure is 34 mmHg. 4/12/2022 12:30:00 PM Michelle  John DODGE   Lower Extremity Venous Ultrasound 1.The study quality is good. 2.Exam performed with the patient in reverse Trendelenburg position.3.Evidence of fibrin strands noted in the left proximal femoral vein suggestive of chronic thrombus, this is unchanged from the previous study.4.No evidence of deep vein thrombus in right lower extremity.5.Reflux is noted in the right & left GSV, refer to chart below.6.Reflux is noted in the left SSV mid calf = 0.7sec.7.Perforators visualized in right distal calf = 3mm and left mid calf = 2mm, but no insufficiency noted. 10/25/2022 9:00:00 AM Nicholas Case MD   Holter Monitoring 1.This is an excellent quality study. 2.Predominant rhythm is sinus bradycardia. 3.The minimum heart rate recorded was 41 beats / minute (3/19/2023). The maximum heart rate is 85 beats / minute (3/18/2023). The mean heart rate is 52 beats / minute. 4.No evidence of AV block is noted. 5.Rare premature atrial contractions noted. 6.No evidence of supraventricular tachycardia is noted.7.A FIB [place] 0%8.Rare premature ventricular contractions noted. 9.No evidence of ventricular tachycardia is noted.10.No pauses were noted. 11.Sinus 41-85 bpm, average 52 Low heart rates, possible chronotropic incompetence Rare ectopy No symptoms reported 3/17/2023 10:00:00 AM Miguel Angel Lopez MD     REVIEW OF SYSTEMS    REVIEW OF SYSTEMS  Header Details   Gastrointestinal Nausea   Cardiovascular No history of Chest pain, BUCK, Palpitations, Syncope, PND, Orthopnea, Edema, Claudication   Genitourinary No history of Dysuria, Hematuria, Frequency, ED   Musculoskeletal No history of Myalgias, Arthritis   Respiratory No history of SOB, Wheezing, Sputum   Neurological No history of Migraines, Numbness, Limb weakness   Hem/Lymphatic No history of Easy bruising, Blood clots, Hx of blood transfusion, Anemia, Bleeding problems     SOCIAL HISTORY    SOCIAL HISTORY  Social History Element Description Effective Dates   Smoking status Never smoked -      FUNCTIONAL STATUS    No data available    MEDICAL EQUIPMENT    No data available    Goals Sections    No data available    REASON FOR REFERRAL                       Health Concerns Section    No data available    COGNITIVE/MENTAL STATUS    No data available    Patient Demographics    Patient Demographics  Patient Address Communication Language Race / Ethnicity Marital Status   [address] [place] [address] [phone] (Home) English - Spoken (Preferred) White / Not  or       Document Information    Primary Care Provider Other Service Providers Document Coverage Dates   James Rahman  NPI: [id number]  [phone] (Work)  [address], [address], [place] [address]  [place] [address]  Interpreting Physicians  [facility]  [phone] (Work)  [address] [facility] [place] [address] Lynette Monaco  NPI: [id number]  [phone] (Work)  [address], [address], [place] [address]  [place] [address]  Nurses     Damari Bennett  NPI: [id number]  [phone] (Work)  [address], [address], [place] [address]  [place] [address]  Nurses     Sonali Murray  NPI: [id number]  [phone] (Work)  [address], [address], [place] [address]  [place] [address]  Nurses     Aby Croft  NPI: [id number]  [phone] (Work)  [address], [address], [place] [address]  [place] [address]  Nurses Dec. 12, 2023December 12, 2023      Organization   [facility]  [phone] (Work)  [address], [address], [place] [address]  [place] [address]     Encounter Providers Encounter Date    Dec. 12, 2023December 12, 2023     Legal Authenticator    James Rahman  NPI: [id number]  [phone] (Work)  [address], [address], [place] [address]  [place] [address]

## 2024-02-19 NOTE — PLAN OF CARE
Problem: Patient Centered Care  Goal: Patient preferences are identified and integrated in the patient's plan of care  Description: Interventions:  - What would you like us to know as we care for you? Pt from home alone  - Provide timely, complete, and accurate information to patient/family  - Incorporate patient and family knowledge, values, beliefs, and cultural backgrounds into the planning and delivery of care  - Encourage patient/family to participate in care and decision-making at the level they choose  - Honor patient and family perspectives and choices  Outcome: Progressing     Problem: Patient/Family Goals  Goal: Patient/Family Long Term Goal  Description: Patient's Long Term Goal:     Interventions:    - See additional Care Plan goals for specific interventions  Outcome: Progressing  Goal: Patient/Family Short Term Goal  Description: Patient's Short Term Goal:     Interventions:   - See additional Care Plan goals for specific interventions  Outcome: Progressing     Problem: PAIN - ADULT  Goal: Verbalizes/displays adequate comfort level or patient's stated pain goal  Description: INTERVENTIONS:  - Encourage pt to monitor pain and request assistance  - Assess pain using appropriate pain scale  - Administer analgesics based on type and severity of pain and evaluate response  - Implement non-pharmacological measures as appropriate and evaluate response  - Consider cultural and social influences on pain and pain management  - Manage/alleviate anxiety  - Utilize distraction and/or relaxation techniques  - Monitor for opioid side effects  - Notify MD/LIP if interventions unsuccessful or patient reports new pain  - Anticipate increased pain with activity and pre-medicate as appropriate  Outcome: Progressing     Problem: SAFETY ADULT - FALL  Goal: Free from fall injury  Description: INTERVENTIONS:  - Assess pt frequently for physical needs  - Identify cognitive and physical deficits and behaviors that affect risk  of falls.  - [place] fall precautions as indicated by assessment.  - Educate pt/family on patient safety including physical limitations  - Instruct pt to call for assistance with activity based on assessment  - Modify environment to reduce risk of injury  - Provide assistive devices as appropriate  - Consider OT/PT consult to assist with strengthening/mobility  - Encourage toileting schedule  Outcome: Progressing     Problem: DISCHARGE PLANNING  Goal: Discharge to home or other facility with appropriate resources  Description: INTERVENTIONS:  - Identify barriers to discharge w/pt and caregiver  - Include patient/family/discharge partner in discharge planning  - Arrange for needed discharge resources and transportation as appropriate  - Identify discharge learning needs (meds, wound care, etc)  - Arrange for interpreters to assist at discharge as needed  - Consider post-discharge preferences of patient/family/discharge partner  - Complete POLST form as appropriate  - Assess patient's ability to be responsible for managing their own health  - Refer to Case Management Department for coordinating discharge planning if the patient needs post-hospital services based on physician/LIP order or complex needs related to functional status, cognitive ability or social support system  Outcome: Progressing     Problem: CARDIOVASCULAR - ADULT  Goal: Maintains optimal cardiac output and hemodynamic stability  Description: INTERVENTIONS:  - Monitor vital signs, rhythm, and trends  - Monitor for bleeding, hypotension and signs of decreased cardiac output  - Evaluate effectiveness of vasoactive medications to optimize hemodynamic stability  - Monitor arterial and/or venous puncture sites for bleeding and/or hematoma  - Assess quality of pulses, skin color and temperature  - Assess for signs of decreased coronary artery perfusion - ex. Angina  - Evaluate fluid balance, assess for edema, trend weights  Outcome: Progressing  Goal:  Absence of cardiac arrhythmias or at baseline  Description: INTERVENTIONS:  - Continuous cardiac monitoring, monitor vital signs, obtain 12 lead EKG if indicated  - Evaluate effectiveness of antiarrhythmic and heart rate control medications as ordered  - Initiate emergency measures for life threatening arrhythmias  - Monitor electrolytes and administer replacement therapy as ordered  Outcome: Progressing     Problem: METABOLIC/FLUID AND ELECTROLYTES - ADULT  Goal: Glucose maintained within prescribed range  Description: INTERVENTIONS:  - Monitor Blood Glucose as ordered  - Assess for signs and symptoms of hyperglycemia and hypoglycemia  - Administer ordered medications to maintain glucose within target range  - Assess barriers to adequate nutritional intake and initiate nutrition consult as needed  - Instruct patient on self management of diabetes  Outcome: Progressing  Goal: Electrolytes maintained within normal limits  Description: INTERVENTIONS:  - Monitor labs and rhythm and assess patient for signs and symptoms of electrolyte imbalances  - Administer electrolyte replacement as ordered  - Monitor response to electrolyte replacements, including rhythm and repeat lab results as appropriate  - Fluid restriction as ordered  - Instruct patient on fluid and nutrition restrictions as appropriate  Outcome: Progressing  Goal: Hemodynamic stability and optimal renal function maintained  Description: INTERVENTIONS:  - Monitor labs and assess for signs and symptoms of volume excess or deficit  - Monitor intake, output and patient weight  - Monitor urine specific gravity, serum osmolarity and serum sodium as indicated or ordered  - Monitor response to interventions for patient's volume status, including labs, urine output, blood pressure (other measures as available)  - Encourage oral intake as appropriate  - Instruct patient on fluid and nutrition restrictions as appropriate  Outcome: Progressing

## 2024-02-19 NOTE — HISTORICAL OFFICE NOTE
[facility]  Outside Information  Continuity of Care Document  12/17/2023  Rachelle Hunter - 88 y.o. Female; born [birth year]Summary of episode note, generated on Feb. 18, 2024February 18, 2024   CHIEF COMPLAINT    CHIEF COMPLAINT  Reason for Visit/Chief Complaint   F/U   Patient is here for follow-up after cardioversion December 2023 normally seen regarding a paroxysmal A-fib history and after stopping amiodarone April 2023 due to debilitating nausea.She was in Dr. Rahman's office and had tachycardia possibly flutter versus SVT was sent to the Cath Lab cardioverted to sinus. Since 12/12/2023 she has maintained sinus rhythm today some PACs but sinus rhythm. Feels much better in sinus rhythm. Asked her to check her blood pressure and monitor to her heart rate at home. Has follow-up in April.Her nausea improved off medications as is her bradycardia. She is on midodrine for orthostasis from her primary and feels better. She is no palpitations or evidence of A-fib. No chest pain or shortness of breath staying active and walking for exercise.Normally seen for follow-up regarding arrhythmias and starting amiodarone January 2022 after recurrent symptomatic paroxysmal atrial fibrillation despite taking Multaq. Still no recurrent A-fib although heart rate is improved off amiodarone at 77 beats a minute sinus with long first-degree delay. Nausea is improved a lot she has no palpitations chest pain or shortness of breath. Leg edema is about the same she did wear stockings for couple months but not recently.Previous to being seen in April 2023 she reported felt nausea to the point where she cannot really go out of her apartment does not feel well she is keeping down food and water though. No palpitations only slight dizziness. Her TSH has been going up and her T4 is normal. She go to appoint with the stomach doctor but is not till the summer. She goes the ER and got a work-up that was  unremarkable. She had a monitor in March 2023 showed all sinus rhythm there was some chronotropic incompetence but it depended on her activity level which seems minimal and her heart rate max at only 85 beats a minute and average 52 beats a minute. Patient came in today with her son to help go over things.Previously noted: She feels better off the metoprolol heart rate slightly improved at 57 beats a minute. Still left bundle Long degree first-degree AV delay but no symptoms. We discussed her risk for needing a pacemaker in the future but no indications currently. She had no recurrent A. fib feeling well.Noted in October 2022 visit: She continues to have the lower heart rates she is severe sinus bradycardia 40 but denies any symptoms. Her cardiac testing shows her EF is now normal and her stress test normal perfusion. Her TSH was off in May but T4 was fine with being on amiodarone throughout the year we need follow-up blood test to evaluate this. Also chest x-ray annually was ordered.She is done better on amiodarone except in January February she still with some palpitations and shortness of breath with these episodes but that it got better the last month or 2. She was in [place] and enjoyed the time now she is back here.She did hit her right leg just above the ankle about a week and half ago and it still swelling although bruises resolving and slightly warm. Could not tell if there is cellulitis so asked her to follow-up with her primary today to evaluate.Patient has had some years history of A. fib but its acted up last couple months with more paroxysms that cause shortness of breath and dizziness. Only occasional palpitation and no chest pain. Her last nuclear stress test was 2018 and she had a left heart cath afterwards her echocardiogram was September 2020 and EF is about 43%.  She has no baseline congestive heart failure symptoms unless she has recurrent A. fib she is tolerating medications well.      PROBLEMS  Reconcile with Patient's ChartPROBLEMS  Problem Effective Dates Date resolved Problem Status   Varicose veins of both lower extremities with pain, [SNOMED-CT: 64868603] 11/1/2022 - Active   Atrial fibrillation (Afib), paroxysmal - A Fib, [SNOMED-CT: 829499261] 7/15/2021 - Active   Palpitations, [SNOMED-CT: 06127580] 8/29/2019 - Active   LV dysfunction, [SNOMED-CT: 941797719] 8/29/2019 - Active   DVT Chronic (deep vein thrombosis) of left popliteal vein, [SNOMED-CT: 068987383010033] 11/9/2021 - Active   Edema, localized, [SNOMED-CT: 572459561] 7/15/2021 - Active   Chronic systolic CHF (congestive heart failure), [SNOMED-CT: 897446288] 8/29/2019 - Active   Aortic atherosclerosis, [SNOMED-CT: 22879373] 8/29/2019 - Active   Mitral regurgitation, [SNOMED-CT: 16761180] 8/29/2019 - Active   Left bundle branch block, [SNOMED-CT: 16497876] 8/29/2019 - Active   Pulmonary hypertension, not otherwise specified or secondary, [SNOMED-CT: 31547653] 9/23/2020 - Active     ENCOUNTER DIAGNOSIS    ENCOUNTER DIAGNOSIS  Problem Effective Dates Date resolved Problem Status   Orthostatic hypotension, [SNOMED-CT: 71583334] 3/17/2023 - Active   Atrial fibrillation (Afib), paroxysmal - A Fib, [SNOMED-CT: 244341870] 7/15/2021 - Active   Edema, localized, [SNOMED-CT: 367649714] 7/15/2021 - Active   Aortic atherosclerosis, [SNOMED-CT: 23851755] 8/29/2019 - Active   Mitral regurgitation, [SNOMED-CT: 43426218] 8/29/2019 - Active   Left bundle branch block, [SNOMED-CT: 41290849] 8/29/2019 - Active   Pulmonary hypertension, not otherwise specified or secondary, [SNOMED-CT: 47695900] 9/23/2020 - Active     VITAL SIGNS    VITAL SIGNS  Date / Time: 12/18/2023   BP Systolic 136 mmHg   BP Diastolic 80 mmHg   Height 71 inches   Weight 170 lbs   Pulse Rate 92 bpm   BSA (Body Surface Area) 2 cc/m2   BMI (Body Mass Index) 23.7 cc/m2   Blood Pressure 136 / 80 mmHg     PHYSICAL EXAMINATION    PHYSICAL EXAMINATION  Header Details   Constitutional  98% o2   Vitals Left Arm Sitting  / 80 mmHg, Pulse rate 92 bpm, Regular, Height in 5' 11\", BMI: 23.7, Weight in 169.76 lbs (or) 77 kgs, BSA : 1.97 cc/m²   General Appearance No Acute Distress, Appropriate   Head/Eyes/Ears/Nose/Mouth/Throat Conjunctiva pink, Sclera Clear, Mucous membranes Moist   Neck Normal carotid pulsations   Respiratory Unlabored, Equal bilaterally   Cardiovascular Regular rhythm. Normal and normal S1 and S2   Gastrointestinal Abdomen soft, Non-tender, Normoactive bowel sounds   Musculoskeletal Normal spine   Gait Normal gait   Strength and tone Normal muscle strength   Upper Extremities No clubbing, No cyanosis   Lower Extremities Pulses 2+ and equal bilaterally, No edema   Skin Warm and dry, No rashes or lesions   Neurologic / Psychiatric Alert and Oriented   Speech Normal speech     ALLERGIES, ADVERSE REACTIONS, ALERTS    ALLERGIES, ADVERSE REACTIONS, ALERTS  Type Substance Reaction Severity Status   Allergies Sulfa (Sulfonamide Antibiotics) - Allergen hives Severe Active     MEDICATIONS ADMINISTERED DURING VISIT    No data available    MEDICATIONS    MEDICATIONS  Medication Start Date Route/Frequency Status   acetaminophen (TYLENOL) 500 MG tablet, [RxNorm: 295815] 9/22/2021 Take 500 mg by mouth as needed. Active   cranberry 400 mg capsule, [RxNorm: 0] 4/4/2023 Take 1 capsule orally once a day. Active   latanoprost (XALATAN) 0.005 % ophthalmic solution, [RxNorm: 526650] 9/22/2021 Use 1 drop in both eyes at bedtime Active   midodrine 5 mg tablet, [RxNorm: 729905] 6/5/2023 Take 1 tablet orally 2 times a day. Active   Xarelto 20 mg tablet, [RxNorm: 4102796] 10/6/2023 Take 1 tablet orally once a day. Active     ASSESSMENT    Atrial fibrillation, history of persistent and paroxysmal in sinus  Required cardioversion December 12, 2023 now maintaining sinus rhythm  Feels much better  Will continue off antiarrhythmics if recurrences consider other options might need pacemaker  No recurrences off  medications  No palpitations or symptoms  Now off amiodarone since April 2023  No recurrent atrial fibrillation  Heart rate in sinus first-degree delay is improved to 77 beats a minute  We will stay off any antiarrhythmic and have her follow-up in 4 months sooner if any symptoms indicating recurrenceNoted in April 2023: Complains of debilitating nausea but is more than a year after starting amiodarone so seems unusually related although she is convinced is related is really nothing else  Given the symptoms we will stop amiodarone If the A-fib recurs we can consider alternative treatment such as a pacemaker plus medications or just antiarrhythmicsPreviously noted: Continues in sinus rhythm on amiodarone but has TSH abnormality also worsening bradycardia but no symptoms  EF is normal echo in May and PET in June 2022 with EF between 58 and 62%.Switched to amiodarone January 2022  She is symptomatic highly recurrent paroxysmal atrial fibrillation despite being on Multaq  She has LV dysfunction with EF of 43% now EF improved to 58% on echo April 2022  She has a propensity towards congestive heart failure especially with recurrent A. fib type symptoms of dyspnea on exertion and dizziness  She has left bundle branch block baseline  Discussed rhythm versus rate control strategy and for further rhythm control strategy would recommend switching to amiodarone versus ablation and she went to proceed with amiodarone.  Continues on Xarelto without bleeding problemsCardiomyopathy  Normalized  With significant bradycardia we will stop metoprolol at this point and reevaluate  EF improved to 58% in sinus on amnio was shows an EF of 43%  Propensity towards congestive heart failure specially with recurrent A. fib  Off metoprolol due to bradycardiaBradycardia  Resolved off medicationsOrthostasis  Midodrine prescribed by primary feels a lot better on this  Blood pressures not highPlan  Continue current medications  Monitor heart rate and  blood pressure as discussed  Follow-up in April as discussed     FAMILY HISTORY    FAMILY HISTORY  Relationship Age Diagnosis   Father 0 Atrial fibrillation (Afib), paroxysmal - A Fib     GENERAL STATUS    No data available    PAST MEDICAL HISTORY    PAST MEDICAL HISTORY  Problem Date diagonsed Date resolved Status   Varicose veins of both lower extremities with pain, [SNOMED-CT: 82176305] 11/1/2022 - Active   Atrial fibrillation (Afib), paroxysmal - A Fib, [SNOMED-CT: 952810956] 7/15/2021 - Active   Palpitations, [SNOMED-CT: 26788487] 8/29/2019 - Active   LV dysfunction, [SNOMED-CT: 032393838] 8/29/2019 - Active   DVT Chronic (deep vein thrombosis) of left popliteal vein, [SNOMED-CT: 843267117122209] 11/9/2021 - Active   Edema, localized, [SNOMED-CT: 091666680] 7/15/2021 - Active   Chronic systolic CHF (congestive heart failure), [SNOMED-CT: 495114766] 8/29/2019 - Active   Aortic atherosclerosis, [SNOMED-CT: 61282850] 8/29/2019 - Active   Mitral regurgitation, [SNOMED-CT: 48185619] 8/29/2019 - Active   Left bundle branch block, [SNOMED-CT: 87628688] 8/29/2019 - Active   Pulmonary hypertension, not otherwise specified or secondary, [SNOMED-CT: 08474902] 9/23/2020 - Active     HISTORY OF PRESENT ILLNESS    Patient is here for follow-up after cardioversion December 2023 normally seen regarding a paroxysmal A-fib history and after stopping amiodarone April 2023 due to debilitating nausea.She was in Dr. Rahman's office and had tachycardia possibly flutter versus SVT was sent to the Cath Lab cardioverted to sinus. Since 12/12/2023 she has maintained sinus rhythm today some PACs but sinus rhythm. Feels much better in sinus rhythm. Asked her to check her blood pressure and monitor to her heart rate at home. Has follow-up in April.Her nausea improved off medications as is her bradycardia. She is on midodrine for orthostasis from her primary and feels better. She is no palpitations or evidence of A-fib. No chest pain or  shortness of breath staying active and walking for exercise.Normally seen for follow-up regarding arrhythmias and starting amiodarone January 2022 after recurrent symptomatic paroxysmal atrial fibrillation despite taking Multaq. Still no recurrent A-fib although heart rate is improved off amiodarone at 77 beats a minute sinus with long first-degree delay. Nausea is improved a lot she has no palpitations chest pain or shortness of breath. Leg edema is about the same she did wear stockings for couple months but not recently.Previous to being seen in April 2023 she reported felt nausea to the point where she cannot really go out of her apartment does not feel well she is keeping down food and water though. No palpitations only slight dizziness. Her TSH has been going up and her T4 is normal. She go to appoint with the stomach doctor but is not till the summer. She goes the ER and got a work-up that was unremarkable. She had a monitor in March 2023 showed all sinus rhythm there was some chronotropic incompetence but it depended on her activity level which seems minimal and her heart rate max at only 85 beats a minute and average 52 beats a minute. Patient came in today with her son to help go over things.Previously noted: She feels better off the metoprolol heart rate slightly improved at 57 beats a minute. Still left bundle Long degree first-degree AV delay but no symptoms. We discussed her risk for needing a pacemaker in the future but no indications currently. She had no recurrent A. fib feeling well.Noted in October 2022 visit: She continues to have the lower heart rates she is severe sinus bradycardia 40 but denies any symptoms. Her cardiac testing shows her EF is now normal and her stress test normal perfusion. Her TSH was off in May but T4 was fine with being on amiodarone throughout the year we need follow-up blood test to evaluate this. Also chest x-ray annually was ordered.She is done better on amiodarone  except in January February she still with some palpitations and shortness of breath with these episodes but that it got better the last month or 2. She was in [place] and enjoyed the time now she is back here.She did hit her right leg just above the ankle about a week and half ago and it still swelling although bruises resolving and slightly warm. Could not tell if there is cellulitis so asked her to follow-up with her primary today to evaluate.Patient has had some years history of A. fib but its acted up last couple months with more paroxysms that cause shortness of breath and dizziness. Only occasional palpitation and no chest pain. Her last nuclear stress test was 2018 and she had a left heart cath afterwards her echocardiogram was September 2020 and EF is about 43%.  She has no baseline congestive heart failure symptoms unless she has recurrent A. fib she is tolerating medications well.     IMMUNIZATIONS    No data available    PLAN OF CARE    PLAN OF CARE  Planned Care Date   EKG (electrocardiogram) 1/1/1900   Referral Visit - Stoney Darnell ([email or web address]) : 1/1/1900     PROCEDURES    PROCEDURES  NAME TYPE DATE   Cardioversion, elective, electrical conversion of arrhythmia; external [SNOMED CT: 087083235] Procedure 9/28/2021     RESULTS    RESULTS  Name Result Date Location - Ordered By   GLUCOSE [LOINC: 2339-0] 101 mg/dL [High] 12/12/2023 12:18:00 PM [facility] ([facility])  Address: [address] [facility]  [address]  tel:   SODIUM [LOINC: 2951-2] 138 mmol/L 12/12/2023 12:18:00 PM [facility] ([facility])  Address: [address]  [facility]  [address]  tel:   POTASSIUM [LOINC: 2823-3] 4.5 mmol/L 12/12/2023 12:18:00 PM [facility] ([facility])  Address: [address]  [facility]  [address]  tel:   CHLORIDE [LOINC: 2075-0] 104 mmol/L 12/12/2023 12:18:00 PM [facility] ([facility])  Address:  [address]  [facility]  [address]  tel:   CO2 [LOINC: 2028-9] 30.0 mmol/L 12/12/2023 12:18:00 PM [facility] ([facility])  Address: [address]  [facility]  [address]  tel:   ANION GAP [LOINC: 33037-3] 4 mmol/L 12/12/2023 12:18:00 PM [facility] ([facility])  Address: [address]  [facility]  [address]  tel:   BUN [LOINC: 6299-2] 13 mg/dL 12/12/2023 12:18:00 PM [facility] ([facility])  Address: [address]  [facility]  [address]  tel:   CREATININE [LOINC: 80236-1] 1.14 mg/dL [High] 12/12/2023 12:18:00 PM [facility] ([facility])  Address: [address]  [facility]  [address]  tel:   BUN/ CREAT RATIO [LOINC: 3097-3] 11.4 12/12/2023 12:18:00 PM [facility] ([facility])  Address: [address]  [facility]  [address]  tel:   CALCIUM [LOINC: 98782-6] 9.2 mg/dL 12/12/2023 12:18:00 PM [facility] ([facility])  Address: [address]  [facility]  [address]  tel:   OSMOLALITY CALCULATED [LOINC: 97582-7] 286 mOsm/kg 12/12/2023 12:18:00 PM [facility] ([facility])  Address: [address]  [facility]  [address]  tel:   E GFR CR [LOINC: 18711-8] 47 mL/min/1.73m2 [Low] 12/12/2023 12:18:00 PM [facility] ([facility])  Address: [address]  [facility]  [address]  tel:   FASTING PATIENT BMP ANSWER [LOINC: 49540-3] No 12/12/2023 12:18:00 PM [facility] ([facility])  Address: [address]  [facility]  [address]  tel:   TSH [LOINC: 44468-6] 7.180 mIU/mL [High] 03/31/2023 07:53:00 AM [facility] ([facility])  Address: [address]  [facility]  [address]  tel:   GLUCOSE [LOINC: 2339-0] 106 mg/dL [High] 03/31/2023 07:53:00 AM [facility] ([facility])  Address: [address]  [facility]  [address]  tel:   SODIUM [LOINC: 2951-2]  141 mmol/L 03/31/2023 07:53:00 AM [facility] ([facility])  Address: [address]  [facility]  [address]  tel:   POTASSIUM [LOINC: 2823-3] 3.9 mmol/L 03/31/2023 07:53:00 AM [facility] ([facility])  Address: [address]  [facility]  [address]  tel:   CHLORIDE [LOINC: 2075-0] 106 mmol/L 03/31/2023 07:53:00 AM [facility] ([facility])  Address: [address]  [facility]  [address]  tel:   CO2 [LOINC: 2028-9] 31.0 mmol/L 03/31/2023 07:53:00 AM [facility] ([facility])  Address: [address]  [facility]  [address]  tel:   ANION GAP [LOINC: 33037-3] 4 mmol/L 03/31/2023 07:53:00 AM [facility] ([facility])  Address: [address]  [facility]  [address]  tel:   BUN [LOINC: 6299-2] 19 mg/dL [High] 03/31/2023 07:53:00 AM [facility] ([facility])  Address: [address]  [facility]  [address]  tel:   CREATININE [LOINC: 08227-8] 1.13 mg/dL [High] 03/31/2023 07:53:00 AM [facility] ([facility])  Address: [address]  [facility]  [address]  tel:   BUN/ CREAT RATIO [LOINC: 3097-3] 16.8 03/31/2023 07:53:00 AM [facility] ([facility])  Address: [address]  [facility]  [address]  tel:   CALCIUM [LOINC: 48164-5] 8.9 mg/dL 03/31/2023 07:53:00 AM [facility] ([facility])  Address: [address]  [facility]  [address]  tel:   OSMOLALITY CALCULATED [LOINC: 34298-8] 295 mOsm/kg 03/31/2023 07:53:00 AM [facility] ([facility])  Address: [address]  [facility]  [address]  tel:   E GFR CR [LOINC: 64559-7] 48 mL/min/1.73m2 [Low] 03/31/2023 07:53:00 AM [facility] ([facility])  Address: [address]  [facility]  [address]  tel:   ALT [LOINC: 1742-6] 37 U/L 03/31/2023 07:53:00 AM [facility] ([facility])  Address: [address]  [facility]  [address]  tel:   AST [LOINC: 1920-8] 30 U/L 03/31/2023 07:53:00 AM [facility] ([facility])  Address: [address]  [facility]  [address]  tel:   ALKALINE PHOSPHATASE [LOINC: 1779-8] 140 U/L 03/31/2023 07:53:00 AM [facility] ([facility])  Address: [address]  [facility]  [address]  tel:   BILIRUBIN, TOTAL [LOINC: 1975-2] 0.4 mg/dL 03/31/2023 07:53:00 AM [facility] ([facility])  Address: [address]  [facility]  [address]  tel:   TOTAL PROTEIN [LOINC: 2885-2] 7.0 g/dL 03/31/2023 07:53:00 AM [facility] ([facility])  Address: [address]  [facility]  [address]  tel:   ALBUMIN [LOINC: 1751-7] 3.0 g/dL [Low] 03/31/2023 07:53:00 AM [facility] ([facility])  Address: [address]  [facility]  [address]  tel:   GLOBULIN [LOINC: 68698-5] 4.0 g/dL 03/31/2023 07:53:00 AM [facility] ([facility])  Address: [address]  [facility]  [address]  tel:   A/G RATIO [LOINC: 1759-0] 0.8 [Low] 03/31/2023 07:53:00 AM [facility] ([facility])  Address: [address]  [facility]  [address]  tel:   FASTING PATIENT CMP ANSWER [LOINC: 36723-6] Yes 03/31/2023 07:53:00 AM [facility] ([facility])  Address: [address]  [facility]  [address]  tel:   FREE T4 [LOINC: 3024-7] 1.4 ng/dL 03/17/2023 09:55:00 AM [facility] ([facility])  Address: [address]  [facility]  [address]  tel:   TSH [LOINC: 07237-4] 5.460 mIU/mL [High] 03/17/2023 09:55:00 AM [facility] ([facility])  Address: [address] [facility]  [address]  tel:   Nuclear PET 1.Stress EKG is non-diagnostic due to LBBB.2.Sinus arrhythmia and PACs.3.No chest pain.4.Baseline HTN.1.This is a normal perfusion study, no perfusion defects noted. 2.The left ventricular cavity is noted  to be mildly enlarged on the stress studies. The stress left ventricular ejection fraction was calculated to be 66% and left ventricular global function is normal. The rest left ventricular cavity is noted to be normal. The rest left ventricular ejection fraction was calculated to be 62% and rest left ventricular global function is normal. 3.This scan is suggestive of low risk for future cardiovascular events. 4.The study quality is average. 6/7/2022 10:30:00 AM Stoney Alaniz MD   Trans Thoracic Echocardiogram 1.The left ventricle is mildly enlarged. Mild left ventricular hypertrophy is noted. Global left ventricular systolic function is normal. The left ventricular ejection fraction is 58%. No significant wall motion abnormalities noted. Left ventricular diastolic function is normal. 2.The anterior mitral leaflet is mildly thickened. Mild (1+) mitral regurgitation.3.The pulmonary artery systolic pressure is 34 mmHg. 4/12/2022 12:30:00 PM Miguel Angel Lopez MD   Lower Extremity Venous Ultrasound 1.The study quality is good. 2.Exam performed with the patient in reverse Trendelenburg position.3.Evidence of fibrin strands noted in the left proximal femoral vein suggestive of chronic thrombus, this is unchanged from the previous study.4.No evidence of deep vein thrombus in right lower extremity.5.Reflux is noted in the right & left GSV, refer to chart below.6.Reflux is noted in the left SSV mid calf = 0.7sec.7.Perforators visualized in right distal calf = 3mm and left mid calf = 2mm, but no insufficiency noted. 10/25/2022 9:00:00 AM Nicholas Case MD   Holter Monitoring 1.This is an excellent quality study. 2.Predominant rhythm is sinus bradycardia. 3.The minimum heart rate recorded was 41 beats / minute (3/19/2023). The maximum heart rate is 85 beats / minute (3/18/2023). The mean heart rate is 52 beats / minute. 4.No evidence of AV block is noted. 5.Rare premature atrial contractions noted. 6.No evidence of supraventricular  tachycardia is noted.7.A FIB [place] 0%8.Rare premature ventricular contractions noted. 9.No evidence of ventricular tachycardia is noted.10.No pauses were noted. 11.Sinus 41-85 bpm, average 52 Low heart rates, possible chronotropic incompetence Rare ectopy No symptoms reported 3/17/2023 10:00:00 AM Miguel Angel Lopez MD     REVIEW OF SYSTEMS    REVIEW OF SYSTEMS  Header Details   Eyes No history of Blurry vision, Visual changes, Double vision   Cardiovascular No history of Chest pain, BUCK, Palpitations, Syncope, PND, Orthopnea, Edema, Claudication   Respiratory No history of SOB, Wheezing, Sputum   Hem/Lymphatic No history of Easy bruising, Blood clots, Hx of blood transfusion, Anemia, Bleeding problems     SOCIAL HISTORY    SOCIAL HISTORY  Social History Element Description Effective Dates   Smoking status Never smoked -     FUNCTIONAL STATUS    No data available    MEDICAL EQUIPMENT    No data available    Goals Sections    No data available    REASON FOR REFERRAL                     Health Concerns Section    No data available    COGNITIVE/MENTAL STATUS    No data available    Patient Demographics    Patient Demographics  Patient Address Communication Language Race / Ethnicity Marital Status   [address] [place] [address] [phone] (Home) English - Spoken (Preferred) White / Not  or       Document Information    Primary Care Provider Other Service Providers Document Coverage Dates   Miguel Angel Lopez  NPI: [id number]  [phone] (Work)  [address], [address], [place] [address]  [place] [address]  Interpreting Physicians  [facility]  [phone] (Work)  [address] [place] [address] Tamela Palacios  NPI: [id number]  [phone] (Work)  [address], [address], [place] [address]  [place] [address]  Nurses     Jaya Garza  NPI: [id number]  [phone] (Work)  [address], [address], [facility]  [place] [address]  [place] [address]  Nurses     Aby Croft  NPI: [id number]  [phone] (Work)  [address], [address], [place] [address]  [place] [address]  Nurses Dec. 18, 2023December 18, 2023      Organization   [facility]  [phone] (Work)  [address], [address], [place] [address]  [place] [address]     Encounter Providers Encounter Date    Dec. 18, 2023December 18, 2023     Legal Authenticator    Miguel Angel Lopez  NPI: [id number]  [phone] (Work)  [address], [address], [place] [address]  [place] [address]

## 2024-02-19 NOTE — ED INITIAL ASSESSMENT (HPI)
Patient presents to ED with intermittent chest pain, sob, and nausea since Thursday that became worse today, prompting her ER visit. Hx of afib.

## 2024-02-20 VITALS
DIASTOLIC BLOOD PRESSURE: 55 MMHG | HEART RATE: 61 BPM | OXYGEN SATURATION: 99 % | TEMPERATURE: 97 F | SYSTOLIC BLOOD PRESSURE: 117 MMHG | RESPIRATION RATE: 20 BRPM | WEIGHT: 176.5 LBS | BODY MASS INDEX: 24.71 KG/M2 | HEIGHT: 71 IN

## 2024-02-20 LAB
ANION GAP SERPL CALC-SCNC: 5 MMOL/L (ref 0–18)
ATRIAL RATE: 61 BPM
ATRIAL RATE: 61 BPM
BUN BLD-MCNC: 20 MG/DL (ref 9–23)
BUN/CREAT SERPL: 17.9 (ref 10–20)
CALCIUM BLD-MCNC: 9 MG/DL (ref 8.7–10.4)
CHLORIDE SERPL-SCNC: 107 MMOL/L (ref 98–112)
CO2 SERPL-SCNC: 29 MMOL/L (ref 21–32)
CREAT BLD-MCNC: 1.12 MG/DL
EGFRCR SERPLBLD CKD-EPI 2021: 48 ML/MIN/1.73M2 (ref 60–?)
GLUCOSE BLD-MCNC: 95 MG/DL (ref 70–99)
MAGNESIUM SERPL-MCNC: 2.1 MG/DL (ref 1.6–2.6)
OSMOLALITY SERPL CALC.SUM OF ELEC: 294 MOSM/KG (ref 275–295)
P AXIS: 72 DEGREES
P AXIS: 75 DEGREES
P-R INTERVAL: 256 MS
P-R INTERVAL: 256 MS
POTASSIUM SERPL-SCNC: 4.3 MMOL/L (ref 3.5–5.1)
Q-T INTERVAL: 476 MS
Q-T INTERVAL: 516 MS
QRS DURATION: 100 MS
QRS DURATION: 104 MS
QTC CALCULATION (BEZET): 479 MS
QTC CALCULATION (BEZET): 519 MS
R AXIS: -31 DEGREES
R AXIS: -37 DEGREES
SODIUM SERPL-SCNC: 141 MMOL/L (ref 136–145)
T AXIS: 66 DEGREES
T AXIS: 66 DEGREES
VENTRICULAR RATE: 61 BPM
VENTRICULAR RATE: 61 BPM

## 2024-02-20 NOTE — PLAN OF CARE
Problem: Patient Centered Care  Goal: Patient preferences are identified and integrated in the patient's plan of care  Description: Interventions:  - What would you like us to know as we care for you? Home alone  - Provide timely, complete, and accurate information to patient/family  - Incorporate patient and family knowledge, values, beliefs, and cultural backgrounds into the planning and delivery of care  - Encourage patient/family to participate in care and decision-making at the level they choose  - Honor patient and family perspectives and choices  Outcome: Adequate for Discharge     Problem: Patient/Family Goals  Goal: Patient/Family Long Term Goal  Description: Patient's Long Term Goal: go home    Interventions:  - See additional Care Plan goals for specific interventions  Outcome: Adequate for Discharge  Goal: Patient/Family Short Term Goal  Description: Patient's Short Term Goal: go home    Interventions:   - See additional Care Plan goals for specific interventions  Outcome: Adequate for Discharge     Problem: PAIN - ADULT  Goal: Verbalizes/displays adequate comfort level or patient's stated pain goal  Description: INTERVENTIONS:  - Encourage pt to monitor pain and request assistance  - Assess pain using appropriate pain scale  - Administer analgesics based on type and severity of pain and evaluate response  - Implement non-pharmacological measures as appropriate and evaluate response  - Consider cultural and social influences on pain and pain management  - Manage/alleviate anxiety  - Utilize distraction and/or relaxation techniques  - Monitor for opioid side effects  - Notify MD/LIP if interventions unsuccessful or patient reports new pain  - Anticipate increased pain with activity and pre-medicate as appropriate  Outcome: Adequate for Discharge     Problem: SAFETY ADULT - FALL  Goal: Free from fall injury  Description: INTERVENTIONS:  - Assess pt frequently for physical needs  - Identify cognitive and  physical deficits and behaviors that affect risk of falls.  - [place] fall precautions as indicated by assessment.  - Educate pt/family on patient safety including physical limitations  - Instruct pt to call for assistance with activity based on assessment  - Modify environment to reduce risk of injury  - Provide assistive devices as appropriate  - Consider OT/PT consult to assist with strengthening/mobility  - Encourage toileting schedule  Outcome: Adequate for Discharge     Problem: DISCHARGE PLANNING  Goal: Discharge to home or other facility with appropriate resources  Description: INTERVENTIONS:  - Identify barriers to discharge w/pt and caregiver  - Include patient/family/discharge partner in discharge planning  - Arrange for needed discharge resources and transportation as appropriate  - Identify discharge learning needs (meds, wound care, etc)  - Arrange for interpreters to assist at discharge as needed  - Consider post-discharge preferences of patient/family/discharge partner  - Complete POLST form as appropriate  - Assess patient's ability to be responsible for managing their own health  - Refer to Case Management Department for coordinating discharge planning if the patient needs post-hospital services based on physician/LIP order or complex needs related to functional status, cognitive ability or social support system  Outcome: Adequate for Discharge     Problem: CARDIOVASCULAR - ADULT  Goal: Maintains optimal cardiac output and hemodynamic stability  Description: INTERVENTIONS:  - Monitor vital signs, rhythm, and trends  - Monitor for bleeding, hypotension and signs of decreased cardiac output  - Evaluate effectiveness of vasoactive medications to optimize hemodynamic stability  - Monitor arterial and/or venous puncture sites for bleeding and/or hematoma  - Assess quality of pulses, skin color and temperature  - Assess for signs of decreased coronary artery perfusion - ex. Angina  - Evaluate fluid  balance, assess for edema, trend weights  Outcome: Adequate for Discharge  Goal: Absence of cardiac arrhythmias or at baseline  Description: INTERVENTIONS:  - Continuous cardiac monitoring, monitor vital signs, obtain 12 lead EKG if indicated  - Evaluate effectiveness of antiarrhythmic and heart rate control medications as ordered  - Initiate emergency measures for life threatening arrhythmias  - Monitor electrolytes and administer replacement therapy as ordered  Outcome: Adequate for Discharge     Problem: METABOLIC/FLUID AND ELECTROLYTES - ADULT  Goal: Glucose maintained within prescribed range  Description: INTERVENTIONS:  - Monitor Blood Glucose as ordered  - Assess for signs and symptoms of hyperglycemia and hypoglycemia  - Administer ordered medications to maintain glucose within target range  - Assess barriers to adequate nutritional intake and initiate nutrition consult as needed  - Instruct patient on self management of diabetes  Outcome: Adequate for Discharge  Goal: Electrolytes maintained within normal limits  Description: INTERVENTIONS:  - Monitor labs and rhythm and assess patient for signs and symptoms of electrolyte imbalances  - Administer electrolyte replacement as ordered  - Monitor response to electrolyte replacements, including rhythm and repeat lab results as appropriate  - Fluid restriction as ordered  - Instruct patient on fluid and nutrition restrictions as appropriate  Outcome: Adequate for Discharge  Goal: Hemodynamic stability and optimal renal function maintained  Description: INTERVENTIONS:  - Monitor labs and assess for signs and symptoms of volume excess or deficit  - Monitor intake, output and patient weight  - Monitor urine specific gravity, serum osmolarity and serum sodium as indicated or ordered  - Monitor response to interventions for patient's volume status, including labs, urine output, blood pressure (other measures as available)  - Encourage oral intake as appropriate  -  Instruct patient on fluid and nutrition restrictions as appropriate  Outcome: Adequate for Discharge   Patient denies any complaint of pain, no shortness of breath. Vital signs stable. NSR on the monitor. Seen and examined by Dr. Lopez and Dr. Patrick. Discharge order written. Saline lock and telemetry discontinued. Discharged instructions given and discussed with patient. Instructions includes follow up appointment with Dr. Lopez, medications to stop and medications to continue taking at home and when to take the next dose. Patient verbalized understanding of all instructions given. Patient discharged home with son, patient in stable condition upon discharge.

## 2024-02-20 NOTE — PLAN OF CARE
Problem: Patient Centered Care  Goal: Patient preferences are identified and integrated in the patient's plan of care  Description: Interventions:  - What would you like us to know as we care for you? Patient lives at home by herself.   - Provide timely, complete, and accurate information to patient/family  - Incorporate patient and family knowledge, values, beliefs, and cultural backgrounds into the planning and delivery of care  - Encourage patient/family to participate in care and decision-making at the level they choose  - Honor patient and family perspectives and choices  Outcome: Progressing     Problem: Patient/Family Goals  Goal: Patient/Family Long Term Goal  Description: Patient's Long Term Goal: to go home    Interventions:  - See additional Care Plan goals for specific interventions  Outcome: Progressing  Goal: Patient/Family Short Term Goal  Description: Patient's Short Term Goal: to feel better     Interventions:   - See additional Care Plan goals for specific interventions  Outcome: Progressing     Problem: PAIN - ADULT  Goal: Verbalizes/displays adequate comfort level or patient's stated pain goal  Description: INTERVENTIONS:  - Encourage pt to monitor pain and request assistance  - Assess pain using appropriate pain scale  - Administer analgesics based on type and severity of pain and evaluate response  - Implement non-pharmacological measures as appropriate and evaluate response  - Consider cultural and social influences on pain and pain management  - Manage/alleviate anxiety  - Utilize distraction and/or relaxation techniques  - Monitor for opioid side effects  - Notify MD/LIP if interventions unsuccessful or patient reports new pain  - Anticipate increased pain with activity and pre-medicate as appropriate  Outcome: Progressing     Problem: SAFETY ADULT - FALL  Goal: Free from fall injury  Description: INTERVENTIONS:  - Assess pt frequently for physical needs  - Identify cognitive and physical  deficits and behaviors that affect risk of falls.  - [place] fall precautions as indicated by assessment.  - Educate pt/family on patient safety including physical limitations  - Instruct pt to call for assistance with activity based on assessment  - Modify environment to reduce risk of injury  - Provide assistive devices as appropriate  - Consider OT/PT consult to assist with strengthening/mobility  - Encourage toileting schedule  Outcome: Progressing     Problem: DISCHARGE PLANNING  Goal: Discharge to home or other facility with appropriate resources  Description: INTERVENTIONS:  - Identify barriers to discharge w/pt and caregiver  - Include patient/family/discharge partner in discharge planning  - Arrange for needed discharge resources and transportation as appropriate  - Identify discharge learning needs (meds, wound care, etc)  - Arrange for interpreters to assist at discharge as needed  - Consider post-discharge preferences of patient/family/discharge partner  - Complete POLST form as appropriate  - Assess patient's ability to be responsible for managing their own health  - Refer to Case Management Department for coordinating discharge planning if the patient needs post-hospital services based on physician/LIP order or complex needs related to functional status, cognitive ability or social support system  Outcome: Progressing     Problem: CARDIOVASCULAR - ADULT  Goal: Maintains optimal cardiac output and hemodynamic stability  Description: INTERVENTIONS:  - Monitor vital signs, rhythm, and trends  - Monitor for bleeding, hypotension and signs of decreased cardiac output  - Evaluate effectiveness of vasoactive medications to optimize hemodynamic stability  - Monitor arterial and/or venous puncture sites for bleeding and/or hematoma  - Assess quality of pulses, skin color and temperature  - Assess for signs of decreased coronary artery perfusion - ex. Angina  - Evaluate fluid balance, assess for edema, trend  weights  Outcome: Progressing  Goal: Absence of cardiac arrhythmias or at baseline  Description: INTERVENTIONS:  - Continuous cardiac monitoring, monitor vital signs, obtain 12 lead EKG if indicated  - Evaluate effectiveness of antiarrhythmic and heart rate control medications as ordered  - Initiate emergency measures for life threatening arrhythmias  - Monitor electrolytes and administer replacement therapy as ordered  Outcome: Progressing     Problem: METABOLIC/FLUID AND ELECTROLYTES - ADULT  Goal: Glucose maintained within prescribed range  Description: INTERVENTIONS:  - Monitor Blood Glucose as ordered  - Assess for signs and symptoms of hyperglycemia and hypoglycemia  - Administer ordered medications to maintain glucose within target range  - Assess barriers to adequate nutritional intake and initiate nutrition consult as needed  - Instruct patient on self management of diabetes  Outcome: Progressing  Goal: Electrolytes maintained within normal limits  Description: INTERVENTIONS:  - Monitor labs and rhythm and assess patient for signs and symptoms of electrolyte imbalances  - Administer electrolyte replacement as ordered  - Monitor response to electrolyte replacements, including rhythm and repeat lab results as appropriate  - Fluid restriction as ordered  - Instruct patient on fluid and nutrition restrictions as appropriate  Outcome: Progressing  Goal: Hemodynamic stability and optimal renal function maintained  Description: INTERVENTIONS:  - Monitor labs and assess for signs and symptoms of volume excess or deficit  - Monitor intake, output and patient weight  - Monitor urine specific gravity, serum osmolarity and serum sodium as indicated or ordered  - Monitor response to interventions for patient's volume status, including labs, urine output, blood pressure (other measures as available)  - Encourage oral intake as appropriate  - Instruct patient on fluid and nutrition restrictions as appropriate  Outcome:  Progressing   Patient s/p cardioversion yesterday for afib with RVR. Patient has been sinus rhythm in 50-60's overnight. Patient did have 2 episodes of 21 beats of v-tach overnight but was asymptomatic at the time. VSS. No complaints of pain. Possible discharge today.  Will continue to monitor patient.

## 2024-02-20 NOTE — PROGRESS NOTES
[facility] - CARDIOLOGY PROGRESS NOTE      Rachelle Hunter Patient Status:  Observation    1935 MRN [id number]   Location [facility] 1W Attending Gypsy Patrick MD   Hosp Day # 0 PCP Stoney Darnell MD         Assessment and Plan:     Atrial fibrillation, persistent in sinus  Postop day 1 status post cardioversion  Previous intolerance to amiodarone  Multaq previously ineffective  Associate with symptoms and worsening EF  Discussed options of recurrences since 2 cardioversions within 2 months  Next steps would recommend biventricular pacemaker consideration for AV node ablation versus A-fib ablation  Other considerations of inpatient loading with Tikosyn or sotalol discussed  Will discharge home current anticoagulation for follow-up in a week  Will need follow-up echocardiogram given worsening LV dysfunction with A-fib in the hospital    Heart failure with reduced ejection fraction  History of cardiomyopathy with worsening ejection fraction while in A-fib  Estimated be 33% ejection fraction  Given left bundle branch block bradycardia chronic hypotension orthostatic hypotension is difficulty with goal-directed medical therapy  Ideally if in sinus rhythm EF should improve we will recheck echocardiogram EF outpatient  Considerations for biventricular pacemaker given the recurrent arrhythmias and resulting in LV dysfunction discussed    Chronic orthostatic hypotension  On midodrine    Plan  Discharge home today  Follow-up in 1 week  No medication changes    Subjective:   Rachelle Hunter is a(n) 88 year old female with no new c/o today, feels better post cardioversion yesterday now in sinus    Objective:   Blood pressure 117/55, pulse 61, temperature 97.3 °F (36.3 °C), temperature source Axillary, resp. rate 20, height 5' 11\" (1.803 m), weight 176 lb 8 oz (80.1 kg), SpO2 99%.    Exam  Gen: No acute distress, alert and oriented   Neck:supple,no JVD  Pulm: Lungs ok, normal respiratory  effort  CV: Heart with regular rate and rhythm,no pathologic murmur  Abd: Abdomen soft, nontender, nondistended,  bowel sounds present  Ext:  no clubbing, no cyanosis  Neuro: no focal deficits  Skin: no rashes or lesions      Scheduled Meds:    rivaroxaban  20 mg Oral Daily with food    dilTIAZem  10 mg Intravenous Once    [Held by provider] dilTIAZem  30 mg Oral 4 times per day    latanoprost  1 drop Both Eyes Nightly       Continuous Infusions:     Results:     Lab Results   Component Value Date    WBC 6.7 02/18/2024    HGB 13.8 02/18/2024    HCT 42.9 02/18/2024    .0 02/18/2024    CREATSERUM 1.12 (H) 02/20/2024    BUN 20 02/20/2024     02/20/2024    K 4.3 02/20/2024     02/20/2024    CO2 29.0 02/20/2024    GLU 95 02/20/2024    CA 9.0 02/20/2024    ALB 3.8 02/18/2024    ALKPHO 80 02/18/2024    BILT 0.3 02/18/2024    TP 6.4 02/18/2024    AST 15 02/18/2024    ALT <7 (L) 02/18/2024    PTT 46.0 (H) 01/03/2018    PTT 51.9 (H) 01/03/2018    INR 1.1 01/03/2018    T4F 1.2 02/19/2024    TSH 7.166 (H) 02/19/2024    ESRML 24 11/07/2018    MG 2.1 02/20/2024    PHOS 3.6 01/04/2018    TROP 0.00 01/22/2018       XR CHEST AP PORTABLE  (CPT=71045)    Result Date: 2/19/2024  CONCLUSION:   No new focal opacity, pleural effusion, or pneumothorax.  Redemonstrated mild left basilar atelectasis/scarring.  No significant change when compared to the preliminary radiology report from [facility].     Dictated by (CST): Manolo Chang MD on 2/19/2024 at 10:57 AM     Finalized by (CST): Manolo Chang MD on 2/19/2024 at 10:59 AM         EKG 12 Lead    Result Date: 2/20/2024  Sinus rhythm with 1st degree A-V block with Premature atrial complexes Left axis deviation LVH with repolarization abnormality ( R in aVL ) Prolonged QT interval or tu fusion, consider myocardial disease, electrolyte imbalance, or drug effects Abnormal ECG When compared with ECG of 19-FEB-2024 16:57, Premature atrial complexes are now Present  Minimal criteria for Septal infarct are no longer Present    EKG 12 Lead    Result Date: 2/20/2024  Sinus rhythm with marked sinus arrythmia with 1st degree A-V block Left axis deviation LVH with repolarization abnormality ( R in aVL ) Cannot rule out Septal infarct , age undetermined Abnormal ECG When compared with ECG of 18-FEB-2024 22:26, Sinus rhythm has replaced Atrial fibrillation Vent. rate has decreased BY  58 BPM Left bundle branch block is no longer Present Minimal criteria for Septal infarct are now Present    EKG 12 Lead    Result Date: 2/19/2024  Atrial fibrillation with rapid ventricular response Left axis deviation Left bundle branch block Abnormal ECG When compared with ECG of 12-DEC-2023 16:56, Atrial fibrillation has replaced Sinus rhythm Vent. rate has increased BY  58 BPM Left bundle branch block is now Present Criteria for Anterior infarct are no longer Present Confirmed by RUTHY TITUS MATTHEW ([id number]) on 2/19/2024 1:48:14 PM     Imaging: I independently visualized all relevant chest imaging in PACS, agree with radiology interpretation except where noted.    Miguel Angel Lopez MD  [facility]  Cardiac Electrophysiology  2/20/2024  2v

## 2024-02-20 NOTE — PROGRESS NOTES
[facility]    Progress Note    Rachelle Hunter Patient Status:  Observation    1935 MRN [id number]   Location [facility] 1W Attending Gypsy Patrick MD   Hosp Day # 0 PCP Stoney Darnell MD     Chief complaint: CP  Subjective:     Doing much better now, CP resolved  Just had her dinner, no N/V  No fevers    Objective:   Blood pressure 109/57, pulse 60, temperature 98.2 °F (36.8 °C), temperature source Oral, resp. rate 20, height 5' 11\" (1.803 m), weight 176 lb 8 oz (80.1 kg), SpO2 98%.    HEENT: conjunctivae/corneas clear. PERRL, EOM's intact.  Neck: no adenopathy, no carotid bruit, no JVD, supple  Pulmonary:  clear to auscultation bilaterally  Cardiovascular: S1, S2 normal, no murmur, click, rub or gallop, regular rate and rhythm  Abdominal: soft, non-tender; bowel sounds normal; no masses,  no organomegaly  Extremities: no cyanosis or edema  Pulses: palpable and symmetric  Skin: No rashes or lesions  Neurologic: Alert and oriented X 3, normal strength, coordination and gait  Psychiatric: calm, cooperative    Assessment and Plan:   88 year old female with history of CHF, A-fib s/p ablation in , LBBB, CKD 3, DVT who presented to the ED with complaints of intermittent chest pain, shortness of breath and nausea for the last 3 to 4 days.     Paroxysmal atrial fibrillation s/p previous ablation  Chest pain due to above, now resolved  -seen by cardiology  -now s/p CV  -Telemetry  -Continue Xarelto, midodrine prn  -Check thyroid function, ok  -No antiarrhythmics at this time due to hypotension and bradycardia history     CKD 3  -Creatinine at baseline      DVT prophylaxis: AC     Dispo: likely home tomorrow       Chart reviewed, including current vitals, notes, labs and imaging  Pertinent past medical records reviewed  Labs ordered and medications adjusted as outlined above  Coordinate care with care team/consultants  Discussed with patient and family at bedside results of tests,  management plan as outlined above, and the need for ongoing hospitalization  D/w RN     [facility]  severe acute illness/or exacerbation of chronic illness posing a threat to life, IV medications, requiring close monitoring in hospital.       Results:     Lab Results   Component Value Date    WBC 6.7 02/18/2024    HGB 13.8 02/18/2024    HCT 42.9 02/18/2024    .0 02/18/2024    CREATSERUM 1.08 (H) 02/18/2024    BUN 11 02/18/2024     02/18/2024    K 4.1 02/18/2024     02/18/2024    CO2 29.0 02/18/2024     (H) 02/18/2024    CA 8.9 02/18/2024    ALB 3.8 02/18/2024    ALKPHO 80 02/18/2024    BILT 0.3 02/18/2024    TP 6.4 02/18/2024    AST 15 02/18/2024    ALT <7 (L) 02/18/2024    PTT 46.0 (H) 01/03/2018    PTT 51.9 (H) 01/03/2018    INR 1.1 01/03/2018    T4F 1.2 02/19/2024    TSH 7.166 (H) 02/19/2024    ESRML 24 11/07/2018    MG 2.0 02/18/2024    PHOS 3.6 01/04/2018    TROP 0.00 01/22/2018       XR CHEST AP PORTABLE  (CPT=71045)    Result Date: 2/19/2024  CONCLUSION:   No new focal opacity, pleural effusion, or pneumothorax.  Redemonstrated mild left basilar atelectasis/scarring.  No significant change when compared to the preliminary radiology report from [facility].     Dictated by (CST): Manolo Chang MD on 2/19/2024 at 10:57 AM     Finalized by (CST): Manolo Chang MD on 2/19/2024 at 10:59 AM         EKG 12 Lead    Result Date: 2/19/2024  Sinus rhythm with marked sinus arrythmia with 1st degree A-V block Left axis deviation LVH with repolarization abnormality ( R in aVL ) Cannot rule out Septal infarct , age undetermined Abnormal ECG When compared with ECG of 18-FEB-2024 22:26, Sinus rhythm has replaced Atrial fibrillation Vent. rate has decreased BY  58 BPM Left bundle branch block is no longer Present Minimal criteria for Septal infarct are now Present    EKG 12 Lead    Result Date: 2/19/2024  Atrial fibrillation with rapid ventricular response Left axis deviation Left bundle branch  block Abnormal ECG When compared with ECG of 12-DEC-2023 16:56, Atrial fibrillation has replaced Sinus rhythm Vent. rate has increased BY  58 BPM Left bundle branch block is now Present Criteria for Anterior infarct are no longer Present Confirmed by RUTHY TITUS MATTHEW ([id number]) on 2/19/2024 1:48:14 PM       VIDAL CARRANZA MD  2/19/2024

## 2024-02-21 ENCOUNTER — PATIENT OUTREACH (OUTPATIENT)
Dept: CASE MANAGEMENT | Age: 89
End: 2024-02-21

## 2024-02-21 DIAGNOSIS — Z02.9 ENCOUNTERS FOR ADMINISTRATIVE PURPOSE: Primary | ICD-10-CM

## 2024-02-21 DIAGNOSIS — I48.91 ATRIAL FIBRILLATION WITH NORMAL VENTRICULAR RATE (HCC): ICD-10-CM

## 2024-02-21 PROCEDURE — 1111F DSCHRG MED/CURRENT MED MERGE: CPT

## 2024-02-21 NOTE — PROGRESS NOTES
Initial Post Discharge Follow Up   Discharge Date: 2/20/24  Contact Date: 2/21/2024    Consent Verification:  Assessment Completed With: Patient  HIPAA Verified?  Yes    Discharge Dx:     Atrial fibrillation with normal ventricular rate   Preop: Atrial fibrillation  Postop: Atrial fibrillation  Procedure: DC cardioversion    General:   How have you been since your discharge from the hospital?   The patient reports feeling better since being home. Pt states doing well since Cardioversion. Taking the Xarelto. The patient denies dizziness, lightheadedness, syncope/near syncope, palpitations, feeling like her heart is racing. The patient denies SOB, chest pain/ pain radiating from chest to neck, jaw, or shoulders. Denies nausea/vomiting/ diarrhea. The patient reports she has not checked her HR since being home.  She reports ambulating well with no concern.  She reports she has a follow up scheduled with TARA Lopez on 02/28/2024. She reports at that time she will discuss having another Echocardiogram at that time.      Do you have any pain since discharge?  No    How well was your pain managed while in the hospital?   On a scale of 1-5   1- Very Poor and 5- Very well   Very Well  When you were leaving the hospital were your discharge instructions reviewed with you? Yes  How well were your discharge instructions explained to you?   On a scale of 1-5   1- Very Poor and 5- Very well   Very Well  Do you have any questions about your discharge instructions?  No  Before leaving the hospital was your diagnoses explained to you? Yes  Do you have any questions about your diagnoses? No  Are you able to perform normal daily activities of living as you have prior to your hospital stay (dressing, bathing, ambulating to the bathroom, etc)? yes  (NCM) Was patient given a different diet per AVS? yes  If so, which diet?  Cardiac diet   Are there any barriers to following this diet? no    Medications:   Current Outpatient Medications    Medication Sig Dispense Refill    clotrimazole-betamethasone 1-0.05 % External Cream Apply thin layer to AA BID x 7 days 45 g 0    estradiol (ESTRACE) 0.1 MG/GM Vaginal Cream Apply 1/2 gram vaginally 2-3 times per week. 42 g 3    midodrine 5 MG Oral Tab Take 1 tablet (5 mg total) by mouth 2 (two) times daily before meals. 180 tablet 1    clobetasol 0.05 % External Ointment Apply 1 Application topically 2 (two) times daily. Twice daily for one month, then daily for one month, than maintenance twice weekly 60 g 3    latanoprost 0.005 % Ophthalmic Solution INSTILL 1 DROP IN BOTH EYES AT BEDTIME 7.5 mL 3    XARELTO 20 MG Oral Tab Take 1 tablet (20 mg total) by mouth daily with food.      acetaminophen 500 MG Oral Tab Take 1 tablet (500 mg total) by mouth as needed for Pain.      Calcium Carb-Cholecalciferol (CALCIUM CARBONATE-VITAMIN D3 OR) Take 2 tablets by mouth daily. (Patient not taking: Reported on 12/15/2023)      Multiple Vitamin (MULTIVITAMINS) Oral Cap Take 1 capsule by mouth daily.       Were there any changes to your current medication(s) noted on the AVS? Yes    NCM reviewed the following changes with the patient:   Pt states at this time she has stopped taking all of her vitamins.     STOP taking:  ondansetron 4 MG Tbdp (Zofran-ODT)  Vitamin C 500 MG Tabs (VITAMIN C)  ASK how to take:  CALCIUM CARBONATE-VITAMIN D3 OR    She is not currently on rhythm control medication  ASSESSMENT:    1.       History of atrial fibrillation, status post cardioversion December 2021 and again in December 2023.  Not on rhythm control medication due to intolerance  No antiarrhythmics at this time due to hypotension and bradycardia history     If so, were these medication changes discussed with you prior to leaving the hospital? Yes  Let's go over your medications together to make sure we are not missing anything. Medications Reviewed  Are there any reasons that keep you from taking your medication as prescribed? No  Are you  having any concerns with constipation? No  Did patient receive their flu shot (Sept-March)? Yes- NCM confirmed immunization record UTD     Discharge medications reviewed/discussed/and reconciled against outpatient medications with patient.  Any changes or updates to medications sent to PCP.  Patient Acknowledged     Referrals/orders at D/C:  Referrals/orders placed at D/C? no    DME ordered at D/C? No    Discharge orders, AVS reviewed and discussed with patient. Any changes or updates to orders sent to PCP.  Patient Acknowledged      SDOH:     Transportation Needs: No Transportation Needs (2/19/2024)    Transportation Needs     Lack of Transportation: No     Financial Resource Strain: Low Risk  (2/21/2024)    Financial Resource Strain     Difficulty of Paying Living Expenses: Not hard at all     Med Affordability: No         Diagnosis specifics:     What are the symptoms of atrial fibrillation?  AFib may not cause symptoms. If symptoms do occur, they may include:  A fast, pounding, irregular heartbeat  Shortness of breath  Tiredness  Dizziness or fainting  Chest pain  How is atrial fibrillation treated?  Treatments for AFib can include any of the below.  Medicines. You may be prescribed:  Heart rate medicines to help slow down the heartbeat  Heart rhythm medicines to help the heart beat more regularly  Blood thinners or anti-clotting medicines to help reduce the risk for blood clots and stroke  Left atrial appendage closure. Your healthcare provider may advise this to prevent stroke. You may need it if you  are at high risk for stroke but have problems taking blood-thinner (anticoagulant) medicines. This is a procedure  done through a catheter in the groin. A device is placed in the part of the heart. This is where most clots form. This  area is called the left atrial appendage (SHAHBAZ). It's a pouch-like structure in the muscle wall of the left atrium. The  device closes off the SHAHBAZ. This prevents clots moving from  the heart to the brain and causing a stroke.  Electrical cardioversion. Your healthcare provider uses special pads or paddles to send 1 or more brief electrical  shocks to the heart. This can help reset the heartbeat to normal.  Ablation. Long, thin tubes (catheters) are threaded through a blood vessel to the heart. In the heart, the catheters  send out hot or cold energy to the places that cause the abnormal signals. This destroys the problem tissue or  cells. This improves the chances that your heart will stay in normal rhythm without using medicines. If your heart  rate and rhythm can’t be controlled, you may need an AV node ablation and a pacemaker. These will help control  the heart rate and regularity of the heartbeat.  Surgery. Your healthcare provider may use a method to create scar tissue in the parts of the heart causing the  abnormal signals. The scar tissue disrupts the abnormal signals. This may stop AFib from occurring. Most often,  the left atrial appendage is closed off as well.  Hybrid surgical-catheter ablation for AFib. This is used for people with AFib that continues or is hard to treat. It  combines surgery with a catheter ablation. First, the surgeon makes small cuts (incisions) between the ribs in the  chest or in the abdomen near the sternum. The surgeon puts a scope through the incisions. This is done to get to  the backside and other areas of the heart. Energy is sent to the surface of the atria. This disrupts the abnormal  electrical signals. Then a catheter is put into a vein in the groin. The catheter is guided into the heart. Using the  catheter, radiofrequency ablation is done. This destroys any other tissue inside the heart that is causing the AFib.  What are possible complications of atrial fibrillation?  Complications can include:  Blood clots  Stroke  Dementia  Heart failure  When should I call my healthcare provider?  Call your healthcare provider right away if you have any of  these:  Symptoms that don’t get better with treatment, or get worse  New symptoms    Follow up appointments:      Your appointments       Date & Time Appointment Department (Center)    Mar 04, 2024  1:00 PM CST US PELVIS EXPECTED VISUALIZATION with [facility] US RM6 PM [facility] Ultrasound [facility] ([facility])    Please arrive 15 minutes prior to the scheduled appointment time.    Drink 32 ounces of clear liquid, preferably water. Begin drinking approximately 90 minutes prior to your appointment time. Please finish all 32 ounces 60 minutes before your appointment time.    DO NOT VOID/ELIMINATE THE WATER.     Patients having their menstrual cycle may have this exam; if you are wearing a tampon you will need to remove it just before the exam.      Mar 04, 2024  2:00 PM CST CT UROGRAM WITH AND WITHOUT CONTRAST with [facility] CT RM1 [facility] CT [facility] ([facility])    Please arrive 15 minutes prior to your scheduled appointment time.        Mar 06, 2024  9:00 AM CST Urodynamic Study with Marce Baker DO [facility] for Pelvic Medicine [facility] Urogynecology ([facility])    The [facility] building is located at [address] [place] [address].      You may park in the [place] area located next to the [facility].  Also, free  service is available.  Our office is then located on the fourth floor, [address].    Please eat and drink normally before this test and arrive with a comfortably full bladder.      Make sure that you have your pre-op/follow-up appointment scheduled with your doctor after your test is done,  before you leave the office.        Apr 19, 2024  9:00 AM CDT Uro Follow Up Pre/Post Op with Lynette Hernández PA-C [facility] - [facility] Urogynecology ([facility])    The [facility] is located at [address], [place].      You may park in the [place] Parking area located next to the [facility].  Also, free  service is available.  Our office is then located on the fourth floor, [address].    Make sure that you have your pre-op/follow-up appointment scheduled with your doctor after your test is done,  before you leave the office.        Apr 23, 2024  9:00 AM CDT Exam - Established with Yanick Hernandez MD [facility] ([facility])        Nov 05, 2024  9:00 AM CST Medicare Annual Well Visit with Stoney Darnell MD [facility] ([facility])              [facility] CT [facility]  [address] [facility] [address]  [phone] [facility] Ultrasound [facility]  [address] [address]  [phone] [facility]  [address] [facility] [id number] [phone]    [facility]  [address] [facility] [id number] [phone] [facility]  [facility]  [address]  [facility] [address]  [phone]            TCC  Was TCC ordered: No    PCP (If no TCC appointment)  Does patient already have a PCP appointment scheduled? No  NCM Scheduled PCP office TCM appointment with patient   If no appointment scheduled: Explain    Future Appointments   Date Time Provider Department Center   2/26/2024  9:30 AM [facility]   3/4/2024  1:00 PM [facility] US RM6 PM CF US EM [facility]   3/4/2024  2:00 PM [facility] CT RM1 [facility] CT SCAN EM [facility]   3/6/2024  9:00  AM Marce Baker DO [facility]   4/19/2024  9:00 AM Lynette Hernández PA-C [facility]   4/23/2024  9:00 AM Yanick Hernandez MD [facility]   11/5/2024  9:00 AM Stoney Darenll MD [facility]       Specialist    Does the patient have any other follow up appointment(s) needing to be scheduled? No  If yes: NCM reviewed upcoming specialist appointment with patient: Yes- Pt reports appt scheduled for 02/28/24     Follow up with Miguel Angel Lopez in 1  week(s)  Specialty: Cardiac Electrophysiology,  CARDIOLOGY  Office will call you to schedule the  appointment  [facility]  [address]  [address]  [facility] [place] [address]  [phone]  Does the patient need assistance scheduling appointment(s): No    Is there any reason as to why you cannot make your appointment(s)?  No     Needs post D/C:   Now that you are home, are there any needs or concerns you need addressed before your next visit with your PCP?  (DME, meds, questions, etc.): No    Interventions by NCM:   NCM provided education to the patient with a focus on AFIB. NCM advised patient to keep track of HR daily. NCM reviewed s/s to monitor and when to call her Cardiologist. NCM encouraged healthy lifestyle, physical activity, and cardiac diet. TCM appt scheduled with her PCP for 02/26/2024. Cardiologist appointment scheduled for 02/28/2024. All d/c instructions reviewed with pt.  Reviewed when to call MD vs when to go to ER/call 911.  Educated pt on the importance of taking all meds as prescribed as well as close f/u with PCP/specialists.  Pt verbalized understanding and will contact office with any further questions or concerns.       Overall Rating:   How would you rate the care you received while in the hospital? excellent    CCM referral placed:    No

## 2024-02-21 NOTE — PROGRESS NOTES
NCM attempted to reach the patient to complete a TCM/HFU call. Left message to call back. [facility] provided direct contact info at [phone].

## 2024-02-29 ENCOUNTER — NURSE ONLY (OUTPATIENT)
Dept: LAB | Facility: HOSPITAL | Age: 89
End: 2024-02-29
Attending: INTERNAL MEDICINE
Payer: MEDICARE

## 2024-02-29 DIAGNOSIS — Z01.818 PRE-OP TESTING: ICD-10-CM

## 2024-02-29 DIAGNOSIS — I50.22 CHRONIC SYSTOLIC HEART FAILURE (HCC): ICD-10-CM

## 2024-02-29 DIAGNOSIS — I48.0 PAROXYSMAL ATRIAL FIBRILLATION (HCC): Primary | ICD-10-CM

## 2024-02-29 LAB — MRSA DNA SPEC QL NAA+PROBE: NEGATIVE

## 2024-02-29 PROCEDURE — 87641 MR-STAPH DNA AMP PROBE: CPT

## 2024-03-04 ENCOUNTER — APPOINTMENT (OUTPATIENT)
Dept: GENERAL RADIOLOGY | Facility: HOSPITAL | Age: 89
End: 2024-03-04
Attending: INTERNAL MEDICINE
Payer: MEDICARE

## 2024-03-04 ENCOUNTER — HOSPITAL ENCOUNTER (OUTPATIENT)
Dept: INTERVENTIONAL RADIOLOGY/VASCULAR | Facility: HOSPITAL | Age: 89
Discharge: HOME | End: 2024-03-05
Attending: INTERNAL MEDICINE | Admitting: INTERNAL MEDICINE
Payer: MEDICARE

## 2024-03-04 DIAGNOSIS — Z01.818 PRE-OP TESTING: Primary | ICD-10-CM

## 2024-03-04 DIAGNOSIS — I42.9 CARDIOMYOPATHY (HCC): ICD-10-CM

## 2024-03-04 DIAGNOSIS — I48.91 A-FIB (HCC): ICD-10-CM

## 2024-03-04 PROCEDURE — 71045 X-RAY EXAM CHEST 1 VIEW: CPT | Performed by: INTERNAL MEDICINE

## 2024-03-04 PROCEDURE — 0JH607Z INSERTION OF CARDIAC RESYNCHRONIZATION PACEMAKER PULSE GENERATOR INTO CHEST SUBCUTANEOUS TISSUE AND FASCIA, OPEN APPROACH: ICD-10-PCS | Performed by: INTERNAL MEDICINE

## 2024-03-04 PROCEDURE — 99152 MOD SED SAME PHYS/QHP 5/>YRS: CPT | Performed by: INTERNAL MEDICINE

## 2024-03-04 PROCEDURE — 99153 MOD SED SAME PHYS/QHP EA: CPT | Performed by: INTERNAL MEDICINE

## 2024-03-04 PROCEDURE — 02HK3JZ INSERTION OF PACEMAKER LEAD INTO RIGHT VENTRICLE, PERCUTANEOUS APPROACH: ICD-10-PCS | Performed by: INTERNAL MEDICINE

## 2024-03-04 PROCEDURE — 02583ZZ DESTRUCTION OF CONDUCTION MECHANISM, PERCUTANEOUS APPROACH: ICD-10-PCS | Performed by: INTERNAL MEDICINE

## 2024-03-04 PROCEDURE — 33225 L VENTRIC PACING LEAD ADD-ON: CPT | Performed by: INTERNAL MEDICINE

## 2024-03-04 PROCEDURE — 02H43JZ INSERTION OF PACEMAKER LEAD INTO CORONARY VEIN, PERCUTANEOUS APPROACH: ICD-10-PCS | Performed by: INTERNAL MEDICINE

## 2024-03-04 PROCEDURE — 36415 COLL VENOUS BLD VENIPUNCTURE: CPT

## 2024-03-04 PROCEDURE — 93650 ICAR CATH ABLTJ AV NODE FUNC: CPT | Performed by: INTERNAL MEDICINE

## 2024-03-04 PROCEDURE — 33207 INSERT HEART PM VENTRICULAR: CPT | Performed by: INTERNAL MEDICINE

## 2024-03-04 RX ORDER — MIDAZOLAM HYDROCHLORIDE 1 MG/ML
INJECTION INTRAMUSCULAR; INTRAVENOUS
Status: COMPLETED
Start: 2024-03-04 | End: 2024-03-04

## 2024-03-04 RX ORDER — MIDODRINE HYDROCHLORIDE 5 MG/1
5 TABLET ORAL
Status: DISCONTINUED | OUTPATIENT
Start: 2024-03-04 | End: 2024-03-05

## 2024-03-04 RX ORDER — CHLORHEXIDINE GLUCONATE 4 G/100ML
30 SOLUTION TOPICAL
Status: COMPLETED | OUTPATIENT
Start: 2024-03-04 | End: 2024-03-04

## 2024-03-04 RX ORDER — CEFAZOLIN SODIUM/WATER 2 G/20 ML
2 SYRINGE (ML) INTRAVENOUS EVERY 8 HOURS
Qty: 40 ML | Refills: 0 | Status: COMPLETED | OUTPATIENT
Start: 2024-03-04 | End: 2024-03-05

## 2024-03-04 RX ORDER — CEFAZOLIN SODIUM/WATER 2 G/20 ML
SYRINGE (ML) INTRAVENOUS
Status: COMPLETED
Start: 2024-03-04 | End: 2024-03-04

## 2024-03-04 RX ORDER — SODIUM CHLORIDE 9 MG/ML
INJECTION, SOLUTION INTRAVENOUS
Status: COMPLETED | OUTPATIENT
Start: 2024-03-04 | End: 2024-03-04

## 2024-03-04 RX ORDER — LATANOPROST 50 UG/ML
1 SOLUTION/ DROPS OPHTHALMIC NIGHTLY
Status: DISCONTINUED | OUTPATIENT
Start: 2024-03-04 | End: 2024-03-05

## 2024-03-04 RX ORDER — ONDANSETRON 2 MG/ML
4 INJECTION INTRAMUSCULAR; INTRAVENOUS EVERY 6 HOURS PRN
Status: DISCONTINUED | OUTPATIENT
Start: 2024-03-04 | End: 2024-03-05

## 2024-03-04 RX ORDER — LIDOCAINE HYDROCHLORIDE AND EPINEPHRINE 10; 10 MG/ML; UG/ML
INJECTION, SOLUTION INFILTRATION; PERINEURAL
Status: COMPLETED
Start: 2024-03-04 | End: 2024-03-04

## 2024-03-04 RX ADMIN — MIDODRINE HYDROCHLORIDE 5 MG: 5 TABLET ORAL at 17:51:00

## 2024-03-04 RX ADMIN — SODIUM CHLORIDE: 9 INJECTION, SOLUTION INTRAVENOUS at 09:00:00

## 2024-03-04 RX ADMIN — CHLORHEXIDINE GLUCONATE 30 ML: 4 SOLUTION TOPICAL at 09:00:00

## 2024-03-04 RX ADMIN — LATANOPROST 1 DROP: 50 SOLUTION/ DROPS OPHTHALMIC at 21:43:00

## 2024-03-04 RX ADMIN — CEFAZOLIN SODIUM/WATER 2 G: 2 G/20 ML SYRINGE (ML) INTRAVENOUS at 17:52:00

## 2024-03-04 NOTE — IVS NOTE
Hand-Off     Procedure hand off report given to Linda CATHERINE.   Pt's vital signs are stable.   Procedural access site is dry and intact with no signs or symptoms of bleeding or hematoma. Left arm sling in place.   Dr. Lopez spoke with patient/family post procedure.

## 2024-03-04 NOTE — PLAN OF CARE
Patient admitted from cath lab, phone report received from DORENE Telles. Patient and family oriented to room and call light. Patient educated on sling and bedrest orders. Plans for discharge home when cleared by cardiology.     Problem: Patient Centered Care  Goal: Patient preferences are identified and integrated in the patient's plan of care  Description: Interventions:  - What would you like us to know as we care for you? From home alone.  - Provide timely, complete, and accurate information to patient/family  - Incorporate patient and family knowledge, values, beliefs, and cultural backgrounds into the planning and delivery of care  - Encourage patient/family to participate in care and decision-making at the level they choose  - Honor patient and family perspectives and choices  Outcome: Progressing     Problem: Patient/Family Goals  Goal: Patient/Family Long Term Goal  Description: Patient's Long Term Goal: To return home.    Interventions:  - medication and monitoring  - See additional Care Plan goals for specific interventions  Outcome: Progressing  Goal: Patient/Family Short Term Goal  Description: Patient's Short Term Goal: To eat dinner.    Interventions:   - See additional Care Plan goals for specific interventions  Outcome: Progressing

## 2024-03-04 NOTE — PROCEDURES
OPERATION(S) PERFORMED:   1. Biv pacemaker implant; new RV and LV leads   2. Conscious sedation   3. Ablation RFA of AV node    : Miguel Angel Lopez MD    INDICATION: Uncontrolled Afib rates, intolerance to medicines. 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 Patient was assessed and monitoring of oxygen, heart rate and blood pressure by nurse and myself during the exam from 1022 to 1131.    COMPLICATIONS: None     ESTIMATED BLOOD LOSS: Minimal.    METHODS: The patient was brought to the outpatient cardiac telemetry unit in a fasting and nonsedated state after providing informed consent. IV sedation was administered during continuous ECG, pulse oximeter, and noninvasive hemodynamic monitoring. After administering 1% lidocaine for local anesthesia, an incision was made parallel to the left deltopectoral groove. The plane of the incision was extended to the prepectoral fascia. A pocket was formed.  Access to the cephalic vein was achieved via cutdown and it was isolated with stay sutures. Venotomy was performed and a sheath was placed over a J wire which was advanced to the IVC. The RV lead was placed in the RV apex. Local electrograms and pacing thresholds were measured. The sheath was removed with a retained J wire in place. Another sheath was placed over this wire, 9F.    A Blazer 4mm ablation catheter was placed in the heart.  Baseline measurements were recorded. At the conclusion of the procedure, catheters was removed.    EP study findings:  Atrial fibrillation with ventricular rates less than 600 ms.     HV 55 ms.    Ablation: Using a 5mm EPT RFA catheter, RF lesions were applied to the area of the AV node where a His bundle recording was present and below/posterior. RF energy resulted in AV block.     Pacer interrogation was performed. Thresholds and  impedance measurements were stable. RV lead stable <1 V @ 0.5ms, stable ohms. Final programmed rate was VVIR 80 ppm.     LV lead: A retained wire was then used for the 9F biv sheath. Using an extended hook guide catheter, along with an EP diagnostic catheter, access to the CS was achieved. This was confirmed by performing a venogram. There was a moderate sized lateral branch for LV lead placement.The LV  lead was advanced over a guide wire to the distal most position of this branch. The guide catheter was then slit.       The pocket was irrigated with antibiotic solution. Bleeding was sought for until hemostasis was achieved. The leads were sutured to the pectoralis muscle over the suture collar. The cephalic vein was sutured proximally and distally with the stay sutures. The leads were connected to a pulse generator. They were coiled and placed into the pocket along with the pulse generator. The incision was closed in 2 layers using 3-0 and 4-0 V-lock vicryl. Steri-Strips were applied followed by a sterile dressing. The left arm was placed in a sling and the patient was transported to telemetry in stable condition. There were no apparent intraoperative complications.     MEASURED DATA: RV and LV lead impedence, sensing and thresholds all stable.    CONCLUSIONS:   1. Status post successful implant of a Biv-chamber pacemaker with a active fixation right ventricular RV lead and LV lead. [place]   2. Adequate RV and LV pacing and sensing thresholds.    3. Status post successful AV node RFA.   4. Base rate for V pacing was programmed to 80.   5. Conscious sedation    Miguel Angel Lopez MD  [facility]  Cardiac Electrophysiology  3/4/2024

## 2024-03-04 NOTE — INTERVAL H&P NOTE
Pre-op Diagnosis: * No pre-op diagnosis entered *    The above referenced H&P was reviewed by Miguel Angel oLpez MD on 3/4/2024, the patient was examined and no significant changes have occurred in the patient's condition since the H&P was performed.  I discussed with the patient and/or legal representative the potential benefits, risks and side effects of this procedure; the likelihood of the patient achieving goals; and potential problems that might occur during recuperation.  I discussed reasonable alternatives to the procedure, including risks, benefits and side effects related to the alternatives and risks related to not receiving this procedure.  We will proceed with procedure as planned.

## 2024-03-04 NOTE — DISCHARGE INSTRUCTIONS
[facility] PERMANENT PACEMAKER/ ICD INSERTION    Activity    Plan to rest tonight and tomorrow.  Avoid drinking alcohol for next 24 hours.  Do not drive after procedure until 1-week device check appointment, unless otherwise instructed by your cardiologist.  Wear sling for next 24 hours, then only at night as needed for 30 days to help assist with arm restrictions while sleeping.    Arm Restrictions For 30 days after implant:    Do NOT lift arm with implanted device above your head or behind your back. Arm is to remain below your shoulder and in front of your body.  Do NOT lift more than 10 lbs with affected arm  Do NOT perform repetitive cycling motion with affected arm (swimming, golfing, bowling, tennis, exercise machines, raking, vacuuming, snow shoveling).    Incision Care/Bathing    Keep incision site completely dry for 5 days after surgery. After day 5, you can remove top dressing and shower, letting clean soapy water run over incision area, patting dry.  The white steri-strips placed directly over the incision will gradually fall off over the next few weeks and can be physically removed after 3 weeks. Do not take them off before this time.   Keep procedure site clean and dry, do not apply any ointments, creams, lotions to the incision.  Look at your incision daily to monitor for signs and symptoms of infection (redness, swelling, drainage, foul odor from procedure site)  Do not cover incision with extra dressings or gauze unless otherwise instructed.  Do not submerge incision in water, take whirlpool baths or swim for 30 days or until site is completely healed.    When to notify your physician:    If you have chest pain, shortness of breath or persistent cough  If you experience any signs of fever (temperature >101), chills, infection (redness, swelling, thick yellow drainage, foul order from procedure site)  New or worsening swelling of arm with implanted device  If an ICD was inserted and you receive a shock and  have no symptoms, call [facility] device clinic. If you have symptoms (fainting, near fainting, dizziness, lightheadedness, chest pain, shortness of breath, palpitations), call 911.    [facility] Direct Line: [phone] [Monday-Friday. 8:30AM-4PM]    [facility] Main Office: [phone] [Monday- Friday 8AM-5PM]

## 2024-03-05 ENCOUNTER — APPOINTMENT (OUTPATIENT)
Dept: GENERAL RADIOLOGY | Facility: HOSPITAL | Age: 89
End: 2024-03-05
Attending: INTERNAL MEDICINE
Payer: MEDICARE

## 2024-03-05 VITALS
RESPIRATION RATE: 17 BRPM | TEMPERATURE: 97 F | OXYGEN SATURATION: 93 % | HEIGHT: 72 IN | WEIGHT: 186.63 LBS | HEART RATE: 80 BPM | SYSTOLIC BLOOD PRESSURE: 134 MMHG | BODY MASS INDEX: 25.28 KG/M2 | DIASTOLIC BLOOD PRESSURE: 84 MMHG

## 2024-03-05 PROCEDURE — 71046 X-RAY EXAM CHEST 2 VIEWS: CPT | Performed by: INTERNAL MEDICINE

## 2024-03-05 RX ADMIN — CEFAZOLIN SODIUM/WATER 2 G: 2 G/20 ML SYRINGE (ML) INTRAVENOUS at 03:04:00

## 2024-03-05 RX ADMIN — ONDANSETRON 4 MG: 2 INJECTION INTRAMUSCULAR; INTRAVENOUS at 06:25:00

## 2024-03-05 NOTE — CM/SW NOTE
03/05/24 1000   Discharge disposition   Expected discharge disposition Home or Self   Post Acute Care Provider Home   Home services after discharge None   Discharge transportation Private car     The patient received a MDO for discharge.     The patient will be transported home via private car.    The patient has no questions or concerns at this time.    SW/CM to remain available for support and/or discharge planning.     Dominique Ying MSW, LSW  Discharge Planner [id number]

## 2024-03-05 NOTE — PROGRESS NOTES
Progress Note  Rachelle DE LA TORRE Young Patient Status:  Observation    1935 MRN [id number]   Location [facility] 3W/SW Attending Miguel Angel Lopez MD   Hosp Day # 0 PCP Stoney Darnell MD     Subjective:  C/O tenderness at [facility] site. Otherwise doing well and looking forward to going home.     Objective:  /84 (BP Location: Right arm)   Pulse 80   Temp 97.3 °F (36.3 °C) (Oral)   Resp 17   Ht 6' 4\" (1.93 m)   Wt 186 lb 9.6 oz (84.6 kg)   SpO2 93%   BMI 22.71 kg/m²     Telemetry: Vpaced, 80bpm    Intake/Output:    Intake/Output Summary (Last 24 hours) at 3/5/2024 0915  Last data filed at 3/5/2024 0700  Gross per 24 hour   Intake 260 ml   Output 0 ml   Net 260 ml       Last 3 Weights   24 0700 186 lb 9.6 oz (84.6 kg)   24 1545 176 lb (79.8 kg)   24 0102 176 lb 8 oz (80.1 kg)   24 2230 175 lb (79.4 kg)   24 1037 174 lb (78.9 kg)       Labs:  No results for input(s): \"GLU\", \"BUN\", \"CREATSERUM\", \"GFRAA\", \"GFRNAA\", \"EGFRCR\", \"CA\", \"NA\", \"K\", \"CL\", \"CO2\" in the last 168 hours.  No results for input(s): \"RBC\", \"HGB\", \"HCT\", \"MCV\", \"MCH\", \"MCHC\", \"RDW\", \"NEPRELIM\", \"WBC\", \"PLT\" in the last 168 hours.      No results for input(s): \"TROP\", \"TROPHS\", \"CK\" in the last 168 hours.    Diagnostics:  XR CHEST AP PORTABLE  (CPT=71045)    Result Date: 3/4/2024  CONCLUSION:  1. New biventricular leads.  No pneumothorax.  Heart size upper limits of normal to mildly prominent, the pulmonary vascularity is normal.    Dictated by (CST): Jef Montes MD on 3/04/2024 at 1:07 PM     Finalized by (CST): Jef Montes MD on 3/04/2024 at 1:07 PM          Review of Systems   Cardiovascular: Negative.    Respiratory: Negative.         Physical Exam:    Gen: alert, oriented x 3, NAD  Heent: pupils equal, reactive. Mucous membranes moist.   Neck: no jvd  Cardiac: regular rate and rhythm, normal S1,S2, no murmur, clicks, rub or gallop  Lungs: CTA  Abd: soft, NT/ND +bs  Ext: no edema  Skin: Warm, dry, L  chest PPM site with mild ecchymosis, dressing CDI  Neuro: No focal deficits    Medications:     latanoprost  1 drop Both Eyes Nightly    midodrine  5 mg Oral BID AC       Assessment:  Paroxysmal/Persistent Symptomatic Atrial Fibrillation, LBBB - now S/P BiV PPM 3/4 ([employer])  CXR without pneumothorax  PPM interrogation stable  [id number] 5: Xarelto  Chronic HFrEF, Pulm HTN  2/2024 TTE: LVEF 30-35%, mild biatrial dilation, mild MR  Appears compensated on exam  Has historically not been able to tolerate BB d/t hypotension/bradycardia  Hx Orthostatic Hypotension  's today  On midodrine at home   Hx DVT - on Xarelto    Plan:  May resume Xarelto today.  Post PPM Instructions discussed, including site care with keeping dressing dry, intact for 5 days. May shower on day 5. Arm/shoulder restrictions for 3 months with one month of wearing sling overnight while sleeping.   Pt to follow up in the [facility] device clinic in one week and visit with Dr Lopez in 4 weeks.   Pt is stable for discharge today from cardiology standpoint. Given BB intolerance in the past, we can revisit HF medication regimen as an outpatient.    Plan of care discussed with patient, RN.    LE Hurd  3/5/2024  9:15 AM  [phone] [facility]  [phone] [facility]

## 2024-03-05 NOTE — PLAN OF CARE
Pacemaker site clean/dry/intact. Zofran given for nausea once. Plan for chest x-ray today then discharge home.    Problem: Patient Centered Care  Goal: Patient preferences are identified and integrated in the patient's plan of care  Description: Interventions:  - What would you like us to know as we care for you? From home alone  - Provide timely, complete, and accurate information to patient/family  - Incorporate patient and family knowledge, values, beliefs, and cultural backgrounds into the planning and delivery of care  - Encourage patient/family to participate in care and decision-making at the level they choose  - Honor patient and family perspectives and choices  Outcome: Progressing     Problem: Patient/Family Goals  Goal: Patient/Family Long Term Goal  Description: Patient's Long Term Goal: go home    Interventions:  - complete am chest xray  - antibiotics  - See additional Care Plan goals for specific interventions  Outcome: Progressing  Goal: Patient/Family Short Term Goal  Description: Patient's Short Term Goal: Manage nausea    Interventions:   - zofran as needed  - See additional Care Plan goals for specific interventions  Outcome: Progressing

## 2024-03-05 NOTE — PLAN OF CARE
Patient discharged. Writer gave patient discharge instructions, pt verbalized understanding. Patient in stable condition.  Problem: Patient Centered Care  Goal: Patient preferences are identified and integrated in the patient's plan of care  Description: Interventions:  - What would you like us to know as we care for you? From home alone  - Provide timely, complete, and accurate information to patient/family  - Incorporate patient and family knowledge, values, beliefs, and cultural backgrounds into the planning and delivery of care  - Encourage patient/family to participate in care and decision-making at the level they choose  - Honor patient and family perspectives and choices  Outcome: Adequate for Discharge     Problem: Patient/Family Goals  Goal: Patient/Family Long Term Goal  Description: Patient's Long Term Goal: go home    Interventions:  - complete am chest xray  - antibiotics  - See additional Care Plan goals for specific interventions  Outcome: Adequate for Discharge  Goal: Patient/Family Short Term Goal  Description: Patient's Short Term Goal: Manage nausea    Interventions:   - zofran as needed  - See additional Care Plan goals for specific interventions  Outcome: Adequate for Discharge

## 2024-03-06 ENCOUNTER — PATIENT OUTREACH (OUTPATIENT)
Dept: CASE MANAGEMENT | Age: 89
End: 2024-03-06

## 2024-03-06 ENCOUNTER — TELEPHONE (OUTPATIENT)
Dept: INTERNAL MEDICINE CLINIC | Facility: CLINIC | Age: 89
End: 2024-03-06

## 2024-03-06 DIAGNOSIS — Z02.9 ENCOUNTERS FOR ADMINISTRATIVE PURPOSE: Primary | ICD-10-CM

## 2024-03-06 DIAGNOSIS — I48.0 PAROXYSMAL ATRIAL FIBRILLATION (HCC): ICD-10-CM

## 2024-03-06 PROCEDURE — 1111F DSCHRG MED/CURRENT MED MERGE: CPT

## 2024-03-06 NOTE — PROGRESS NOTES
Initial Post Discharge Follow Up   Discharge Date: 3/5/24  Contact Date: 3/6/2024    Consent Verification:  Assessment Completed With: Patient  HIPAA Verified?  Yes    Discharge Dx:     Paroxysmal/Persistent Symptomatic Atrial Fibrillation, LBBB - now S/P BiV PPM 3/4       General:   How have you been since your discharge from the hospital?   Pt states doing well. The patient does admit she was very sore yesterday around the pacemaker incision area. The patient states she rested most of the day yesterday. The patient states upon waking up today she feels improved. The patient reports no pain at rest today and only pain with movement. She reports wearing her sling. She reports dressing intact and is clean and dry. She denies arm swelling. The patient denies fever, nausea, vomiting, diarrhea. The patient denies dizziness, lightheadedness, syncope/near syncope, palpitations. The patient denies chest pain/ pain radiating from chest to neck, jaw, or shoulders. Denies SOB.       Do you have any pain since discharge?  Yes  Where: around the incision site    Rating on pain scale 1-10, 10 being the worst pain you have ever experienced, what is current pain: 5  Alleviating factors: resting  Aggravating factors: certain movements   Is the pain manageable at home? Yes  How well was your pain managed while in the hospital?   On a scale of 1-5   1- Very Poor and 5- Very well   Very Well  When you were leaving the hospital were your discharge instructions reviewed with you? Yes  How well were your discharge instructions explained to you?   On a scale of 1-5   1- Very Poor and 5- Very well   Very Well  Do you have any questions about your discharge instructions?  No  Before leaving the hospital was your diagnoses explained to you? Yes  Do you have any questions about your diagnoses? No  Are you able to perform normal daily activities of living as you have prior to your hospital stay (dressing, bathing, ambulating to the bathroom,  etc)? yes  (NCM) Was patient given a different diet per AVS? yes  If so, which diet?  Cardiac diet   Are there any barriers to following this diet? no    Medications:   Current Outpatient Medications   Medication Sig Dispense Refill    estradiol (ESTRACE) 0.1 MG/GM Vaginal Cream Apply 1/2 gram vaginally 2-3 times per week. 42 g 3    midodrine 5 MG Oral Tab Take 1 tablet (5 mg total) by mouth 2 (two) times daily before meals. 180 tablet 1    latanoprost 0.005 % Ophthalmic Solution INSTILL 1 DROP IN BOTH EYES AT BEDTIME 7.5 mL 3    XARELTO 20 MG Oral Tab Take 1 tablet (20 mg total) by mouth daily with food.       Were there any changes to your current medication(s) noted on the AVS? Yes  STOP taking:  clobetasol 0.05 % Oint (Temovate)  clotrimazole-betamethasone 1-0.05 % Crea (Lotrisone)    If so, were these medication changes discussed with you prior to leaving the hospital? Yes  Let's go over your medications together to make sure we are not missing anything. Medications Reviewed  Are there any reasons that keep you from taking your medication as prescribed? No  Did patient receive their flu shot (Sept-March)? Yes    Discharge medications reviewed/discussed/and reconciled against outpatient medications with patient.  Any changes or updates to medications sent to PCP.  Patient Acknowledged     Referrals/orders at D/C:  Referrals/orders placed at D/C? no    DME ordered at D/C? No      Discharge orders, AVS reviewed and discussed with patient. Any changes or updates to orders sent to PCP.  Patient Acknowledged      SDOH:   Transportation Needs: No Transportation Needs (3/4/2024)    Transportation Needs     Lack of Transportation: No     Financial Resource Strain: Low Risk  (2/21/2024)    Financial Resource Strain     Difficulty of Paying Living Expenses: Not hard at all     Med Affordability: No       Diagnosis specifics:     [facility] PERMANENT PACEMAKER/ ICD INSERTION  Activity  Plan to rest tonight and tomorrow.  Avoid  drinking alcohol for next 24 hours.  Do not drive after procedure until 1-week device check appointment,  unless otherwise instructed by your cardiologist.  Wear sling for next 24 hours, then only at night as needed for 30  days to help assist with arm restrictions while sleeping.  Arm Restrictions For 30 days after implant:  Do NOT lift arm with implanted device above your head or behind  your back. Arm is to remain below your shoulder and in front of your  body.  Do NOT lift more than 10 lbs with affected arm  Do NOT perform repetitive cycling motion with affected arm  (swimming, golfing, bowling, tennis, exercise machines, raking,  vacuuming, snow shoveling).  Incision Care/Bathing  Keep incision site completely dry for 5 days after surgery. After day  5, you can remove top dressing and shower, letting clean soapy  water run over incision area, patting dry.  The white steri-strips placed directly over the incision will gradually  fall off over the next few weeks and can be physically removed after  3 weeks. Do not take them off before this time  Keep procedure site clean and dry, do not apply any ointments, creams, lotions to the incision.  Look at your incision daily to monitor for signs and symptoms of infection (redness, swelling, drainage, foul odor  from procedure site)  Do not cover incision with extra dressings or gauze unless otherwise instructed.  Do not submerge incision in water, take whirlpool baths or swim for 30 days or until site is completely healed.  When to notify your physician:  If you have chest pain, shortness of breath or persistent cough  If you experience any signs of fever (temperature >101), chills, infection (redness, swelling, thick yellow drainage, foul  order from procedure site)  New or worsening swelling of arm with implanted device  If an ICD was inserted and you receive a shock and have no symptoms, call [facility] device clinic. If you have symptoms  (fainting, near fainting,  dizziness, lightheadedness, chest pain, shortness of breath, palpitations), call 911.  [facility] Device Clinic Direct Line: [phone] [Monday-Friday. 8:30AM-4PM]  [facility] Main Office: [phone] [Monday- Friday 8AM-5PM]    Follow up appointments:      Your appointments       Date & Time Appointment Department ([place])    Mar 15, 2024 10:00 AM CDT US PELVIS EXPECTED VISUALIZATION with [facility] US RM6 PM [facility] Ultrasound [facility] ([facility])    Please arrive 15 minutes prior to the scheduled appointment time.    Drink 32 ounces of clear liquid, preferably water. Begin drinking approximately 90 minutes prior to your appointment time. Please finish all 32 ounces 60 minutes before your appointment time.    DO NOT VOID/ELIMINATE THE WATER.     Patients having their menstrual cycle may have this exam; if you are wearing a tampon you will need to remove it just before the exam.      Mar 15, 2024 11:00 AM CDT CT UROGRAM WITH AND WITHOUT CONTRAST with [facility] CT RM1 [facility] CT [facility] ([facility])    Please arrive 15 minutes prior to your scheduled appointment time.        Apr 05, 2024  9:00 AM CDT Urodynamic Study with Marce Baker DO [facility] Urogynecology ([facility])    The [facility] building is located at [address] [place] [address].      You may park in the Yellow Parking area located next to the [facility].  Also, free  service is available.  Our office is then located on the fourth floor, [address].    Please eat and drink normally before this test and arrive with a comfortably full bladder.      Make sure that you have your pre-op/follow-up appointment scheduled with your doctor after your test is done,  before you leave the office.        Apr 19, 2024  9:00 AM CDT Uro Follow Up Pre/Post Op with  Lynette Hernández PA-C [facility] Urogynecology ([facility])    The [facility] is located at [address] [place], [place].      You may park in the Yellow Parking area located next to the [facility].  Also, free  service is available.  Our office is then located on the fourth floor, [address].    Make sure that you have your pre-op/follow-up appointment scheduled with your doctor after your test is done,  before you leave the office.        Apr 23, 2024  9:00 AM CDT Exam - Established with Yanick Hernandez MD [facility] ([facility])        Nov 05, 2024  9:00 AM CST Medicare Annual Well Visit with Stoney Darnell MD [facility] ([facility])              [facility] CT [facility]  [address] [facility] [address]  [phone] [facility] Ultrasound [facility]  [address] [facility] [address]  [phone] [facility]  [address] [facility] [id number] [phone]    [facility]  [address] [facility] [id number] [phone] [facility] Urogynecology  [facility]  [address] [facility] [address]  [facility] [address]  [phone]            TCC  Was TCC ordered: No    PCP (If no TCC appointment)  Does patient already have a PCP appointment scheduled? No  NCM Attempted to schedule PCP office TCM appointment with patient   If no appointment scheduled: Explain  Patient declined- she prefers to only follow up with her Cardiologist. WIL DALAL  has been placed to the PCP office as an FYI/per TCM protocol.     Specialist    Does the patient have any other follow up appointment(s) needing to be scheduled? No  If yes: NCM reviewed upcoming specialist appointment with patient: Yes    Go to [facility]  Tuesday Mar 12, 2024  For wound re-check/ device check with device nurse at 3PM  [address]  [facility] [address]  [phone]  Does the patient need assistance scheduling appointment(s): No    Is there any reason as to why you cannot make your appointment(s)?  No     Needs post D/C:   Now that you are home, are there any needs or concerns you need addressed before your next visit with your PCP?  (DME, meds, questions, etc.): No    Interventions by NCM:   NCM reviewed PPM Discharge instructions with patient- Arm/shoulder restrictions, site care, reviewed s/s to monitor and when to contact the [facility] Device Clinic. The patient verbalized understanding and had no questions or concerns. TCM appt declined- TE sent to PCP office as an FYI/per TCM protocol. All d/c instructions reviewed with pt.  Reviewed when to call MD vs when to go to ER/call 911.  Educated pt on the importance of taking all meds as prescribed as well as close f/u with PCP/specialists.  Pt verbalized understanding and will contact office with any further questions or concerns.       Overall Rating:   How would you rate the care you received while in the hospital? excellent    CCM referral placed:    No

## 2024-03-06 NOTE — TELEPHONE ENCOUNTER
Spoke to patient for TCM today.  Patient does not have an appointment scheduled at this time.  [facility] attempted to schedule this appointment however patient declined- she prefers to only follow up with her Cardiologist at [facility].     BOOK BY DATE (last date for TCM): 03/19/2024    Clinical staff:  CLAUDIA- patient declined to schedule TCM appointment.  Thank you!

## 2024-03-15 RX ORDER — MIDODRINE HYDROCHLORIDE 5 MG/1
5 TABLET ORAL
Qty: 180 TABLET | Refills: 3 | Status: SHIPPED | OUTPATIENT
Start: 2024-03-15

## 2024-03-15 NOTE — TELEPHONE ENCOUNTER
Protocol Failed/ No Protocol    Requested Prescriptions   Pending Prescriptions Disp Refills    MIDODRINE 5 MG Oral Tab [Pharmacy Med Name: Midodrine Hcl 5 Mg Tab [facility]] 180 tablet 0     Sig: Take 1 tablet by mouth 2 (two) times daily before meals.       There is no refill protocol information for this order          Future Appointments         Provider Department Appt Notes    In 3 weeks [facility] US RM6 PM [facility] Ultrasound [facility]     In 3 weeks [facility] CT RM1 [facility] CT [facility]     In 4 weeks Marce Baker DO [facility] Urogynecology cysto    In 1 month Lynette Hernández PA-C [facility] [place] [facility] Medicine [facility] Urogynecology SKIN CHECK    In 1 month Yanick Hernandez MD [facility] EP/ VF, OCT and EE    In 7 months Stoney Darnell MD [facility] AWV last 11/14/23          Recent Outpatient Visits              2 weeks ago Paroxysmal atrial fibrillation (HCC)    [facility]    Nurse Only    1 month ago Vaginal discharge    [facility] Urogynecology Jacy Gonzales PA    Office Visit    3 months ago Postmenopausal atrophic vaginitis    [facility] Urogynecology Marce Baker DO    Office Visit    4 months ago Annual physical exam    [facility] Stoney Darnell MD    Office Visit    4 months ago Postmenopausal atrophic vaginitis    [facility] Urogynecology Marce Baker DO    Office Visit

## 2024-04-08 ENCOUNTER — HOSPITAL ENCOUNTER (OUTPATIENT)
Dept: ULTRASOUND IMAGING | Facility: HOSPITAL | Age: 89
Discharge: HOME | End: 2024-04-08
Attending: PHYSICIAN ASSISTANT
Payer: MEDICARE

## 2024-04-08 ENCOUNTER — HOSPITAL ENCOUNTER (OUTPATIENT)
Dept: CT IMAGING | Facility: HOSPITAL | Age: 89
Discharge: HOME | End: 2024-04-08
Attending: PHYSICIAN ASSISTANT
Payer: MEDICARE

## 2024-04-08 DIAGNOSIS — R93.89 THICKENED ENDOMETRIUM: ICD-10-CM

## 2024-04-08 DIAGNOSIS — R93.429 ABNORMAL RENAL ULTRASOUND: ICD-10-CM

## 2024-04-08 LAB
CREAT BLD-MCNC: 0.9 MG/DL
EGFRCR SERPLBLD CKD-EPI 2021: 62 ML/MIN/1.73M2 (ref 60–?)

## 2024-04-08 PROCEDURE — 76377 3D RENDER W/INTRP POSTPROCES: CPT | Performed by: PHYSICIAN ASSISTANT

## 2024-04-08 PROCEDURE — 82565 ASSAY OF CREATININE: CPT

## 2024-04-08 PROCEDURE — 74178 CT ABD&PLV WO CNTR FLWD CNTR: CPT | Performed by: PHYSICIAN ASSISTANT

## 2024-04-08 PROCEDURE — 76856 US EXAM PELVIC COMPLETE: CPT | Performed by: PHYSICIAN ASSISTANT

## 2024-04-08 PROCEDURE — 76830 TRANSVAGINAL US NON-OB: CPT | Performed by: PHYSICIAN ASSISTANT

## 2024-04-09 ENCOUNTER — TELEPHONE (OUTPATIENT)
Dept: UROLOGY | Facility: HOSPITAL | Age: 89
End: 2024-04-09

## 2024-04-09 PROBLEM — K80.20 CHOLELITHIASIS: Status: ACTIVE | Noted: 2024-04-09

## 2024-04-09 NOTE — TELEPHONE ENCOUNTER
TC to patient to discuss recent imaging results:    CT urogram 4/8/24 shows:  - 4 mm nonobstructing stone in R kidney, no hydro, proceed with cysto as planned  - 6 mm pulmonary nodule in left lower lobe -- rec f/u with PCP to discuss need for further imaging  - chronic thoracic compression fx with 50% loss of height  - 24 mm gallbladder stone -- rec f/u with PCP  - enlarged heart & coronary artery dz -- pt follows with cardiology  - endometrial thickening (see below)    -- note sent to PCP (Dr. Darnell) re: above incidental findings    Pelvic US 4/8/24 shows:  - collective endometrial thickness of 5.7 mm    Pt denies vaginal bleeding    Discussed options for further eval of thickened endometrium including in office EMBx vs HSC with D&C, possible polypectomy    Will send message to Celina for surgery scheduling    Plan to f/u with Dr. Baker 4/12/24 as planned for cystoscopy    All questions answered

## 2024-04-12 ENCOUNTER — TELEPHONE (OUTPATIENT)
Dept: SURGERY | Facility: CLINIC | Age: 89
End: 2024-04-12

## 2024-04-12 ENCOUNTER — OFFICE VISIT (OUTPATIENT)
Dept: UROLOGY | Facility: HOSPITAL | Age: 89
End: 2024-04-12
Attending: OBSTETRICS & GYNECOLOGY
Payer: MEDICARE

## 2024-04-12 VITALS — RESPIRATION RATE: 18 BRPM | HEIGHT: 72 IN | BODY MASS INDEX: 25.19 KG/M2 | WEIGHT: 186 LBS

## 2024-04-12 DIAGNOSIS — R93.89 THICKENED ENDOMETRIUM: ICD-10-CM

## 2024-04-12 DIAGNOSIS — N32.9 LESION OF URINARY BLADDER: ICD-10-CM

## 2024-04-12 DIAGNOSIS — N20.0 KIDNEY STONES: ICD-10-CM

## 2024-04-12 DIAGNOSIS — N95.2 POSTMENOPAUSAL ATROPHIC VAGINITIS: ICD-10-CM

## 2024-04-12 DIAGNOSIS — N90.4 VULVAR DYSTROPHY: ICD-10-CM

## 2024-04-12 DIAGNOSIS — R31.29 MICROSCOPIC HEMATURIA: Primary | ICD-10-CM

## 2024-04-12 LAB
CONTROL RUN WITHIN 24 HOURS?: YES
LEUKOCYTE ESTERASE URINE: NEGATIVE
NITRITE URINE: NEGATIVE

## 2024-04-12 PROCEDURE — 88108 CYTOPATH CONCENTRATE TECH: CPT | Performed by: OBSTETRICS & GYNECOLOGY

## 2024-04-12 PROCEDURE — 81002 URINALYSIS NONAUTO W/O SCOPE: CPT | Performed by: OBSTETRICS & GYNECOLOGY

## 2024-04-12 PROCEDURE — 52000 CYSTOURETHROSCOPY: CPT

## 2024-04-12 PROCEDURE — 99212 OFFICE O/P EST SF 10 MIN: CPT

## 2024-04-12 RX ORDER — LIDOCAINE HYDROCHLORIDE 20 MG/ML
10 JELLY TOPICAL ONCE
Status: SHIPPED | OUTPATIENT
Start: 2024-04-12

## 2024-04-12 NOTE — PROGRESS NOTES
[facility] at  [facility]  [address]  [facility] [address]  PH: [phone]  FAX: [phone]     Date Patient MRN   2024 Rachelle Hunter   [address]  [facility] [address]  [id number]      Dr. Marce Baker, DO     Patient Name:  Rachelle Tillman : 1935 Patient Age: 88 year old   Referring Physician:  No ref. provider found    Vitals   Resp 18   Ht 76\"   Wt 186 lb (84.4 kg)   BMI 22.64 kg/m²       Medications   Outpatient Encounter Medications as of 2024   Medication Sig Dispense Refill    midodrine 5 MG Oral Tab Take 1 tablet (5 mg total) by mouth 2 (two) times daily before meals. 180 tablet 3    estradiol (ESTRACE) 0.1 MG/GM Vaginal Cream Apply 1/2 gram vaginally 2-3 times per week. 42 g 3    latanoprost 0.005 % Ophthalmic Solution INSTILL 1 DROP IN BOTH EYES AT BEDTIME 7.5 mL 3    XARELTO 20 MG Oral Tab Take 1 tablet (20 mg total) by mouth daily with food.       No facility-administered encounter medications on file as of 2024.         Allergies   Allergies were reviewed with positive findings listed below:  Sulfa antibiotics      Chief Complaint   Chief Complaint   Patient presents with    Cystourethroscopy Procedure     Microscopic hematuria        URINALYSIS:  Leukocyte esterase urine   Date Value Ref Range Status   2024 Negative Negative Final     Nitrite Urine   Date Value Ref Range Status   2024 Negative Negative Final     Blood Urine   Date Value Ref Range Status   2024 Moderate (A) Negative Final         Procedure        PRE-OP DIAGNOSIS: micro hematuria    POST-OP DIAGNOSIS: same    ANESTHESIA: 2% Lidocaine gel    PROCEDURES:  After sterile prep with Betadine the urethral lumen was prepped via catheter injection of approx. 3 mL 2% Lidocaine gel.  The urethra was gently dilated to accommodate the cystoscope.  Infusion of sterile water via the  cystoscope was performed to achieve a comfortable bladder distension to allow a full view.  A careful, systemic assessment of the complete lumen of the bladder and urethra was performed.    SPECIMENS: urine cytology    DESCRIPTION Bladder Trigone UV  Junction URETHRA       Proximal Mild Distal   Negative/NL  x x x x x   Squamous Metaplasia         Polyps         Diverticulum         Other (exudate, cysts) X papillary lesions noted along R UO          Bladder Trabeculation: mild    Mirco hematuria  Renal ultrasound show stones, CT urogram stones (rec urology eval)  Cytology pending, follow results  Cysto with bladder lesion, d/w pt. Urology for evaluation & biopsy      Thickened endometrium noted on renal us, pelvic ultrasound -->  Moderate amount of complex appearing fluid in the endometrial canal with an overall diameter of 1.8 cm.  There is internal debris and this could suggest complex fluid or blood.  The endometrium itself is minimally prominent with   1 component measuring 3.2 mm, and the other measuring 2.5 mm with a collective measurement of 5.7 mm   Needs endometrial eval, d/w pt    Urology to evaluation bladder lesion and review kidney stone mgmt    Patient presents to follow up vulvar issues    She is currently using clobetasol & vag estrogen    She reports +improvement   No recent UTIs   Bowels improved    Resp 18   Ht 76\"   Wt 186 lb (84.4 kg)   BMI 22.64 kg/m²     GEN: NAD  CV: RRR  Pulm: nl effort  Abd: soft    PELVIC EXAM:  Ext. Gen: +atrophy, no lesions, lichenification at union of labial minora/majora (improved)  Urethra: +atrophy, nontender  Bladder:+fullness, nontender  Vagina: +atrophy, vag discharge  Cervix: no bleeding, no lesions, nontender  Uterus: +mobile  Adnexa:no masses, nontender  Perineum: nontender, some lichenification  Anus: wnl  Rectum: defer     PELVIS FLOOR NEUROMUSCULAR FUNCTION:  Strength:  1 and Unable to hold greater than 3 sec  Perineal Sensation:  Normal        PELVIC  SUPPORT:  [place]:  1  Ant:  1  Post:  1  CST:  negative  UVJ: somewhat hypermobile    Impression/Plan:    ICD-10-CM    1. Microscopic hematuria  R31.29 POCT urinalysis dipstick[85870]     Bladder Wash (Urine for Cytology)      2. 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 Postmenopausal atrophic vaginitis  N95.2       5. Kidney stones  N20.0       6. Lesion of urinary bladder  N32.9           Discussion Items:   Thickened endometrium - needs evaluation  Reviewed options, plan for HSC D&C with poss polypectomy     Discussed management options for vulvar dystrophy. Discussed chronic nature of symptoms. Discussed steroid treatments and vulvar care, discussed associated treatment risks and benefits.   Discussed need for future vulvar biopsy if sx persist despite mgmt as prescribed.   Cont clobetasol as rx'd    Discussed mgmt of vulvovaginal atrophy with vaginal estrogen cream. Reviewed associated benefits, risks, alternatives, and goals. Recommend low dose twice weekly mgmt   Cont vag estrogen twice weekly     All questions answered  She understands and agrees to plan    Return for post op.    Marce Baker, DO, FACOG, FACS

## 2024-04-15 ENCOUNTER — TELEPHONE (OUTPATIENT)
Dept: SURGERY | Facility: CLINIC | Age: 89
End: 2024-04-15

## 2024-04-15 ENCOUNTER — TELEPHONE (OUTPATIENT)
Dept: UROLOGY | Facility: HOSPITAL | Age: 89
End: 2024-04-15

## 2024-04-15 LAB — NON GYNE INTERPRETATION: NEGATIVE

## 2024-04-15 NOTE — TELEPHONE ENCOUNTER
I called the patient and asked her to reschedule her appt on 4/16 from 9:30 am to 11:15 am. Patient verbalized her understanding. Appt changed.

## 2024-04-15 NOTE — TELEPHONE ENCOUNTER
TC to Lindsey to and left detailed message.  Message stated that Lynette Hunter reviewed her urine cytology and it was negative for high-grade urothelial carcinoma.  Lynette Hunter requesting that patient does  keep follow up with Urology.  Office number provided if patient has any questions or concerns.

## 2024-04-16 ENCOUNTER — OFFICE VISIT (OUTPATIENT)
Dept: SURGERY | Facility: CLINIC | Age: 89
End: 2024-04-16
Payer: MEDICARE

## 2024-04-16 ENCOUNTER — TELEPHONE (OUTPATIENT)
Dept: SURGERY | Facility: CLINIC | Age: 89
End: 2024-04-16

## 2024-04-16 DIAGNOSIS — D49.4 BLADDER TUMOR: Primary | ICD-10-CM

## 2024-04-16 DIAGNOSIS — N20.0 NEPHROLITHIASIS: Primary | ICD-10-CM

## 2024-04-16 DIAGNOSIS — R31.9 HEMATURIA, UNSPECIFIED TYPE: ICD-10-CM

## 2024-04-16 PROCEDURE — 99204 OFFICE O/P NEW MOD 45 MIN: CPT | Performed by: UROLOGY

## 2024-04-16 NOTE — TELEPHONE ENCOUNTER
Hi!  This patient needs a transurethral resection of bladder tumor. Should be booked for 1 hours in the next 1-4 weeks. Needs labs as ordered below. Needs cardiac/pcp clearance and instructions on holding xarelto 2-3 days.    Thanks!  AR    Urology Surgery Scheduling Request    Location: [facility]    Surgeon: WIL Lundberg MD    Asst. Surgeon: N/A    Diagnosis: Bladder tumor    Procedure: Cystoscopy transurethral resection of bladder tumor     Anesthesia: General     Time Frame: 1-2 weeks    Time needed: 1 hour    Special Equipment: Resectoscope    On Call to OR: Anc     Admission: Day Surgery    Pre-op Testing: CBC, CMP, PT/INR and Urine Culture     Need Pre-op Clearance: cardiac/pcp    Estimated Post Op/Follow Up Appt: tbd.    Lina Lundberg MD

## 2024-04-16 NOTE — PROGRESS NOTES
Lina Lundberg MD  Department of Urology  [address]  [place] [address]    T: [phone]  F: [phone]    To: Stoney Darnell MD   [address] [facility] [address]    Re: Rachelle Hunter   MRN: [id number]  : 1935    Dear Stoney Darnell MD,    Today I had the pleasure of seeing Rachelle Hunter in my clinic. As you know, Ms. Hunter is a pleasant 88 year old year old female who I am seeing for ?bladder lesiosn and kidney stones. Patient was last seen in this department on Visit date not found.    Briefly, patient had a CTU and cystoscopy with Dr. Baker. Bladder lesions were reportedly noted around the right UO/trigone. CTU with 3-4mm right renal stone.  Patient has had what sounds like ESWL in the past.    She is on Xarelto.    Please note that we do not have any imaging from the cystoscopy and I do not know size, number or location of bladder lesions.       PAST MEDICAL HISTORY:  Past Medical History:    Acute systolic CHF (congestive heart failure) ([facility])    Cardiac cath 1-18 with no obstructive CAD; Echo 1-18 with EF 35-40% ;Lexiscan GXT 6-22 with mild LVE, normal perfusion, EF 62%    Aortic atherosclerosis ([facility])    CXR 12-17    BCC (basal cell carcinoma)    right tip of nose    Cholelithiasis    CT scan 4-24    Chronic kidney disease, stage 3 ([facility])    Glaucoma    2012- START Latanoprost qhs OS, based on OCT    Herpes zoster    Right leg    Kidney stones    Treated with lithotripsy    Left bundle branch block    Left leg DVT ([facility])    Partially occlusive thrombus left proximal femoral vein    LV dysfunction    Echo 1-18 with EF 35-40%; cardiac cath 1-18 with EF 40% and no obstructive CAD; Echo 7-20 with EF 40-45%, mild MR, mild TR    Mitral regurgitation    Echo 1-18 with moderate-severe MR and TR; JAI 1-18 with moderate-severe MR    Orthostatic hypotension    Osteopenia    T score -1.3 hip and -0.7 lumbar spine    Paroxysmal atrial fibrillation ([facility])    Nuclear  treadmill stress test 7-15 normal with EF 57%; symptomatic recurrence 1-18 s/p cardioversion    Pulmonary hypertension ([facility])    Echo 1-18 with peak pulmonary artery pressure 44 mmHg        PAST SURGICAL HISTORY:  Past Surgical History:   Procedure Laterality Date    Appendectomy  July 2000    Ruptured appendicitis    Cardioversion  01/2018    Symptomatic atrial fibrillation    Cataract extraction w/  intraocular lens implant Right 4/13/17    Dr. Rogel @ [facility]    Cataract extraction w/  intraocular lens implant Left 06/2017    Dr. Rogel     Lithotripsy Left June 2001    Other  July 2011    ORIF distal right radius fracture    Tubal ligation  1976         ALLERGIES:  Allergies   Allergen Reactions    Sulfa Antibiotics HIVES         MEDICATIONS:  Current Outpatient Medications   Medication Instructions    estradiol (ESTRACE) 0.1 MG/GM Vaginal Cream Apply 1/2 gram vaginally 2-3 times per week.    latanoprost 0.005 % Ophthalmic Solution INSTILL 1 DROP IN BOTH EYES AT BEDTIME    midodrine (PROAMATINE) 5 mg, Oral, 2 times daily before meals    Xarelto 20 mg, Oral, Daily with food        FAMILY HISTORY:  Family History   Problem Relation Age of Onset    Diabetes Father     Other (Pancreatic Cancer) Father     Other (Renal Cell Carcinoma) Brother     Crohn's Disease Brother     Glaucoma Mother     Glaucoma Paternal Aunt     Macular degeneration Paternal Aunt         SOCIAL HISTORY:  Social History     Socioeconomic History    Marital status:    Tobacco Use    Smoking status: Never    Smokeless tobacco: Never   Vaping Use    Vaping status: Never Used   Substance and Sexual Activity    Alcohol use: Not Currently     Alcohol/week: 0.8 standard drinks of alcohol     Types: 1 Glasses of wine per week    Drug use: No   Other Topics Concern    Caffeine Concern Yes     Comment: 24oz soda daily    Pt has a pacemaker No    Pt has a defibrillator No    Reaction to local anesthetic No     Social Determinants of Health      Financial Resource Strain: Low Risk  (2/21/2024)    Financial Resource Strain     Difficulty of Paying Living Expenses: Not hard at all     Med Affordability: No   Food Insecurity: No Food Insecurity (3/4/2024)    Food Insecurity     Food Insecurity: Never true   Transportation Needs: No Transportation Needs (3/4/2024)    Transportation Needs     Lack of Transportation: No   Housing Stability: Low Risk  (3/4/2024)    Housing Stability     Housing Instability: No          PHYSICAL EXAMINATION:  There were no vitals filed for this visit.  CONSTITUTIONAL: No apparent distress, cooperative and communicative  NEUROLOGIC: Alert and oriented   HEAD: Normocephalic, atraumatic   EYES: Sclera non-icteric   ENT: Hearing intact, moist mucous membranes   NECK: No obvious goiter or masses   RESPIRATORY: Normal respiratory effort, Nonlabored breathing on room air  SKIN: No evident rashes   ABDOMEN: Soft, nontender, nondistended, no rebound tenderness, no guarding, no masses      REVIEW OF SYSTEMS:    A comprehensive 10-point review of systems was completed.  Pertinent positives and negatives are noted in the the HPI.       LABORATORY DATA:  General Categorization     Benign, inflammatory, reactive or reparative changes   Final Diagnosis:      Urine:  Adequacy: Satisfactory for evaluation  General Category: Negative for high-grade urothelial carcinoma ([facility])  Diagnosis:  Urothelial cells present  Squamous cells present  Red blood cells present  Neutrophils present     ISTAT Creatinine  0.55 - 1.02 mg/dL 0.90   Comment: This test may exhibit falsely elevated results when a sample is collected from a patient who is presently taking Hydroxyurea. If patient is consuming Hydroxyurea, i-STAT creatinine analysis should be considered inaccurate and a sample should be referred to the Central Lab for analysis, if clinically indicated.   eGFR-Cr  >=60 mL/min/1.73m2 62   Resulting Agency [facility] ([facility])                      Ref Range & Units 2/18/24 10:38 PM   WBC  4.0 - 11.0 x10(3) uL 6.7   RBC  3.80 - 5.30 x10(6)uL 4.60   HGB  12.0 - 16.0 g/dL 13.8   HCT  35.0 - 48.0 % 42.9   MCV  80.0 - 100.0 fL 93.3   MCH  26.0 - 34.0 pg 30.0   MCHC  31.0 - 37.0 g/dL 32.2   RDW-SD  35.1 - 46.3 fL 48.5 High    RDW  11.0 - 15.0 % 13.8   PLT  150.0 - 450.0 10(3)uL 224.0           IMAGING REVIEW:  PROCEDURE: CT UROGRAM(W+WO) W/3D SH(CPT=74178/18683)     COMPARISON: None.     INDICATIONS: Evaluate known nonobstructing 5mm right midpole renal calculus and multiple left cysts.     TECHNIQUE:   CT images were created of the abdomen and pelvis without and with intravenous non-ionic contrast followed by multiplane 3d MIP imaging and 3D volumetric imaging of delays were performed for a CT urogram.  Dose reduction techniques were used.     Dose information is transmitted to the ACR (American College of Radiology) NRDR (National Radiology Data Registry) which includes the Dose Index Registry.     3-D RENDERING:Three dimensional image processing was completed using a separate workstation under concurrent supervision. Images were archived.     FINDINGS:  KIDNEYS: 4 mm nonobstructing stone in a mid pole calyx of the right kidney.  Scattered subcentimeter cortical based low density foci within the kidneys measuring up to 7 mm in diameter. Kidneys demonstrate symmetric size, enhancement, and excretion  without hydronephrosis or enhancing mass lesion.  ADRENALS: No nodule or enlargement.  URINARY BLADDER: The urinary bladder is distended with urine and excreted IV contrast material without wall thickening or stones.  LIVER: No enlargement, atrophy, abnormal density, or significant focal lesion.    BILIARY: 24 mm stone within the gallbladder.  Additional smaller calculi are also present in the gallbladder.  Gallbladder is incompletely distended.  No significant intra or extrahepatic biliary ductal dilatation.  PANCREAS: No lesion, fluid collection, ductal  dilatation, or atrophy.    SPLEEN: No enlargement or focal lesion.    AORTA/VASCULAR: No aortic aneurysm or dissection.  RETROPERITONEUM: No mass or adenopathy.    BOWEL/MESENTERY: There is no small or large bowel obstruction. The terminal ileum is within normal limits. The appendix is not identified and there is history of appendectomy. No inflammatory changes around the cecum. There is scattered sigmoid colonic  diverticulosis scattered diverticula in the distal ileum.. There is no free air, free fluid, or lymphadenopathy in the abdomen or pelvis.  ABDOMINAL WALL: Tiny fat containing umbilical hernia.  No suspicious mass lesion.  PELVIC NODES: No adenopathy.    PELVIC ORGANS: Anteverted uterus.  Endometrium is prominent measuring 17 mm in diameter.  Normal CT appearance of the ovaries.  BONES: Moderate endplate change and disc disease throughout the spine.  Moderate probable chronic compression deformity at T9 which has lost approximately 50% of normal height.  No retropulsion or suspicious bone lesion at this level.  LUNG BASES: The visualized heart is enlarged.  There are coronary artery calcifications and cardiac pacing electrodes are present.  Dependent subsegmental atelectasis in the lung bases.  6 mm diameter left lower lobe pulmonary nodule.  It is on series 5,   image 12.  OTHER: Negative.                 Impression   CONCLUSION:  1.  4 mm nonobstructing right renal stone.  No additional calculi in the kidneys, ureters, or bladder.  No hydroureteronephrosis.  Subcentimeter probable simple renal cysts are present.  2.  24 mm diameter stone in the gallbladder.  No evidence of acute cholecystitis or biliary ductal dilatation.  3.  Post appendectomy.  Scattered diverticulosis in the distal ileum and large bowel.  No bowel obstruction or inflammation.  4.  Endometrium is thickened at 17 mm.  Correlate with pelvic ultrasound performed 4/8/24 but dictated separately.  5.  Chronic T9 compression deformed which has  lost approximately 50% of normal height.  6.  Cardiomegaly.  Coronary atherosclerosis.  7.  6 mm left lower lobe pulmonary nodule.  Recommend follow-up CT chest to assess for additional nodules.          OTHER RELEVANT DATA:   none     IMPRESSION: 1. Report of bladder lesions-recommended or TURBT.  I mention that as I do not know location, size or number of bladder lesion it is hard for me to  her on how long the procedure will be and how extensive the procedure will be.  I mentioned that there is a possibility she would have a catheter after the surgery.  There is a chance she may have a stent.    I discussed that transurethral resection of a bladder tumor is performed in the operating room.  We would use electrocautery to resect the abnormal mass.  He may have a catheter in place after the surgery for about 7 days.  The catheter would be removed in the clinic after that point. I did discuss that in some situations we would keep the patient overnight for observation.  I reviewed the risks of the procedure including bleeding, severe bleeding causing clot retention and requiring take back to the operating room, infection, damage to surrounding structures (urethra, prostate, bladder, ureteral orifices), possible need for stent if the ureteral orifice were to be resected, need for additional procedures, anesthesia complications (cardio pulmonary complications), sepsis, inability to urinate/persistent urinary retention.      Patient would follow-up with me in clinic to discuss the pathology results and possibly remove his catheter if it is placed in about 1 week.  The patient knows that they may get the pathology results prior to my review, however understands that I will discuss these results in person.    2.  3 to 4 mm nonobstructing right renal stone without any symptoms currently-offered patient observation versus definitive ureteroscopy versus ESWL.  She opted for observation.    I mentioned that I likely  would not treat the stone at the same time as the TURBT as I am unsure the location of tumor and would not want to risk any seeding of the upper tract if truly malignant lesion.    We discussed ureteroscopy including how it is performed and associated risks. I reviewed risks including bleeding, infection, damage to surrounding structures (urethra, bladder, ureter, kidney), inability to treat the stone and need for stent alone, need for additional/multiple procedures, need for prolonged stent, need for nephrostomy tube, stent colic/discomfort (extreme flank pain, bladder discomfort/spasms, urinary urgency and frequency, hematuria), ureteral stricture, ureteral avulsion requiring open surgery, sepsis, anesthesia complications (cardiorespiratory complications).     I also counseled patient that NSAIDs and blood thinning agents need to be stopped appropriately prior to surgery. Discussed return precautions including fevers, chills, nausea, vomiting, intractable pain. Stent colic including flank pain, urgency, frequency and blood in the urine is common. Patient agreed with the plan and understood the above.         PLAN:  OR: Cystoscopy, TURBT  CBC, Chem-7, INR, urine culture  Will need preop clearance from her cardiologist and instructions on holding Xarelto  Patient wants to observe stone and if she has any fevers, chills she knows to go to the emergency room associated with flank pain    Thank you for referring this very pleasant patient to my clinic. If you have any questions or concerns, please do not hesitate to contact me.    Sincerely,  Lina Lundberg MD    30 minutes were spent on this patient at this visit obtaining a history, reviewing medical records, developing a treatment plan, counseling and discussing treatment strategy with patient, coordination of care and documentation.     The 21st Century Cures Act makes medical notes available to patients in the interest of transparency.  However, please be  advised that this is a medical document.  It is intended as a peer to peer communication.  It is written in medical language and may contain abbreviations or verbiage that are technical and unfamiliar.  It may appear blunt or direct.  Medical documents are intended to carry relevant information, facts as evident, and the clinical opinion of the practitioner.

## 2024-04-16 NOTE — TELEPHONE ENCOUNTER
Spoke with patient scheduled Cystoscopy transurethral resection of bladder tumor, Thursday 05/16/2024, went over pre-op/lab instruction, patient will review I my chart, informed to please have labs done 10 days prior to scheduled surgery.     Clearance faxed to ' requesting Needs cardiac/pcp clearance and instructions on holding xarelto 2-3 days.     I will await recommendations.

## 2024-04-19 ENCOUNTER — OFFICE VISIT (OUTPATIENT)
Dept: UROLOGY | Facility: HOSPITAL | Age: 89
End: 2024-04-19
Attending: PHYSICIAN ASSISTANT
Payer: MEDICARE

## 2024-04-19 VITALS — HEIGHT: 71 IN | RESPIRATION RATE: 20 BRPM | WEIGHT: 186 LBS | BODY MASS INDEX: 26.04 KG/M2

## 2024-04-19 DIAGNOSIS — N90.4 VULVAR DYSTROPHY: Primary | ICD-10-CM

## 2024-04-19 DIAGNOSIS — N95.2 POSTMENOPAUSAL ATROPHIC VAGINITIS: ICD-10-CM

## 2024-04-19 DIAGNOSIS — R93.89 THICKENED ENDOMETRIUM: ICD-10-CM

## 2024-04-19 PROCEDURE — 99212 OFFICE O/P EST SF 10 MIN: CPT

## 2024-04-19 RX ORDER — CLOBETASOL PROPIONATE 0.5 MG/G
1 OINTMENT TOPICAL
Qty: 60 G | Refills: 3 | Status: SHIPPED | OUTPATIENT
Start: 2024-04-19

## 2024-04-19 NOTE — PROGRESS NOTES
Patient presents to follow up skin check    She is currently using clobetasol twice a week  Using vaginal estrogen cream twice a week  Using fiber and miralax, bowels regular  Denies s/sx of UTI  Undergoing workup with urology for bladder lesion, scheduled for cystoscopy with transurethral resection of bladder tumor on 5/16/24  Pelvic ultrasound shows thickened endometrium of 5.7mm, scheduled for hysteroscopy with D&C with 5/6/24    Urogynecology Summary:  Urogynecology Summary  Prolapse: No  JAG: Yes  Urge Incontinence: Yes (reports more bothersome)  Nocturia Frequency: 2 (2-3x)  Frequency: 2 hours  Incomplete emptying: No  Constipation: No (reports taking fiber and Miralax qd)  Wears pad day?: 1 (at times)  Activities are limited by UI/POP?: No  Currently Sexually Active: No    Resp 20   Ht 71\"   Wt 186 lb (84.4 kg)   BMI 25.94 kg/m²     GENERAL:  NAD  HEAD: NC/AT  EYES: symmetric bilaterally. No icterus. Sclera w/o injection  NECK: no masses  LUNGS:  Normal resp effort  ABDOMEN:  Soft, no mass  MUSK: normal gait, ROM wnl. No edema  PSYCH: A&Ox3. Recent and remote memory intact    PELVIC EXAM:  Ext. 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 Reviewed associated benefits, risks, alternatives, and goals. Recommend low dose twice weekly mgmt   Discussed management options for vulvar dystrophy. Discussed chronic nature of symptoms. Discussed steroid treatments and vulvar care, discussed  associated treatment risks and benefits.   Discussed need for future vulvar biopsy if sx persist despite mgmt as prescribed.       Treatment Plan, Non-surgical:   Continue clobetasol as prescribed  Continue vaginal estrogen cream twice weekly  Bladder diet/drill  Bowel regimen reviewed  Call with s/sx of UTI  All questions answered  She understands and agrees to plan    Follow up with Dr. Baker post op, sooner prn    Lynette Hernández PA-C

## 2024-04-23 ENCOUNTER — OFFICE VISIT (OUTPATIENT)
Dept: OPHTHALMOLOGY | Facility: CLINIC | Age: 89
End: 2024-04-23
Payer: MEDICARE

## 2024-04-23 DIAGNOSIS — H43.393 VITREOUS FLOATERS OF BOTH EYES: ICD-10-CM

## 2024-04-23 DIAGNOSIS — Z96.1 PSEUDOPHAKIA OF BOTH EYES: Primary | ICD-10-CM

## 2024-04-23 DIAGNOSIS — H40.1132 PRIMARY OPEN ANGLE GLAUCOMA OF BOTH EYES, MODERATE STAGE: ICD-10-CM

## 2024-04-23 DIAGNOSIS — H26.493 AFTER-CATARACT OBSCURING VISION, BILATERAL: ICD-10-CM

## 2024-04-23 PROCEDURE — 92083 EXTENDED VISUAL FIELD XM: CPT | Performed by: OPHTHALMOLOGY

## 2024-04-23 PROCEDURE — 92133 CPTRZD OPH DX IMG PST SGM ON: CPT | Performed by: OPHTHALMOLOGY

## 2024-04-23 PROCEDURE — 92014 COMPRE OPH EXAM EST PT 1/>: CPT | Performed by: OPHTHALMOLOGY

## 2024-04-23 NOTE — PROGRESS NOTES
Rachelle Hunter is a 88 year old female.    HPI:     HPI    Patient is here for a VF/OCT and complete eye exam. Patient is taking Latanoprost in both eyes at bedtime as directed. Pt states vision is stable with OTC readers only and denies any ocular issues.   Last edited by Mena Richards OT on 4/23/2024 10:14 AM.        Patient History:  Past Medical History:    Acute systolic CHF (congestive heart failure) ([facility])    Cardiac cath 1-18 with no obstructive CAD; Echo 1-18 with EF 35-40% ;Lexiscan GXT 6-22 with mild LVE, normal perfusion, EF 62%    Aortic atherosclerosis ([facility])    CXR 12-17    BCC (basal cell carcinoma)    right tip of nose    Cholelithiasis    CT scan 4-24    Chronic kidney disease, stage 3 ([facility])    Glaucoma    2012- START Latanoprost qhs OS, based on OCT    Herpes zoster    Right leg    Kidney stones    Treated with lithotripsy    Left bundle branch block    Left leg DVT ([facility])    Partially occlusive thrombus left proximal femoral vein    LV dysfunction    Echo 1-18 with EF 35-40%; cardiac cath 1-18 with EF 40% and no obstructive CAD; Echo 7-20 with EF 40-45%, mild MR, mild TR    Mitral regurgitation    Echo 1-18 with moderate-severe MR and TR; JAI 1-18 with moderate-severe MR    Orthostatic hypotension    Osteopenia    T score -1.3 hip and -0.7 lumbar spine    Paroxysmal atrial fibrillation ([facility])    Nuclear treadmill stress test 7-15 normal with EF 57%; symptomatic recurrence 1-18 s/p cardioversion    Pulmonary hypertension ([facility])    Echo 1-18 with peak pulmonary artery pressure 44 mmHg       Surgical History: Rachelle Hunter has a past surgical history that includes tubal ligation (1976); appendectomy (July 2000) (Ruptured appendicitis); lithotripsy (Left, June 2001); other (July 2011) (ORIF distal right radius fracture); Cataract extraction w/  intraocular lens implant (Right, 4/13/17) (Dr. Rogel @ [facility]); Cataract extraction w/  intraocular lens implant (Left, 06/2017) (Dr. Rogel ); and  cardioversion (01/2018) (Symptomatic atrial fibrillation).    Family History   Problem Relation Age of Onset    Diabetes Father     Other (Pancreatic Cancer) Father     Other (Renal Cell Carcinoma) Brother     Crohn's Disease Brother     Glaucoma Mother     Glaucoma Paternal Aunt     Macular degeneration Paternal Aunt        Social History:   Social History     Socioeconomic History    Marital status:    Tobacco Use    Smoking status: Never    Smokeless tobacco: Never   Vaping Use    Vaping status: Never Used   Substance and Sexual Activity    Alcohol use: Not Currently     Alcohol/week: 0.8 standard drinks of alcohol     Types: 1 Glasses of wine per week    Drug use: No   Other Topics Concern    Caffeine Concern Yes     Comment: 24oz soda daily    Pt has a pacemaker No    Pt has a defibrillator No    Reaction to local anesthetic No     Social Determinants of Health     Financial Resource Strain: Low Risk  (2/21/2024)    Financial Resource Strain     Difficulty of Paying Living Expenses: Not hard at all     Med Affordability: No   Food Insecurity: No Food Insecurity (3/4/2024)    Food Insecurity     Food Insecurity: Never true   Transportation Needs: No Transportation Needs (3/4/2024)    Transportation Needs     Lack of Transportation: No   Housing Stability: Low Risk  (3/4/2024)    Housing Stability     Housing Instability: No       Medications:  Current Outpatient Medications   Medication Sig Dispense Refill    clobetasol 0.05 % External Ointment Apply 1 Application topically twice a week. Twice weekly for maintenance, if experiencing flare: increase usage to 2x/daily for 2 weeks followed by daily for 2 weeks, then back to maintenance 2x/week 60 g 3    midodrine 5 MG Oral Tab Take 1 tablet (5 mg total) by mouth 2 (two) times daily before meals. 180 tablet 3    estradiol (ESTRACE) 0.1 MG/GM Vaginal Cream Apply 1/2 gram vaginally 2-3 times per week. 42 g 3    latanoprost 0.005 % Ophthalmic Solution INSTILL 1  DROP IN BOTH EYES AT BEDTIME 7.5 mL 3    XARELTO 20 MG Oral Tab Take 1 tablet (20 mg total) by mouth daily with food.         Allergies:  Allergies   Allergen Reactions    Sulfa Antibiotics HIVES       ROS:       PHYSICAL EXAM:     Base Eye Exam       Visual Acuity (Snellen - Linear)         Right Left    Dist sc 20/40 -2 20/30 +2    Dist ph sc 20/30     Near cc 20/30 20/30   NVA checked with +2.50 in phoropter              Tonometry (Applanation, 9:56 AM)         Right Left    Pressure 17 17              Pachymetry (6/12/07)         Right Left    Thickness 520/+1 522/+1              Pupils         Pupils    Right PERRL    Left PERRL              Visual Fields         Left Right     Full Full              Extraocular Movement         Right Left     Full, Ortho Full, Ortho              Neuro/Psych       Oriented x3: Yes              Dilation       Both eyes: 1.0% Mydriacyl and 2.5% William Synephrine @ 9:58 AM                  Slit Lamp and Fundus Exam       Slit Lamp Exam         Right Left    Lids/Lashes Dermatochalasis, Meibomian gland dysfunction Meibomian gland dysfunction, Dermatochalasis    Conjunctiva/Sclera Normal Normal    Cornea MDF superiorly MDF superiorly    Anterior Chamber Deep and quiet Deep and quiet    Iris Normal Normal    Lens PC IOL with  2+ PC opacity  PC IOL with 2+ PC opacity     Vitreous Vitreous floaters Vitreous floaters              Fundus Exam         Right Left    Disc Sloping margin, Peripapillary atrophy Sloping margin, Peripapillary atrophy    C/D Ratio 0.9 0.8    Macula Normal Normal    Vessels Normal Normal    Periphery Normal Normal                  Refraction       Wearing Rx         Sphere Cylinder    Right +2.50 Sphere    Left +2.50 Sphere      Type: Forgot OTC reading only              Manifest Refraction    Pt declines refraction, happy with OTC readers only   NVA checked with +3.00 trial lens   Right eye: 20/20, left eye: 20/20   Recommend +3.00 OTC readers                        ASSESSMENT/PLAN:     Diagnoses and Plan:     Primary open angle glaucoma of both eyes, moderate stage  Visual field and OCT completed in office today with stable results that were discussed with patient in office today.      Discussed with patient Intraocular pressure stable, tolerating medications.  Will continue same regimen of Latanoprost in both eyes at bedtime.     Will have patient back in 4 months for pressure check.      Pseudophakia of both eyes  No treatment.   Suggest +3.00  over the counter glasses for reading.    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  She can call the office to schedule a right or left YAG laser in the future if she notices decreased vision in either eye.      Vitreous floaters of both eyes   There is no evidence of retinal pathology.  All signs and symptoms of retinal detachment/tears explained in detail.    Patient instructed to call the office if they experience increase in floaters, increase in flashes of light, loss of vision or curtain or veil effect.       No orders of the defined types were placed in this encounter.      Meds This Visit:  Requested Prescriptions      No prescriptions requested or ordered in this encounter        Follow up instructions:  Return in about 4 months (around 8/23/2024) for IOP check.    4/23/2024  Scribed by: Yanick Hernandez MD

## 2024-04-23 NOTE — PATIENT INSTRUCTIONS
Primary open angle glaucoma of both eyes, moderate stage  Visual field and OCT completed in office today with stable results that were discussed with patient in office today.      Discussed with patient Intraocular pressure stable, tolerating medications.  Will continue same regimen of Latanoprost in both eyes at bedtime.     Will have patient back in 4 months for pressure check.      Pseudophakia of both eyes  No treatment.   Suggest +3.00  over the counter glasses for reading.    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  She can call the office to schedule a right or left YAG laser in the future if she notices decreased vision in either eye.      Vitreous floaters of both eyes   There is no evidence of retinal pathology.  All signs and symptoms of retinal detachment/tears explained in detail.    Patient instructed to call the office if they experience increase in floaters, increase in flashes of light, loss of vision or curtain or veil effect.

## 2024-04-23 NOTE — ASSESSMENT & PLAN NOTE
Visual field and OCT completed in office today with stable results that were discussed with patient in office today.      Discussed with patient Intraocular pressure stable, tolerating medications.  Will continue same regimen of Latanoprost in both eyes at bedtime.     Will have patient back in 4 months for pressure check.

## 2024-04-24 ENCOUNTER — OFFICE VISIT (OUTPATIENT)
Dept: INTERNAL MEDICINE CLINIC | Facility: CLINIC | Age: 89
End: 2024-04-24
Payer: MEDICARE

## 2024-04-24 VITALS
SYSTOLIC BLOOD PRESSURE: 132 MMHG | WEIGHT: 182.63 LBS | DIASTOLIC BLOOD PRESSURE: 84 MMHG | BODY MASS INDEX: 25.57 KG/M2 | HEIGHT: 71 IN | HEART RATE: 91 BPM

## 2024-04-24 DIAGNOSIS — I50.22 CHRONIC SYSTOLIC HEART FAILURE (HCC): ICD-10-CM

## 2024-04-24 DIAGNOSIS — I48.0 PAROXYSMAL ATRIAL FIBRILLATION (HCC): ICD-10-CM

## 2024-04-24 DIAGNOSIS — N18.30 STAGE 3 CHRONIC KIDNEY DISEASE, UNSPECIFIED WHETHER STAGE 3A OR 3B CKD (HCC): ICD-10-CM

## 2024-04-24 DIAGNOSIS — Z01.818 PREOPERATIVE EXAMINATION: Primary | ICD-10-CM

## 2024-04-24 DIAGNOSIS — D49.4 BLADDER TUMOR: ICD-10-CM

## 2024-04-24 PROCEDURE — 99215 OFFICE O/P EST HI 40 MIN: CPT | Performed by: INTERNAL MEDICINE

## 2024-04-24 NOTE — PATIENT INSTRUCTIONS
Pending the results of your preoperative testing and visit with Dr. Lopez, you are stable for your scheduled surgeries  Please schedule a Medicare physical with me in November  Xarelto may need to be stopped before both of your surgeries

## 2024-04-24 NOTE — H&P
Rachelle Hunter is an 88 year old female who presents today, at Dr. Baker's and Dr. Lundberg's request, for preoperative medical evaluation.  HPI:   She was recently evaluated for microscopic hematuria and was found to have a bladder lesion on cystoscopy as well as fluid in the endometrial canal.  She is now scheduled for hysteroscopy and D&C on May 6 and cystoscopy and TURBT on May 16.  Preoperative BMP CBC PT/INR and urine culture are ordered.  She also will see Dr. Lopez of Cardiology preoperatively for Cardiology clearance.    Past medical and past surgical histories reviewed, as outlined below.  She recently had recurrent atrial fibrillation and underwent cardioversion in February followed by AV node ablation with pacemaker placement in March.  Medications reviewed, as listed.  Medication allergies reviewed-sulfa.    Recently she has been feeling well.  Diet is healthy and she walks regularly.  Weight up 10 pounds since Medicare physical with me last November.    Recent labs reviewed.  In February, CMP normal except glucose 110 BUN 11 creatinine 1.08, CBC normal, TSH slightly elevated at 7.166 with free T4 normal at 1.2.  Also in February Echo with mild LVH, EF 30-35%, mild MINOR, mild MR.    Wt Readings from Last 4 Encounters:   04/24/24 182 lb 9.6 oz (82.8 kg)   04/19/24 186 lb (84.4 kg)   04/12/24 186 lb (84.4 kg)   03/05/24 186 lb 9.6 oz (84.6 kg)     Body mass index is 25.47 kg/m².     Current Outpatient Medications   Medication Sig Dispense Refill    clobetasol 0.05 % External Ointment Apply 1 Application topically twice a week. Twice weekly for maintenance, if experiencing flare: increase usage to 2x/daily for 2 weeks followed by daily for 2 weeks, then back to maintenance 2x/week 60 g 3    midodrine 5 MG Oral Tab Take 1 tablet (5 mg total) by mouth 2 (two) times daily before meals. 180 tablet 3    estradiol (ESTRACE) 0.1 MG/GM Vaginal Cream Apply 1/2 gram vaginally 2-3 times per week. 42 g 3     latanoprost 0.005 % Ophthalmic Solution INSTILL 1 DROP IN BOTH EYES AT BEDTIME 7.5 mL 3    XARELTO 20 MG Oral Tab Take 1 tablet (20 mg total) by mouth daily with food.       Allergies   Allergen Reactions    Sulfa Antibiotics HIVES      Past Medical History:    Acute systolic CHF (congestive heart failure) ([facility])    Cardiac cath 1-18 with no obstructive CAD; Echo 1-18 with EF 35-40% ;Lexiscan GXT 6-22 with mild LVE, normal perfusion, EF 62%; Echo 2-24 with mild LVH, EF 30-35%, mild MINOR, mild MR    Aortic atherosclerosis ([facility])    CXR 12-17    BCC (basal cell carcinoma)    right tip of nose    Cholelithiasis    CT scan 4-24    Chronic kidney disease, stage 3 ([facility])    Glaucoma    2012- START Latanoprost qhs OS, based on OCT    Herpes zoster    Right leg    Kidney stones    Treated with lithotripsy    Left bundle branch block    Left leg DVT ([facility])    Partially occlusive thrombus left proximal femoral vein    LV dysfunction    Echo 1-18 with EF 35-40%; cardiac cath 1-18 with EF 40% and no obstructive CAD; Echo 7-20 with EF 40-45%, mild MR, mild TR    Mitral regurgitation    Echo 1-18 with moderate-severe MR and TR; JAI 1-18 with moderate-severe MR    Orthostatic hypotension    Osteopenia    T score -1.3 hip and -0.7 lumbar spine    Paroxysmal atrial fibrillation ([facility])    Nuclear treadmill stress test 7-15 normal with EF 57%; symptomatic recurrence s/p cardioversion 1-18 and 2-24; s/p AV node ablation 3-24 with pacemaker placement    Pulmonary hypertension ([facility])    Echo 1-18 with peak pulmonary artery pressure 44 mmHg      Past Surgical History:   Procedure Laterality Date    Appendectomy  07/2000    Ruptured appendicitis    Cardioversion  01/2018    Symptomatic atrial fibrillation    Cardioversion  02/19/2024    Cataract extraction w/  intraocular lens implant Right 04/13/2017    Dr. Rogel @ [facility]    Cataract extraction w/  intraocular lens implant Left 06/2017    Dr. Rogel     Cath pacemaker implant dual  03/04/2024    Ep  av node ablation  03/04/2024    Lithotripsy Left 06/2001    Other  07/2011    ORIF distal right radius fracture    Tubal ligation  1976      Family History   Problem Relation Age of Onset    Diabetes Father     Other (Pancreatic Cancer) Father     Other (Renal Cell Carcinoma) Brother     Crohn's Disease Brother     Glaucoma Mother     Glaucoma Paternal Aunt     Macular degeneration Paternal Aunt       Social History:  Social History     Socioeconomic History    Marital status:    Tobacco Use    Smoking status: Never    Smokeless tobacco: Never   Vaping Use    Vaping status: Never Used   Substance and Sexual Activity    Alcohol use: Not Currently     Alcohol/week: 0.8 standard drinks of alcohol     Types: 1 Glasses of wine per week    Drug use: No   Other Topics Concern    Caffeine Concern Yes     Comment: 24oz soda daily    Pt has a pacemaker No    Pt has a defibrillator No    Reaction to local anesthetic No     Social Determinants of Health     Financial Resource Strain: Low Risk  (2/21/2024)    Financial Resource Strain     Difficulty of Paying Living Expenses: Not hard at all     Med Affordability: No   Food Insecurity: No Food Insecurity (3/4/2024)    Food Insecurity     Food Insecurity: Never true   Transportation Needs: No Transportation Needs (3/4/2024)    Transportation Needs     Lack of Transportation: No   Housing Stability: Low Risk  (3/4/2024)    Housing Stability     Housing Instability: No           REVIEW OF SYSTEMS:   GENERAL: No fever  LUNGS: No cough wheezing or shortness of breath  CARDIAC: No lightheadedness palpitations or chest pain  GI: Occasional difficulty swallowing pills and sometimes food.  Occasional nausea.  No anorexia heartburn  vomiting abdominal pain diarrhea constipation or rectal bleeding  : Chronic urinary frequency without recent change.  No dysuria or hematuria  MUSCULOSKELETAL: Chronic and stable lower leg swelling bilaterally  NEURO: No headaches    EXAM:   GENERAL:  Pleasant female appearing well in no distress  /84 (BP Location: Right arm, Patient Position: Sitting, Cuff Size: large)   Pulse 91   Ht 5' 11\" (1.803 m)   Wt 182 lb 9.6 oz (82.8 kg)   BMI 25.47 kg/m² Upon recheck by me, /86 right arm supine and 138/78 right arm standing without lightheadedness  HEENT: Anicteric, conjunctiva pink, oropharynx normal  NECK: Supple without mass or thyromegaly  NODES: No peripheral adenopathy  LUNGS: Resonant to percussion and clear to auscultation without crackles or wheezes  CARDIAC: Rhythm regular S1 S2 normal without murmur, with trace-1+ pitting edema distal lower extremities bilaterally  ABDOMEN: Bowel sounds normal soft nontender without mass or hepatosplenomegaly  EXTREMITIES: Normal without cyanosis or clubbing  PULSES: 1-2+ bilateral dorsalis pedis and posterior tibial  NEURO: Reflexes 1-2+ bilaterally and symmetric     ASSESSMENT AND PLAN:   Rachelle Hunter is a 88 year old female who presents for preoperative medical evaluation.     1. Preoperative examination  Pending results of preoperative testing and Cardiology evaluation, medically stable for scheduled hysteroscopy with D&C and cystoscopy with TURBT  Xarelto will likely need to be stopped preoperatively, per surgeons and Cardiology  Return visit with me in November for Medicare physical    2. Bladder tumor  Anticipate cystoscopy with TURBT as above    3. Paroxysmal atrial fibrillation (HCC)  On Xarelto, status post recent cardioversion, AV node ablation and pacemaker placement  Upcoming visit with Cardiology    4. Chronic systolic heart failure (HCC)  Compensated without symptoms or signs of CHF    5. Stage 3 chronic kidney disease, unspecified whether stage 3a or 3b CKD (HCC)  Mild and stable      Stoney Darnell MD  4/24/2024  8:51 AM

## 2024-04-27 ENCOUNTER — LAB ENCOUNTER (OUTPATIENT)
Dept: LAB | Facility: HOSPITAL | Age: 89
End: 2024-04-27
Attending: UROLOGY
Payer: MEDICARE

## 2024-04-27 DIAGNOSIS — R31.9 HEMATURIA, UNSPECIFIED TYPE: ICD-10-CM

## 2024-04-27 DIAGNOSIS — N20.0 NEPHROLITHIASIS: ICD-10-CM

## 2024-04-27 LAB
ANION GAP SERPL CALC-SCNC: 6 MMOL/L (ref 0–18)
BUN BLD-MCNC: 14 MG/DL (ref 9–23)
BUN/CREAT SERPL: 14.1 (ref 10–20)
CALCIUM BLD-MCNC: 9.5 MG/DL (ref 8.7–10.4)
CHLORIDE SERPL-SCNC: 107 MMOL/L (ref 98–112)
CO2 SERPL-SCNC: 28 MMOL/L (ref 21–32)
CREAT BLD-MCNC: 0.99 MG/DL
DEPRECATED RDW RBC AUTO: 46.5 FL (ref 35.1–46.3)
EGFRCR SERPLBLD CKD-EPI 2021: 56 ML/MIN/1.73M2 (ref 60–?)
ERYTHROCYTE [DISTWIDTH] IN BLOOD BY AUTOMATED COUNT: 13.5 % (ref 11–15)
FASTING STATUS PATIENT QL REPORTED: YES
GLUCOSE BLD-MCNC: 101 MG/DL (ref 70–99)
HCT VFR BLD AUTO: 43.7 %
HGB BLD-MCNC: 14.1 G/DL
INR BLD: 1.15 (ref 0.8–1.2)
MCH RBC QN AUTO: 29.9 PG (ref 26–34)
MCHC RBC AUTO-ENTMCNC: 32.3 G/DL (ref 31–37)
MCV RBC AUTO: 92.8 FL
OSMOLALITY SERPL CALC.SUM OF ELEC: 293 MOSM/KG (ref 275–295)
PLATELET # BLD AUTO: 204 10(3)UL (ref 150–450)
POTASSIUM SERPL-SCNC: 3.9 MMOL/L (ref 3.5–5.1)
PROTHROMBIN TIME: 15.4 SECONDS (ref 11.6–14.8)
RBC # BLD AUTO: 4.71 X10(6)UL
SODIUM SERPL-SCNC: 141 MMOL/L (ref 136–145)
WBC # BLD AUTO: 5.5 X10(3) UL (ref 4–11)

## 2024-04-27 PROCEDURE — 87086 URINE CULTURE/COLONY COUNT: CPT

## 2024-04-27 PROCEDURE — 80048 BASIC METABOLIC PNL TOTAL CA: CPT

## 2024-04-27 PROCEDURE — 85610 PROTHROMBIN TIME: CPT

## 2024-04-27 PROCEDURE — 85027 COMPLETE CBC AUTOMATED: CPT

## 2024-04-27 PROCEDURE — 36415 COLL VENOUS BLD VENIPUNCTURE: CPT

## 2024-05-02 ENCOUNTER — TELEPHONE (OUTPATIENT)
Dept: INTERNAL MEDICINE CLINIC | Facility: CLINIC | Age: 89
End: 2024-05-02

## 2024-05-02 RX ORDER — SODIUM CHLORIDE, SODIUM LACTATE, POTASSIUM CHLORIDE, CALCIUM CHLORIDE 600; 310; 30; 20 MG/100ML; MG/100ML; MG/100ML; MG/100ML
INJECTION, SOLUTION INTRAVENOUS CONTINUOUS
Status: DISCONTINUED | OUTPATIENT
Start: 2024-05-02 | End: 2024-05-02

## 2024-05-02 NOTE — TELEPHONE ENCOUNTER
Celina from Dr. Baker's office would like medical clearance, office notes, labs, EKG. Please fax to [phone]. Patient is having surgery on 5/6/24. Please advise

## 2024-05-02 NOTE — TELEPHONE ENCOUNTER
Medically stable for surgery assuming clearance from Cardiology.  Okay to send copies of my note from April 24 along with preoperative labs.

## 2024-05-03 ENCOUNTER — TELEPHONE (OUTPATIENT)
Dept: SURGERY | Facility: CLINIC | Age: 89
End: 2024-05-03

## 2024-05-03 NOTE — TELEPHONE ENCOUNTER
Stoney Darnell MD Jirschele, Kelly, DO; Lina Lundberg MD  Ms. Rachelle Hunter was seen by me for preoperative medical evaluation and is medically stable for her scheduled surgeries, pending clearance from Dr. Lopez of Cardiology

## 2024-05-03 NOTE — DISCHARGE INSTRUCTIONS
[facility]     Post-Operative Guidelines      May resume home meds   May use Ibuprofen or acetaminophen as needed for pain       AVOID CONSTIPATION - Take Miralax: one capful in water or juice each morning.  You can also take each evening if needed. Use milk of magnesia daily as needed to avoid straining with BMs  It is okay to walk up and down stairs and to walk outside, but be careful not to become fatigued.  Showers and tub baths are okay  You may ride in a car immediately.      Please call us for any of the following:  Temperature above 100.5 for 4 hours or above 101.0 at any time  Chest pain or trouble breathing  Vaginal bleeding heavier than a period  Redness, tenderness or swelling of your legs  Pain or burning when you urinate  Redness, pain or foul discharge from incision    Office telephone, [phone], after hours [phone]     HOME INSTRUCTIONS  [facility] INSTRUCTIONS: POST-OP ANESTHESIA  The medication that you received for sedation or general anesthesia can last up to 24 hours. Your judgment and reflexes may be altered, even if you feel like your normal self.      We Recommend:   Do not drive any motor vehicle or bicycle   Avoid mowing the lawn, playing sports, or working with power tools/applicances (power saws, electric knives or mixers)   That you have someone stay with you on your first night home   Do not drink alcohol or take sleeping pills or tranquilizers   Do not sign legal documents within 24 hours of your procedure   If you had a nerve block for your surgery, take extra care not to put any pressure on your arm or hand for 24 hours    It is normal:  For you to have a sore throat if you had a breathing tube during surgery (while you were asleep!). The sore throat should get better within 48 hours. You can gargle with warm salt water (1/2 tsp in 4 oz warm water) or use a throat lozenge for comfort  To feel muscle aches or  soreness especially in the abdomen, chest or neck. The achy feeling should go away in the next 24 hours  To feel weak, sleepy or \"wiped out\". Your should start feeling better in the next 24 hours.   To experience mild discomforts such as sore lip or tongue, headache, cramps, gas pains or a bloated feeling in your abdomen.   To experience mild back pain or soreness for a day or two if you had spinal or epidural anesthesia.   If you had laparoscopic surgery, to feel shoulder pain or discomfort on the day of surgery.   For some patients to have nausea after surgery/anesthesia    If you feel nausea or experience vomiting:   Try to move around less.   Eat less than usual or drink only liquids until the next morning   Nausea should resolve in about 24 hours    If you have a problem when you are at home:    Call your surgeons office   Discharge Instructions: After Your Surgery  You’ve just had surgery. During surgery, you were given medicine called anesthesia to keep you relaxed and free of pain. After surgery, you may have some pain or nausea. This is common. Here are some tips for feeling better and getting well after surgery.   Going home  Your healthcare provider will show you how to take care of yourself when you go home. They'll also answer your questions. Have an adult family member or friend drive you home. For the first 24 hours after your surgery:   Don't drive or use heavy equipment.  Don't make important decisions or sign legal papers.  Take medicines as directed.  Don't drink alcohol.  Have someone stay with you, if needed. They can watch for problems and help keep you safe.  Be sure to go to all follow-up visits with your healthcare provider. And rest after your surgery for as long as your provider tells you to.   Coping with pain  If you have pain after surgery, pain medicine will help you feel better. Take it as directed, before pain becomes severe. Also, ask your healthcare provider or pharmacist about  other ways to control pain. This might be with heat, ice, or relaxation. And follow any other instructions your surgeon or nurse gives you.      Stay on schedule with your medicine.     Tips for taking pain medicine  To get the best relief possible, remember these points:   Pain medicines can upset your stomach. Taking them with a little food may help.  Most pain relievers taken by mouth need at least 20 to 30 minutes to start to work.  Don't wait till your pain becomes severe before you take your medicine. Try to time your medicine so that you can take it before starting an activity. This might be before you get dressed, go for a walk, or sit down for dinner.  Constipation is a common side effect of some pain medicines. Call your healthcare provider before taking any medicines such as laxatives or stool softeners to help ease constipation. Also ask if you should skip any foods. Drinking lots of fluids and eating foods such as fruits and vegetables that are high in fiber can also help. Remember, don't take laxatives unless your surgeon has prescribed them.  Drinking alcohol and taking pain medicine can cause dizziness and slow your breathing. It can even be deadly. Don't drink alcohol while taking pain medicine.  Pain medicine can make you react more slowly to things. Don't drive or run machinery while taking pain medicine.  Your healthcare provider may tell you to take acetaminophen to help ease your pain. Ask them how much you're supposed to take each day. Acetaminophen or other pain relievers may interact with your prescription medicines or other over-the-counter (OTC) medicines. Some prescription medicines have acetaminophen and other ingredients in them. Using both prescription and OTC acetaminophen for pain can cause you to accidentally overdose. Read the labels on your OTC medicines with care. This will help you to clearly know the list of ingredients, how much to take, and any warnings. It may also help you  not take too much acetaminophen. If you have questions or don't understand the information, ask your pharmacist or healthcare provider to explain it to you before you take the OTC medicine.   Managing nausea  Some people have an upset stomach (nausea) after surgery. This is often because of anesthesia, pain, or pain medicine, less movement of food in the stomach, or the stress of surgery. These tips will help you handle nausea and eat healthy foods as you get better. If you were on a special food plan before surgery, ask your healthcare provider if you should follow it while you get better. Check with your provider on how your eating should progress. It may depend on the surgery you had. These general tips may help:   Don't push yourself to eat. Your body will tell you when to eat and how much.  Start off with clear liquids and soup. They're easier to digest.  Next try semi-solid foods as you feel ready. These include mashed potatoes, applesauce, and gelatin.  Slowly move to solid foods. Don’t eat fatty, rich, or spicy foods at first.  Don't force yourself to have 3 large meals a day. Instead eat smaller amounts more often.  Take pain medicines with a small amount of solid food, such as crackers or toast. This helps prevent nausea.  When to call your healthcare provider  Call your healthcare provider right away if you have any of these:   You still have too much pain, or the pain gets worse, after taking the medicine. The medicine may not be strong enough. Or there may be a complication from the surgery.  You feel too sleepy, dizzy, or groggy. The medicine may be too strong.  Side effects such as nausea or vomiting. Your healthcare provider may advise taking other medicines to .  Skin changes such as rash, itching, or hives. This may mean you have an allergic reaction. Your provider may advise taking other medicines.  The incision looks different (for instance, part of it opens up).  Bleeding or fluid leaking from  the incision site, and weren't told to expect that.  Fever of 100.4°F (38°C) or higher, or as directed by your provider.  Call 911  Call 911 right away if you have:   Trouble breathing  Facial swelling    If you have obstructive sleep apnea   You were given anesthesia medicine during surgery to keep you comfortable and free of pain. After surgery, you may have more apnea spells because of this medicine and other medicines you were given. The spells may last longer than normal.    At home:  Keep using the continuous positive airway pressure (CPAP) device when you sleep. Unless your healthcare provider tells you not to, use it when you sleep, day or night. CPAP is a common device used to treat obstructive sleep apnea.  Talk with your provider before taking any pain medicine, muscle relaxants, or sedatives. Your provider will tell you about the possible dangers of taking these medicines.  Contact your provider if your sleeping changes a lot even when taking medicines as directed.  [employer] last reviewed this educational content on 10/1/2021  © 6236-3994 [employer]. All rights reserved. This information is not intended as a substitute for professional medical care. Always follow your healthcare professional's instructions.

## 2024-05-06 ENCOUNTER — HOSPITAL ENCOUNTER (OUTPATIENT)
Facility: HOSPITAL | Age: 89
Setting detail: HOSPITAL OUTPATIENT SURGERY
Discharge: HOME | End: 2024-05-06
Attending: OBSTETRICS & GYNECOLOGY | Admitting: OBSTETRICS & GYNECOLOGY
Payer: MEDICARE

## 2024-05-06 ENCOUNTER — TELEPHONE (OUTPATIENT)
Dept: SURGERY | Facility: CLINIC | Age: 89
End: 2024-05-06

## 2024-05-06 ENCOUNTER — ANESTHESIA (OUTPATIENT)
Dept: SURGERY | Facility: HOSPITAL | Age: 89
End: 2024-05-06
Payer: MEDICARE

## 2024-05-06 ENCOUNTER — ANESTHESIA EVENT (OUTPATIENT)
Dept: SURGERY | Facility: HOSPITAL | Age: 89
End: 2024-05-06
Payer: MEDICARE

## 2024-05-06 VITALS
TEMPERATURE: 98 F | SYSTOLIC BLOOD PRESSURE: 136 MMHG | HEIGHT: 71 IN | BODY MASS INDEX: 25.2 KG/M2 | HEART RATE: 80 BPM | DIASTOLIC BLOOD PRESSURE: 81 MMHG | WEIGHT: 180 LBS | OXYGEN SATURATION: 96 % | RESPIRATION RATE: 14 BRPM

## 2024-05-06 PROCEDURE — 88305 TISSUE EXAM BY PATHOLOGIST: CPT | Performed by: OBSTETRICS & GYNECOLOGY

## 2024-05-06 PROCEDURE — 0UB98ZZ EXCISION OF UTERUS, VIA NATURAL OR ARTIFICIAL OPENING ENDOSCOPIC: ICD-10-PCS | Performed by: OBSTETRICS & GYNECOLOGY

## 2024-05-06 RX ORDER — ONDANSETRON 2 MG/ML
INJECTION INTRAMUSCULAR; INTRAVENOUS AS NEEDED
Status: DISCONTINUED | OUTPATIENT
Start: 2024-05-06 | End: 2024-05-06 | Stop reason: SURG

## 2024-05-06 RX ORDER — NALOXONE HYDROCHLORIDE 0.4 MG/ML
80 INJECTION, SOLUTION INTRAMUSCULAR; INTRAVENOUS; SUBCUTANEOUS AS NEEDED
Status: DISCONTINUED | OUTPATIENT
Start: 2024-05-06 | End: 2024-05-06

## 2024-05-06 RX ORDER — MORPHINE SULFATE 4 MG/ML
4 INJECTION, SOLUTION INTRAMUSCULAR; INTRAVENOUS EVERY 10 MIN PRN
Status: DISCONTINUED | OUTPATIENT
Start: 2024-05-06 | End: 2024-05-06

## 2024-05-06 RX ORDER — SODIUM CHLORIDE 9 MG/ML
INJECTION, SOLUTION INTRAVENOUS CONTINUOUS
Status: DISCONTINUED | OUTPATIENT
Start: 2024-05-06 | End: 2024-05-06

## 2024-05-06 RX ORDER — HYDROMORPHONE HYDROCHLORIDE 1 MG/ML
0.2 INJECTION, SOLUTION INTRAMUSCULAR; INTRAVENOUS; SUBCUTANEOUS EVERY 5 MIN PRN
Status: DISCONTINUED | OUTPATIENT
Start: 2024-05-06 | End: 2024-05-06

## 2024-05-06 RX ORDER — MORPHINE SULFATE 4 MG/ML
2 INJECTION, SOLUTION INTRAMUSCULAR; INTRAVENOUS EVERY 10 MIN PRN
Status: DISCONTINUED | OUTPATIENT
Start: 2024-05-06 | End: 2024-05-06

## 2024-05-06 RX ORDER — LIDOCAINE HYDROCHLORIDE 10 MG/ML
INJECTION, SOLUTION EPIDURAL; INFILTRATION; INTRACAUDAL; PERINEURAL AS NEEDED
Status: DISCONTINUED | OUTPATIENT
Start: 2024-05-06 | End: 2024-05-06 | Stop reason: SURG

## 2024-05-06 RX ORDER — ACETAMINOPHEN 500 MG
1000 TABLET ORAL ONCE
Status: COMPLETED | OUTPATIENT
Start: 2024-05-06 | End: 2024-05-06

## 2024-05-06 RX ORDER — SODIUM CHLORIDE, SODIUM LACTATE, POTASSIUM CHLORIDE, CALCIUM CHLORIDE 600; 310; 30; 20 MG/100ML; MG/100ML; MG/100ML; MG/100ML
INJECTION, SOLUTION INTRAVENOUS CONTINUOUS PRN
Status: DISCONTINUED | OUTPATIENT
Start: 2024-05-06 | End: 2024-05-06 | Stop reason: SURG

## 2024-05-06 RX ORDER — HYDROMORPHONE HYDROCHLORIDE 1 MG/ML
0.4 INJECTION, SOLUTION INTRAMUSCULAR; INTRAVENOUS; SUBCUTANEOUS EVERY 5 MIN PRN
Status: DISCONTINUED | OUTPATIENT
Start: 2024-05-06 | End: 2024-05-06

## 2024-05-06 RX ORDER — DEXAMETHASONE SODIUM PHOSPHATE 4 MG/ML
VIAL (ML) INJECTION AS NEEDED
Status: DISCONTINUED | OUTPATIENT
Start: 2024-05-06 | End: 2024-05-06 | Stop reason: SURG

## 2024-05-06 RX ORDER — SODIUM CHLORIDE, SODIUM LACTATE, POTASSIUM CHLORIDE, CALCIUM CHLORIDE 600; 310; 30; 20 MG/100ML; MG/100ML; MG/100ML; MG/100ML
INJECTION, SOLUTION INTRAVENOUS CONTINUOUS
Status: DISCONTINUED | OUTPATIENT
Start: 2024-05-06 | End: 2024-05-06

## 2024-05-06 RX ORDER — MORPHINE SULFATE 10 MG/ML
6 INJECTION, SOLUTION INTRAMUSCULAR; INTRAVENOUS EVERY 10 MIN PRN
Status: DISCONTINUED | OUTPATIENT
Start: 2024-05-06 | End: 2024-05-06

## 2024-05-06 RX ORDER — HYDROMORPHONE HYDROCHLORIDE 1 MG/ML
0.6 INJECTION, SOLUTION INTRAMUSCULAR; INTRAVENOUS; SUBCUTANEOUS EVERY 5 MIN PRN
Status: DISCONTINUED | OUTPATIENT
Start: 2024-05-06 | End: 2024-05-06

## 2024-05-06 RX ADMIN — DEXAMETHASONE SODIUM PHOSPHATE 4 MG: 4 MG/ML VIAL (ML) INJECTION at 11:34:00

## 2024-05-06 RX ADMIN — LIDOCAINE HYDROCHLORIDE 50 MG: 10 INJECTION, SOLUTION EPIDURAL; INFILTRATION; INTRACAUDAL; PERINEURAL at 11:34:00

## 2024-05-06 RX ADMIN — ONDANSETRON 4 MG: 2 INJECTION INTRAMUSCULAR; INTRAVENOUS at 11:34:00

## 2024-05-06 RX ADMIN — SODIUM CHLORIDE, SODIUM LACTATE, POTASSIUM CHLORIDE, CALCIUM CHLORIDE: 600; 310; 30; 20 INJECTION, SOLUTION INTRAVENOUS at 11:32:00

## 2024-05-06 RX ADMIN — SODIUM CHLORIDE, SODIUM LACTATE, POTASSIUM CHLORIDE, CALCIUM CHLORIDE: 600; 310; 30; 20 INJECTION, SOLUTION INTRAVENOUS at 11:57:00

## 2024-05-06 NOTE — ANESTHESIA PROCEDURE NOTES
Airway  Date/Time: 5/6/2024 11:44 AM  Urgency: Elective      General Information and Staff    Patient location during procedure: OR  Anesthesiologist: Zion Marrero MD  Performed: anesthesiologist   Performed by: Zion Marrero MD  Authorized by: Zion Marrero MD      Indications and Patient Condition  Indications for airway management: anesthesia  Sedation level: deep  Preoxygenated: yes  Patient position: sniffing  Mask difficulty assessment: 1 - vent by mask    Final Airway Details  Final airway type: supraglottic airway      Successful airway: classic  Size 4       Number of attempts at approach: 1

## 2024-05-06 NOTE — OPERATIVE REPORT
Pre Op: thickened endometrium  Post op: same + endometrial polyp     Procedure:operative hysteroscopy with dilation, curettage, and TruClear polpyectomy     Surgeon: ROBYN Baker DO    Assist: Sylvester, given the nature and complexity of the case it was necessary to have a skilled assistant for position and retraction     No complications     Findings: small polyp noted in endometrium, photos taken. Hemostasis upon completion.     Specimen: endometrial polyp, endometrial curettings     EBL: 5cc  UOP: 100cc     Fluid deficit 80cc    Gen anesthesia, LMA    PROCEDURE:  The patient was taken to the operating room, with IV running after informed consent was obtained.    She was placed in the supine position and induced under anesthesia.    The patient was then placed in the dorsal lithotomy position and prepped and draped in the usual sterile fashion.        Bladder emptied    Cervix was noted inside the introitus, tenaculum was placed on anterior lip and cervix dilated to 6. Hysteroscope was introduced and polyp visualized Bilateral fallopian tube ostia visualized. TruClear soft tissue device used to clear endometrium.  Hysteroscope removed.  Several passes made with curet, smooth. Scant endometrial specimen removed with curettings. Curetting performed to gritty texture circumferentially.     80cc total fluid deficit  Tenaculum removed from cervix. Cervix & uterus reduced into vagina. Hemostasis verified.     Specimen sent to pathology.     The patient was then removed from the dorsal lithotomy position, awakened, and transferred from the operating room to the same day surgery area in stable condition, having tolerated the procedure well.    Sponge, lap, & needle counts were correct.

## 2024-05-06 NOTE — ANESTHESIA POSTPROCEDURE EVALUATION
Patient: Rachelle Hunter    Procedure Summary       Date: 05/06/24 Room / Location: [facility]  / [facility]    Anesthesia Start: 1132 Anesthesia Stop: 1216    Procedure: Hysteroscopy with dilation and curettage; Truclear Polypectomy (Vagina ) Diagnosis: (Thickened endometrium; endometrial polyp)    Surgeons: Marce Baker DO Anesthesiologist: Miley Luna MD    Anesthesia Type: general ASA Status: 3            Anesthesia Type: general    Vitals Value Taken Time   /82 05/06/24 1208   Temp 97.4 °F (36.3 °C) 05/06/24 1208   Pulse 80 05/06/24 1215   Resp 19 05/06/24 1215   SpO2 95 % 05/06/24 1215   Vitals shown include unfiled device data.    [facility] Post Evaluation:   Patient Evaluated in PACU  Patient Participation: complete - patient participated  Level of Consciousness: awake  Pain Management: adequate  Airway Patency:patent  Dental exam unchanged from preop  Yes    Cardiovascular Status: acceptable  Respiratory Status: acceptable  Postoperative Hydration acceptable      MILEY LUNA MD  5/6/2024 12:16 PM

## 2024-05-06 NOTE — TELEPHONE ENCOUNTER
Received patient' clearance, will place on ' desk for, fax to pre-admit, also place in scan bin for review.

## 2024-05-06 NOTE — H&P
87 y/o female with thickened endometrium and PMB for surgical mgmt    Pre operative clearance with Dr Darnell, pt seen & examined without changes.  Dr Lopez cardiology clearance in chart    Thorough discussion of surgical risks, benefits, and alternatives including, but not limited to bleeding/clots, infection, injury to nearby organs (urethra, bladder, ureters, bowel, blood vessels), dyspareunia, de jordyn UI, worsening UI, recurrence, voiding dysfunction, and pain.    Discussed pain mgmt and potential need for narcotics. Discussed addiction potential with narcotics. IL  reviewed.    Plan to proceed with [facility] with D&C, poss polypectomy as consented  All questions answered.   Marce Baker  [phone]

## 2024-05-06 NOTE — ANESTHESIA PREPROCEDURE EVALUATION
Anesthesia PreOp Note    HPI:     Rachelle Hunter is a 88 year old female who presents for preoperative consultation requested by: Marce Baker DO    Date of Surgery: 5/6/2024    Procedure(s):  Hysteroscopy with dilation and curettage  Indication: Thickened endometrium    Relevant Problems   No relevant active problems       NPO:  Last Liquid Consumption Date: 05/05/24  Last Liquid Consumption Time: 2300  Last Solid Consumption Date: 05/05/24  Last Solid Consumption Time: 2130  Last Liquid Consumption Date: 05/05/24          History Review:  Patient Active Problem List    Diagnosis Date Noted    Cholelithiasis 04/09/2024    Atrial fibrillation with normal ventricular rate (HCC) 02/19/2024    Atrial fibrillation ([facility]) 02/19/2024    Orthostatic hypotension 11/14/2023    Epidermal cyst 11/09/2021    Left leg DVT ([facility]) 10/2021    Neoplasm of uncertain behavior of skin 04/26/2021    Sun-damaged skin 04/26/2021    Chronic kidney disease, stage 3 ([facility])     After-cataract obscuring vision, bilateral 08/20/2019    Left bundle branch block     History of cardioversion 01/23/2018    Chronic systolic heart failure ([facility]) 01/11/2018    Aortic atherosclerosis ([facility])     LV dysfunction     Mitral regurgitation     Pulmonary hypertension ([facility])     Atrial fibrillation with rapid ventricular response ([facility]) 12/31/2017    Other chest pain 12/31/2017    Pseudophakia of both eyes 12/14/2017    Actinic keratosis 10/20/2016    Vitreous floaters of both eyes 07/21/2015    Primary open angle glaucoma of both eyes, moderate stage 12/02/2014    Paroxysmal atrial fibrillation ([facility]) 08/16/2014    Osteopenia 08/16/2014    Kidney stones 08/16/2014       Past Medical History:    Acute systolic CHF (congestive heart failure) ([facility])    Cardiac cath 1-18 with no obstructive CAD; Echo 1-18 with EF 35-40% ;Lexiscan GXT 6-22 with mild LVE, normal perfusion, EF 62%; Echo 2-24 with mild LVH, EF 30-35%, mild MINOR, mild MR    Aortic atherosclerosis ([facility])     CXR 12-17    BCC (basal cell carcinoma)    right tip of nose    Cataract    Cholelithiasis    CT scan 4-24    Chronic kidney disease, stage 3 (HCC)    Glaucoma    2012- START Latanoprost qhs OS, based on OCT    Herpes zoster    Right leg    Kidney stones    Treated with lithotripsy    Left bundle branch block    Left leg DVT (HCC)    Partially occlusive thrombus left proximal femoral vein    LV dysfunction    Echo 1-18 with EF 35-40%; cardiac cath 1-18 with EF 40% and no obstructive CAD; Echo 7-20 with EF 40-45%, mild MR, mild TR    Mitral regurgitation    Echo 1-18 with moderate-severe MR and TR; JAI 1-18 with moderate-severe MR    Orthostatic hypotension    Osteopenia    T score -1.3 hip and -0.7 lumbar spine    Paroxysmal atrial fibrillation (HCC)    Nuclear treadmill stress test 7-15 normal with EF 57%; symptomatic recurrence s/p cardioversion 1-18 and 2-24; s/p AV node ablation 3-24 with pacemaker placement    PONV (postoperative nausea and vomiting)    Pulmonary hypertension (HCC)    Echo 1-18 with peak pulmonary artery pressure 44 mmHg       Past Surgical History:   Procedure Laterality Date    Appendectomy  07/2000    Ruptured appendicitis    Cardiac pacemaker placement      Cardioversion  01/2018    Symptomatic atrial fibrillation    Cardioversion  02/19/2024    Cataract extraction w/  intraocular lens implant Right 04/13/2017    Dr. Rogel @ [facility]    Cataract extraction w/  intraocular lens implant Left 06/2017    Dr. Rogel     Cath pacemaker implant dual  03/04/2024    Ep av node ablation  03/04/2024    Lithotripsy Left 06/2001    Other  07/2011    ORIF distal right radius fracture    Tubal ligation  1976       Facility-Administered Medications Prior to Admission   Medication Dose Route Frequency Provider Last Rate Last Admin    lidocaine (Urojet) 2 % urethral jelly 10 mL  10 mL Intravesical Once Marce Baker,          Medications Prior to Admission   Medication Sig Dispense Refill Last Dose     clobetasol 0.05 % External Ointment Apply 1 Application topically twice a week. Twice weekly for maintenance, if experiencing flare: increase usage to 2x/daily for 2 weeks followed by daily for 2 weeks, then back to maintenance 2x/week 60 g 3 5/3/2024    midodrine 5 MG Oral Tab Take 1 tablet (5 mg total) by mouth 2 (two) times daily before meals. 180 tablet 3 5/6/2024 at 800    estradiol (ESTRACE) 0.1 MG/GM Vaginal Cream Apply 1/2 gram vaginally 2-3 times per week. 42 g 3 Past Week    latanoprost 0.005 % Ophthalmic Solution INSTILL 1 DROP IN BOTH EYES AT BEDTIME 7.5 mL 3 5/6/2024 at 800    XARELTO 20 MG Oral Tab Take 1 tablet (20 mg total) by mouth daily with food.   5/3/2024     Current Facility-Administered Medications Ordered in Epic   Medication Dose Route Frequency Provider Last Rate Last Admin    sodium chloride 0.9% infusion   Intravenous Continuous Marce Baker,  20 mL/hr at 05/06/24 1030 New Bag at 05/06/24 1030     No current [facility]-ordered outpatient medications on file.       Allergies   Allergen Reactions    Sulfa Antibiotics HIVES       Family History   Problem Relation Age of Onset    Diabetes Father     Other (Pancreatic Cancer) Father     Other (Renal Cell Carcinoma) Brother     Crohn's Disease Brother     Glaucoma Mother     Glaucoma Paternal Aunt     Macular degeneration Paternal Aunt      Social History     Socioeconomic History    Marital status:    Tobacco Use    Smoking status: Never    Smokeless tobacco: Never   Vaping Use    Vaping status: Never Used   Substance and Sexual Activity    Alcohol use: Not Currently     Alcohol/week: 0.8 standard drinks of alcohol     Types: 1 Glasses of wine per week    Drug use: No   Other Topics Concern    Caffeine Concern Yes     Comment: 24oz soda daily    Pt has a pacemaker No    Pt has a defibrillator No    Reaction to local anesthetic No       Available pre-op labs reviewed.  Lab Results   Component Value Date    WBC 5.5 04/27/2024    RBC 4.71  04/27/2024    HGB 14.1 04/27/2024    HCT 43.7 04/27/2024    MCV 92.8 04/27/2024    MCH 29.9 04/27/2024    MCHC 32.3 04/27/2024    RDW 13.5 04/27/2024    .0 04/27/2024     Lab Results   Component Value Date     04/27/2024    K 3.9 04/27/2024     04/27/2024    CO2 28.0 04/27/2024    BUN 14 04/27/2024    CREATSERUM 0.99 04/27/2024     (H) 04/27/2024    CA 9.5 04/27/2024     Lab Results   Component Value Date    INR 1.15 04/27/2024       Vital Signs:  Body mass index is 25.1 kg/m².   height is 1.803 m (5' 11\") and weight is 81.6 kg (180 lb). Her oral temperature is 97.5 °F (36.4 °C). Her blood pressure is 167/88 (abnormal) and her pulse is 80. Her respiration is 16 and oxygen saturation is 95%.   Vitals:    05/02/24 0945 05/06/24 1007   BP:  (!) 167/88   Pulse:  80   Resp:  16   Temp:  97.5 °F (36.4 °C)   TempSrc:  Oral   SpO2:  95%   Weight: 82.6 kg (182 lb) 81.6 kg (180 lb)   Height: 1.803 m (5' 11\")         Anesthesia Evaluation     Patient summary reviewed and Nursing notes reviewed    No history of anesthetic complications   Airway   Mallampati: II  Neck ROM: full  Dental      Pulmonary - negative ROS and normal exam   Cardiovascular - negative ROS and normal exam    Neuro/Psych - negative ROS     GI/Hepatic/Renal - negative ROS     Endo/Other - negative ROS   Abdominal  - normal exam                 Anesthesia Plan:   ASA:  3  Plan:   General  Airway:  LMA  Post-op Pain Management: IV analgesics  Plan Comments: Clearance from dr ceballos and dr jasmine pt with pacemaker will proceed  Informed Consent Plan and Risks Discussed With:  Patient      I have informed Rachelle DE LA TORRE Young and/or legal guardian or family member of the nature of the anesthetic plan, benefits, risks including possible dental damage if relevant, major complications, and any alternative forms of anesthetic management.   All of the patient's questions were answered to the best of my ability. The patient desires the anesthetic  management as planned.  MILEY LUNA MD  5/6/2024 11:11 AM  Present on Admission:  **None**

## 2024-05-07 ENCOUNTER — TELEPHONE (OUTPATIENT)
Dept: UROLOGY | Facility: HOSPITAL | Age: 89
End: 2024-05-07

## 2024-05-07 NOTE — TELEPHONE ENCOUNTER
Outgoing call to patient to inform her that Dr Baker reviewed the results from the pathology tissue and it was benign (normal).  Pt verbalized understanding. Encouraged patient to branden with any questions or concerns.

## 2024-05-07 NOTE — TELEPHONE ENCOUNTER
TC to pt following surgery  Doing well, no complaints  Pain controlled with PO meds (tylenol)  Reviewed bowel mgmt and activity restrictions  Tolerates ambulation  No heavy vaginal bleeding, some spotting  No CP or SOB  All questions answered  Encouraged her to call with questions or concerns  Dr. Baker will call with path results once back  Follow up as scheduled  She understands and agrees to plan    Jacy Gonzales PA-C

## 2024-05-15 RX ORDER — CEFAZOLIN SODIUM/WATER 2 G/20 ML
2 SYRINGE (ML) INTRAVENOUS ONCE
Status: DISCONTINUED | OUTPATIENT
Start: 2024-05-15 | End: 2024-05-15

## 2024-05-15 RX ORDER — CEFAZOLIN SODIUM/WATER 2 G/20 ML
2 SYRINGE (ML) INTRAVENOUS ONCE
Status: COMPLETED | OUTPATIENT
Start: 2024-05-16 | End: 2024-05-16

## 2024-05-15 NOTE — DISCHARGE INSTRUCTIONS
HOME INSTRUCTIONS    You will have a Yates catheter for 10 days.  Someone will contact you from our department to let you know when to come to our clinic to have his catheter removed.  The urine in the tubing is to be clear, yellow, pink or red. If it is very red or thick like tomato soup, go to the ER. Please stay as hydrated as possible to keep the urine in the tubing is light as possible.    It is imperative that you avoid heavy lifting over 10 pounds (a gallon of milk).It is also imperative that you avoid constipation, coughing or any pressure on the pelvis that could lead to further bleeding.  Please avoid physical therapy for the next 4 weeks minimum.    I have prescribed 3 days of an antibiotic that I require that you take.  I additionally have prescribed you for stool softeners.to help with constipation. If you have diarrhea while on these medications, you can stop taking the stool softeners. Oxybutynin has been written for bladder spasms. It can cause dry eyes, dry mouth, constipation and with long term use can cause memory issues. Only take if needed.    If you have any fevers, chills, the catheter stops draining, you are worried about the color of the urine (it is very dark and you cannot see through it in the tubing) please go to the emergency room.    Call T: [phone] if you have any questions or concerns        [facility] HOME CARE INSTRUCTIONS: POST-OP ANESTHESIA  The medication that you received for sedation or general anesthesia can last up to 24 hours. Your judgment and reflexes may be altered, even if you feel like your normal self.      We Recommend:   Do not drive any motor vehicle or bicycle   Avoid mowing the lawn, playing sports, or working with power tools/applicances (power saws, electric knives or mixers)   That you have someone stay with you on your first night home   Do not drink alcohol or take sleeping pills or tranquilizers   Do not sign legal documents within 24 hours of your  procedure   If you had a nerve block for your surgery, take extra care not to put any pressure on your arm or hand for 24 hours    It is normal:  For you to have a sore throat if you had a breathing tube during surgery (while you were asleep!). The sore throat should get better within 48 hours. You can gargle with warm salt water (1/2 tsp in 4 oz warm water) or use a throat lozenge for comfort  To feel muscle aches or soreness especially in the abdomen, chest or neck. The achy feeling should go away in the next 24 hours  To feel weak, sleepy or \"wiped out\". Your should start feeling better in the next 24 hours.   To experience mild discomforts such as sore lip or tongue, headache, cramps, gas pains or a bloated feeling in your abdomen.   To experience mild back pain or soreness for a day or two if you had spinal or epidural anesthesia.   If you had laparoscopic surgery, to feel shoulder pain or discomfort on the day of surgery.   For some patients to have nausea after surgery/anesthesia    If you feel nausea or experience vomiting:   Try to move around less.   Eat less than usual or drink only liquids until the next morning   Nausea should resolve in about 24 hours    If you have a problem when you are at home:    Call your surgeons office   Discharge Instructions: After Your Surgery  You’ve just had surgery. During surgery, you were given medicine called anesthesia to keep you relaxed and free of pain. After surgery, you may have some pain or nausea. This is common. Here are some tips for feeling better and getting well after surgery.   Going home  Your healthcare provider will show you how to take care of yourself when you go home. They'll also answer your questions. Have an adult family member or friend drive you home. For the first 24 hours after your surgery:   Don't drive or use heavy equipment.  Don't make important decisions or sign legal papers.  Take medicines as directed.  Don't drink alcohol.  Have  someone stay with you, if needed. They can watch for problems and help keep you safe.  Be sure to go to all follow-up visits with your healthcare provider. And rest after your surgery for as long as your provider tells you to.   Coping with pain  If you have pain after surgery, pain medicine will help you feel better. Take it as directed, before pain becomes severe. Also, ask your healthcare provider or pharmacist about other ways to control pain. This might be with heat, ice, or relaxation. And follow any other instructions your surgeon or nurse gives you.      Stay on schedule with your medicine.     Tips for taking pain medicine  To get the best relief possible, remember these points:   Pain medicines can upset your stomach. Taking them with a little food may help.  Most pain relievers taken by mouth need at least 20 to 30 minutes to start to work.  Don't wait till your pain becomes severe before you take your medicine. Try to time your medicine so that you can take it before starting an activity. This might be before you get dressed, go for a walk, or sit down for dinner.  Constipation is a common side effect of some pain medicines. Call your healthcare provider before taking any medicines such as laxatives or stool softeners to help ease constipation. Also ask if you should skip any foods. Drinking lots of fluids and eating foods such as fruits and vegetables that are high in fiber can also help. Remember, don't take laxatives unless your surgeon has prescribed them.  Drinking alcohol and taking pain medicine can cause dizziness and slow your breathing. It can even be deadly. Don't drink alcohol while taking pain medicine.  Pain medicine can make you react more slowly to things. Don't drive or run machinery while taking pain medicine.  Your healthcare provider may tell you to take acetaminophen to help ease your pain. Ask them how much you're supposed to take each day. Acetaminophen or other pain relievers may  interact with your prescription medicines or other over-the-counter (OTC) medicines. Some prescription medicines have acetaminophen and other ingredients in them. Using both prescription and OTC acetaminophen for pain can cause you to accidentally overdose. Read the labels on your OTC medicines with care. This will help you to clearly know the list of ingredients, how much to take, and any warnings. It may also help you not take too much acetaminophen. If you have questions or don't understand the information, ask your pharmacist or healthcare provider to explain it to you before you take the OTC medicine.   Managing nausea  Some people have an upset stomach (nausea) after surgery. This is often because of anesthesia, pain, or pain medicine, less movement of food in the stomach, or the stress of surgery. These tips will help you handle nausea and eat healthy foods as you get better. If you were on a special food plan before surgery, ask your healthcare provider if you should follow it while you get better. Check with your provider on how your eating should progress. It may depend on the surgery you had. These general tips may help:   Don't push yourself to eat. Your body will tell you when to eat and how much.  Start off with clear liquids and soup. They're easier to digest.  Next try semi-solid foods as you feel ready. These include mashed potatoes, applesauce, and gelatin.  Slowly move to solid foods. Don’t eat fatty, rich, or spicy foods at first.  Don't force yourself to have 3 large meals a day. Instead eat smaller amounts more often.  Take pain medicines with a small amount of solid food, such as crackers or toast. This helps prevent nausea.  When to call your healthcare provider  Call your healthcare provider right away if you have any of these:   You still have too much pain, or the pain gets worse, after taking the medicine. The medicine may not be strong enough. Or there may be a complication from the  surgery.  You feel too sleepy, dizzy, or groggy. The medicine may be too strong.  Side effects such as nausea or vomiting. Your healthcare provider may advise taking other medicines to .  Skin changes such as rash, itching, or hives. This may mean you have an allergic reaction. Your provider may advise taking other medicines.  The incision looks different (for instance, part of it opens up).  Bleeding or fluid leaking from the incision site, and weren't told to expect that.  Fever of 100.4°F (38°C) or higher, or as directed by your provider.  Call 911  Call 911 right away if you have:   Trouble breathing  Facial swelling    If you have obstructive sleep apnea   You were given anesthesia medicine during surgery to keep you comfortable and free of pain. After surgery, you may have more apnea spells because of this medicine and other medicines you were given. The spells may last longer than normal.    At home:  Keep using the continuous positive airway pressure (CPAP) device when you sleep. Unless your healthcare provider tells you not to, use it when you sleep, day or night. CPAP is a common device used to treat obstructive sleep apnea.  Talk with your provider before taking any pain medicine, muscle relaxants, or sedatives. Your provider will tell you about the possible dangers of taking these medicines.  Contact your provider if your sleeping changes a lot even when taking medicines as directed.  Jie last reviewed this educational content on 10/1/2021  © 7552-1561 [employer]. All rights reserved. This information is not intended as a substitute for professional medical care. Always follow your healthcare professional's instructions.

## 2024-05-16 ENCOUNTER — HOSPITAL ENCOUNTER (OUTPATIENT)
Facility: HOSPITAL | Age: 89
Setting detail: HOSPITAL OUTPATIENT SURGERY
Discharge: HOME | End: 2024-05-16
Attending: UROLOGY | Admitting: UROLOGY

## 2024-05-16 ENCOUNTER — ANESTHESIA (OUTPATIENT)
Dept: SURGERY | Facility: HOSPITAL | Age: 89
End: 2024-05-16

## 2024-05-16 ENCOUNTER — APPOINTMENT (OUTPATIENT)
Dept: GENERAL RADIOLOGY | Facility: HOSPITAL | Age: 89
End: 2024-05-16
Attending: UROLOGY

## 2024-05-16 ENCOUNTER — ANESTHESIA EVENT (OUTPATIENT)
Dept: SURGERY | Facility: HOSPITAL | Age: 89
End: 2024-05-16

## 2024-05-16 ENCOUNTER — TELEPHONE (OUTPATIENT)
Dept: SURGERY | Facility: CLINIC | Age: 89
End: 2024-05-16

## 2024-05-16 VITALS
BODY MASS INDEX: 24.64 KG/M2 | OXYGEN SATURATION: 96 % | HEIGHT: 71 IN | TEMPERATURE: 98 F | SYSTOLIC BLOOD PRESSURE: 145 MMHG | WEIGHT: 176 LBS | HEART RATE: 80 BPM | RESPIRATION RATE: 16 BRPM | DIASTOLIC BLOOD PRESSURE: 82 MMHG

## 2024-05-16 DIAGNOSIS — D49.4 BLADDER TUMOR: ICD-10-CM

## 2024-05-16 PROCEDURE — 74420 UROGRAPHY RTRGR +-KUB: CPT | Performed by: UROLOGY

## 2024-05-16 PROCEDURE — 0TBB8ZZ EXCISION OF BLADDER, VIA NATURAL OR ARTIFICIAL OPENING ENDOSCOPIC: ICD-10-PCS | Performed by: UROLOGY

## 2024-05-16 PROCEDURE — 52235 CYSTOSCOPY AND TREATMENT: CPT | Performed by: UROLOGY

## 2024-05-16 PROCEDURE — 52332 CYSTOSCOPY AND TREATMENT: CPT | Performed by: UROLOGY

## 2024-05-16 DEVICE — URETERAL STENT
Type: IMPLANTABLE DEVICE | Site: URETER | Status: FUNCTIONAL
Brand: ASCERTA™

## 2024-05-16 RX ORDER — ACETAMINOPHEN 500 MG
1000 TABLET ORAL ONCE
Status: DISCONTINUED | OUTPATIENT
Start: 2024-05-16 | End: 2024-05-16 | Stop reason: HOSPADM

## 2024-05-16 RX ORDER — MORPHINE SULFATE 4 MG/ML
2 INJECTION, SOLUTION INTRAMUSCULAR; INTRAVENOUS EVERY 10 MIN PRN
Status: DISCONTINUED | OUTPATIENT
Start: 2024-05-16 | End: 2024-05-16

## 2024-05-16 RX ORDER — ONDANSETRON 2 MG/ML
INJECTION INTRAMUSCULAR; INTRAVENOUS AS NEEDED
Status: DISCONTINUED | OUTPATIENT
Start: 2024-05-16 | End: 2024-05-16 | Stop reason: SURG

## 2024-05-16 RX ORDER — HYDROMORPHONE HYDROCHLORIDE 1 MG/ML
0.2 INJECTION, SOLUTION INTRAMUSCULAR; INTRAVENOUS; SUBCUTANEOUS EVERY 5 MIN PRN
Status: DISCONTINUED | OUTPATIENT
Start: 2024-05-16 | End: 2024-05-16

## 2024-05-16 RX ORDER — OXYBUTYNIN CHLORIDE 5 MG/1
5 TABLET ORAL 3 TIMES DAILY PRN
Qty: 15 TABLET | Refills: 0 | Status: SHIPPED | OUTPATIENT
Start: 2024-05-16 | End: 2024-05-21

## 2024-05-16 RX ORDER — SODIUM CHLORIDE, SODIUM LACTATE, POTASSIUM CHLORIDE, CALCIUM CHLORIDE 600; 310; 30; 20 MG/100ML; MG/100ML; MG/100ML; MG/100ML
INJECTION, SOLUTION INTRAVENOUS CONTINUOUS
Status: DISCONTINUED | OUTPATIENT
Start: 2024-05-16 | End: 2024-05-16

## 2024-05-16 RX ORDER — MORPHINE SULFATE 4 MG/ML
4 INJECTION, SOLUTION INTRAMUSCULAR; INTRAVENOUS EVERY 10 MIN PRN
Status: DISCONTINUED | OUTPATIENT
Start: 2024-05-16 | End: 2024-05-16

## 2024-05-16 RX ORDER — NITROFURANTOIN 25; 75 MG/1; MG/1
100 CAPSULE ORAL 2 TIMES DAILY
Qty: 6 CAPSULE | Refills: 0 | Status: SHIPPED | OUTPATIENT
Start: 2024-05-16 | End: 2024-05-19

## 2024-05-16 RX ORDER — ROCURONIUM BROMIDE 10 MG/ML
INJECTION, SOLUTION INTRAVENOUS AS NEEDED
Status: DISCONTINUED | OUTPATIENT
Start: 2024-05-16 | End: 2024-05-16 | Stop reason: SURG

## 2024-05-16 RX ORDER — DEXAMETHASONE SODIUM PHOSPHATE 4 MG/ML
VIAL (ML) INJECTION AS NEEDED
Status: DISCONTINUED | OUTPATIENT
Start: 2024-05-16 | End: 2024-05-16 | Stop reason: SURG

## 2024-05-16 RX ORDER — IOPAMIDOL 612 MG/ML
INJECTION, SOLUTION INTRATHECAL AS NEEDED
Status: DISCONTINUED | OUTPATIENT
Start: 2024-05-16 | End: 2024-05-16 | Stop reason: HOSPADM

## 2024-05-16 RX ORDER — POLYETHYLENE GLYCOL 3350 17 G/17G
17 POWDER, FOR SOLUTION ORAL DAILY PRN
Qty: 30 EACH | Refills: 0 | Status: SHIPPED | OUTPATIENT
Start: 2024-05-16

## 2024-05-16 RX ORDER — SENNA AND DOCUSATE SODIUM 50; 8.6 MG/1; MG/1
1 TABLET, FILM COATED ORAL DAILY
Qty: 30 TABLET | Refills: 0 | Status: SHIPPED | OUTPATIENT
Start: 2024-05-16

## 2024-05-16 RX ORDER — HYDROMORPHONE HYDROCHLORIDE 1 MG/ML
0.6 INJECTION, SOLUTION INTRAMUSCULAR; INTRAVENOUS; SUBCUTANEOUS EVERY 5 MIN PRN
Status: DISCONTINUED | OUTPATIENT
Start: 2024-05-16 | End: 2024-05-16

## 2024-05-16 RX ORDER — DOCUSATE SODIUM 100 MG/1
100 CAPSULE, LIQUID FILLED ORAL 2 TIMES DAILY
Qty: 30 CAPSULE | Refills: 11 | Status: SHIPPED | OUTPATIENT
Start: 2024-05-16

## 2024-05-16 RX ORDER — HYDROMORPHONE HYDROCHLORIDE 1 MG/ML
0.4 INJECTION, SOLUTION INTRAMUSCULAR; INTRAVENOUS; SUBCUTANEOUS EVERY 5 MIN PRN
Status: DISCONTINUED | OUTPATIENT
Start: 2024-05-16 | End: 2024-05-16

## 2024-05-16 RX ORDER — MIDAZOLAM HYDROCHLORIDE 1 MG/ML
INJECTION INTRAMUSCULAR; INTRAVENOUS AS NEEDED
Status: DISCONTINUED | OUTPATIENT
Start: 2024-05-16 | End: 2024-05-16 | Stop reason: SURG

## 2024-05-16 RX ORDER — MORPHINE SULFATE 10 MG/ML
6 INJECTION, SOLUTION INTRAMUSCULAR; INTRAVENOUS EVERY 10 MIN PRN
Status: DISCONTINUED | OUTPATIENT
Start: 2024-05-16 | End: 2024-05-16

## 2024-05-16 RX ORDER — NALOXONE HYDROCHLORIDE 0.4 MG/ML
0.08 INJECTION, SOLUTION INTRAMUSCULAR; INTRAVENOUS; SUBCUTANEOUS AS NEEDED
Status: DISCONTINUED | OUTPATIENT
Start: 2024-05-16 | End: 2024-05-16

## 2024-05-16 RX ADMIN — CEFAZOLIN SODIUM/WATER 2 G: 2 G/20 ML SYRINGE (ML) INTRAVENOUS at 13:11:00

## 2024-05-16 RX ADMIN — ONDANSETRON 4 MG: 2 INJECTION INTRAMUSCULAR; INTRAVENOUS at 13:15:00

## 2024-05-16 RX ADMIN — ROCURONIUM BROMIDE 30 MG: 10 INJECTION, SOLUTION INTRAVENOUS at 13:07:00

## 2024-05-16 RX ADMIN — DEXAMETHASONE SODIUM PHOSPHATE 8 MG: 4 MG/ML VIAL (ML) INJECTION at 13:15:00

## 2024-05-16 RX ADMIN — MIDAZOLAM HYDROCHLORIDE 2 MG: 1 INJECTION INTRAMUSCULAR; INTRAVENOUS at 13:00:00

## 2024-05-16 NOTE — ANESTHESIA PROCEDURE NOTES
Airway  Date/Time: 5/16/2024 1:10 PM  Urgency: elective      General Information and Staff    Patient location during procedure: OR  Anesthesiologist: Adi Nicole MD, PhD  Performed: anesthesiologist   Performed by: Adi Nicole MD, PhD  Authorized by: Adi Nicole MD, PhD      Indications and Patient Condition  Indications for airway management: anesthesia  Sedation level: deep  Preoxygenated: yes  Patient position: sniffing  Mask difficulty assessment: 1 - vent by mask    Final Airway Details  Final airway type: endotracheal airway      Successful airway: ETT  Cuffed: yes   Successful intubation technique: Video laryngoscopy  Endotracheal tube insertion site: oral  Blade: GlideScope  Blade size: #3  ETT size (mm): 7.0    Cormack-Lehane Classification: grade IIA - partial view of glottis  Placement verified by: capnometry   Measured from: lips  ETT to lips (cm): 22  Number of attempts at approach: 1

## 2024-05-16 NOTE — H&P
PRE-OP NOTE     NAME/MRN/PROCEDURE: Rachelle Hunter -TURBT  HPI: Briefly, patient had a CTU and cystoscopy with Dr. Baker. Bladder lesions were reportedly noted around the right UO/trigone. CTU with 3-4mm right renal stone.  Patient has had what sounds like ESWL in the past. She is on Xarelto.  Please note that we do not have any imaging from the cystoscopy and I do not know size, number or location of bladder lesions.  PMH: CHF, aortic atherosclerosis, bcc, glaucoma, herpes zoster, nephrolithiasis, afib  PSH: appy, cataract, lithotripsy  MEDS: estradiol, latanoprost, xarelto  ALL: sulfa  LABS: Ucx neg, INR 1.15, Cr 0.99, WBC 5.5, plt 204  IMAGING:     OTHER:   CONSTITUTIONAL: No apparent distress, cooperative and communicative  NEUROLOGIC: Alert and oriented   HEAD: Normocephalic, atraumatic   EYES: Sclera non-icteric   ENT: Hearing intact, moist mucous membranes   NECK: No obvious goiter or masses   RESPIRATORY: Normal respiratory effort, Nonlabored breathing on room air  SKIN: No evident rashes   ABDOMEN: Soft, nontender, nondistended, no rebound tenderness, no guarding, no masses   ABX: ancef

## 2024-05-16 NOTE — ANESTHESIA POSTPROCEDURE EVALUATION
Patient: Rachelle Hunter    Procedure Summary       Date: 05/16/24 Room / Location: [facility] MAIN OR  / [facility] MAIN OR    Anesthesia Start: 1258 Anesthesia Stop: 1353    Procedure: Cystoscopy transurethral resection of bladder tumor, retrograde pyelogram, right ureteral stent placement (Ureter) Diagnosis:       Bladder tumor      (Bladder tumor [D49.4])    Surgeons: Lina Lundberg MD Anesthesiologist: Adi Nicole MD, PhD    Anesthesia Type: general ASA Status: 3            Anesthesia Type: No value filed.    Vitals Value Taken Time   /84 05/16/24 1352   Temp 98.2 05/16/24 1353   Pulse 79 05/16/24 1353   Resp 16 05/16/24 1353   SpO2 90 % 05/16/24 1353   Vitals shown include unfiled device data.    [facility] AN Post Evaluation:   Patient Evaluated in PACU  Patient Participation: complete - patient participated  Level of Consciousness: awake and alert  Pain Score: 2  Pain Management: adequate  Airway Patency:patent  Yes    Nausea/Vomiting: none  Cardiovascular Status: acceptable  Respiratory Status: acceptable  Postoperative Hydration acceptable      Adi Nicole MD, PhD  5/16/2024 1:53 PM

## 2024-05-16 NOTE — TELEPHONE ENCOUNTER
Needs catheter removal appoitnemtn with nursing on 5/28, no sooner.  Also needs appointment with me to discuss pathology    Will finally need a cystoscopy with stent removal in 2-3 weeks, no sooner.

## 2024-05-16 NOTE — OPERATIVE REPORT
OPERATIVE REPORT  DATE OF SURGERY: 5/16/2024  PREOPERATIVE DIAGNOSIS: Bladder tumor  POSTOPERATIVE DIAGNOSIS: Same  PROCEDURE: Cystoscopy, transurethral resection of bladder tumor, right retrograde pyelogram, stent placement   SURGEON: Lina Lundberg MD  ASSISTANT: none  ANESTHESIA: General ET tube  FLUIDS: Per anesthesia  URINE OUTPUT: Not applicable  ESTIMATED BLOOD LOSS: 3cc  SPECIMENS: bladder tumor  CONDITION: Stable to PACU    INDICATIONS: bladder tumor    PROCEDURE: The patient was taken to the operating room and given general anesthesia and then placed in yellowfin stirrups.  Patient received ancefantibiotic coverage before starting the case.    At this point, sounds were used to dilate the meatus to 30 Bulgarian.  The resectoscope passed easily in the meatus and into the bladder.  A full cystoscopy was performed which demonstrated a small flat papillary carpeting measuring 1cm area adjacent to the right UO. There was no other area of abnormality. We then used the resectocope loop to resect the bladder tumor in its entirity. We used the loop to fulgurate the area completely. With the bladder decompressed there was no further bleeding. As the resection was very close to the right UO, we decided to place a stent to ensure no issues with the ureteral orifice. A wire was placed into the kidney and a retrograde pyelogram was performed that demonstrated delicate calyces. A 6x26 stent was placed without issue. The specimens were removed and sent to pathology.  A barnes catheter was placed the urine was flushed and was light pink in color. The patient was then awakened and transported to the PACU in good condition.    IMPRESSION: Status post TURBT    PLAN:  1. Barnes catheter x 10 days  2. RTC at that time for pathology discussion   3. Stent x 2 weeks?

## 2024-05-16 NOTE — ANESTHESIA PREPROCEDURE EVALUATION
Anesthesia PreOp Note    HPI:     Rachelle Hunter is a 88 year old female who presents for preoperative consultation requested by: Lina Lundberg MD    Date of Surgery: 5/16/2024    Procedure(s):  Cystoscopy transurethral resection of bladder tumor  Indication: Bladder tumor [D49.4]    Relevant Problems   No relevant active problems       NPO:  Last Liquid Consumption Date: 05/15/24  Last Liquid Consumption Time: 2330  Last Solid Consumption Date: 05/15/24  Last Solid Consumption Time: 2330  Last Liquid Consumption Date: 05/15/24          History Review:  Patient Active Problem List    Diagnosis Date Noted    Cholelithiasis 04/09/2024    Atrial fibrillation with normal ventricular rate (HCC) 02/19/2024    Atrial fibrillation ([facility]) 02/19/2024    Orthostatic hypotension 11/14/2023    Epidermal cyst 11/09/2021    Left leg DVT ([facility]) 10/2021    Neoplasm of uncertain behavior of skin 04/26/2021    Sun-damaged skin 04/26/2021    Chronic kidney disease, stage 3 ([facility])     After-cataract obscuring vision, bilateral 08/20/2019    Left bundle branch block     History of cardioversion 01/23/2018    Chronic systolic heart failure ([facility]) 01/11/2018    Aortic atherosclerosis ([facility])     LV dysfunction     Mitral regurgitation     Pulmonary hypertension ([facility])     Atrial fibrillation with rapid ventricular response ([facility]) 12/31/2017    Other chest pain 12/31/2017    Pseudophakia of both eyes 12/14/2017    Actinic keratosis 10/20/2016    Vitreous floaters of both eyes 07/21/2015    Primary open angle glaucoma of both eyes, moderate stage 12/02/2014    Paroxysmal atrial fibrillation (HCC) 08/16/2014    Osteopenia 08/16/2014    Kidney stones 08/16/2014       Past Medical History:    Acute systolic CHF (congestive heart failure) ([facility])    Cardiac cath 1-18 with no obstructive CAD; Echo 1-18 with EF 35-40% ;Lexiscan GXT 6-22 with mild LVE, normal perfusion, EF 62%; Echo 2-24 with mild LVH, EF 30-35%, mild MINOR, mild MR    Aortic  atherosclerosis (HCC)    CXR 12-17    BCC (basal cell carcinoma)    right tip of nose    Cataract    Cholelithiasis    CT scan 4-24    Chronic kidney disease, stage 3 (HCC)    Glaucoma    2012- START Latanoprost qhs OS, based on OCT    Herpes zoster    Right leg    Kidney stones    Treated with lithotripsy    Left bundle branch block    Left leg DVT (HCC)    Partially occlusive thrombus left proximal femoral vein    LV dysfunction    Echo 1-18 with EF 35-40%; cardiac cath 1-18 with EF 40% and no obstructive CAD; Echo 7-20 with EF 40-45%, mild MR, mild TR    Mitral regurgitation    Echo 1-18 with moderate-severe MR and TR; JAI 1-18 with moderate-severe MR    Orthostatic hypotension    Osteopenia    T score -1.3 hip and -0.7 lumbar spine    Paroxysmal atrial fibrillation (HCC)    Nuclear treadmill stress test 7-15 normal with EF 57%; symptomatic recurrence s/p cardioversion 1-18 and 2-24; s/p AV node ablation 3-24 with pacemaker placement    PONV (postoperative nausea and vomiting)    Pulmonary hypertension (HCC)    Echo 1-18 with peak pulmonary artery pressure 44 mmHg       Past Surgical History:   Procedure Laterality Date    Appendectomy  07/2000    Ruptured appendicitis    Cardiac pacemaker placement      Cardioversion  01/2018    Symptomatic atrial fibrillation    Cardioversion  02/19/2024    Cataract extraction w/  intraocular lens implant Right 04/13/2017    Dr. Rogel @ [facility]    Cataract extraction w/  intraocular lens implant Left 06/2017    Dr. Rogel     Cath pacemaker implant dual  03/04/2024    Ep av node ablation  03/04/2024    Lithotripsy Left 06/2001    Other  07/2011    ORIF distal right radius fracture    Tubal ligation  1976       Facility-Administered Medications Prior to Admission   Medication Dose Route Frequency Provider Last Rate Last Admin    lidocaine (Urojet) 2 % urethral jelly 10 mL  10 mL Intravesical Once Marce Baker, DO         Medications Prior to Admission   Medication Sig Dispense  Refill Last Dose    clobetasol 0.05 % External Ointment Apply 1 Application topically twice a week. Twice weekly for maintenance, if experiencing flare: increase usage to 2x/daily for 2 weeks followed by daily for 2 weeks, then back to maintenance 2x/week 60 g 3     midodrine 5 MG Oral Tab Take 1 tablet (5 mg total) by mouth 2 (two) times daily before meals. 180 tablet 3 5/16/2024 at 0730    estradiol (ESTRACE) 0.1 MG/GM Vaginal Cream Apply 1/2 gram vaginally 2-3 times per week. 42 g 3     latanoprost 0.005 % Ophthalmic Solution INSTILL 1 DROP IN BOTH EYES AT BEDTIME 7.5 mL 3 5/15/2024 at 2330    XARELTO 20 MG Oral Tab Take 1 tablet (20 mg total) by mouth daily with food.   5/13/2024     Current Facility-Administered Medications Ordered in Epic   Medication Dose Route Frequency Provider Last Rate Last Admin    lactated ringers infusion   Intravenous Continuous Lina Lundberg MD        acetaminophen (Tylenol Extra Strength) tab 1,000 mg  1,000 mg Oral Once Lina Lundberg MD        ceFAZolin (Ancef) 2 g in 20mL IV syringe premix  2 g Intravenous Once Lina Lundberg MD         No current [facility]-ordered outpatient medications on file.       Allergies   Allergen Reactions    Sulfa Antibiotics HIVES       Family History   Problem Relation Age of Onset    Diabetes Father     Other (Pancreatic Cancer) Father     Glaucoma Mother     Other (Renal Cell Carcinoma) Brother     Crohn's Disease Brother     Glaucoma Paternal Aunt     Macular degeneration Paternal Aunt      Social History     Socioeconomic History    Marital status:    Tobacco Use    Smoking status: Never    Smokeless tobacco: Never   Vaping Use    Vaping status: Never Used   Substance and Sexual Activity    Alcohol use: Not Currently     Alcohol/week: 0.8 standard drinks of alcohol     Types: 1 Glasses of wine per week    Drug use: Never   Other Topics Concern    Caffeine Concern Yes     Comment: 24oz soda daily    Pt has a pacemaker No    Pt has a  defibrillator No    Reaction to local anesthetic No       Available pre-op labs reviewed.  Lab Results   Component Value Date    WBC 5.5 04/27/2024    RBC 4.71 04/27/2024    HGB 14.1 04/27/2024    HCT 43.7 04/27/2024    MCV 92.8 04/27/2024    MCH 29.9 04/27/2024    MCHC 32.3 04/27/2024    RDW 13.5 04/27/2024    .0 04/27/2024     Lab Results   Component Value Date     04/27/2024    K 3.9 04/27/2024     04/27/2024    CO2 28.0 04/27/2024    BUN 14 04/27/2024    CREATSERUM 0.99 04/27/2024     (H) 04/27/2024    CA 9.5 04/27/2024     Lab Results   Component Value Date    INR 1.15 04/27/2024       Vital Signs:  Body mass index is 24.55 kg/m².   height is 1.803 m (5' 11\") and weight is 79.8 kg (176 lb). Her oral temperature is 97.5 °F (36.4 °C). Her blood pressure is 141/85 and her pulse is 80. Her respiration is 18 and oxygen saturation is 97%.   Vitals:    05/13/24 1132 05/16/24 1134   BP:  141/85   Pulse:  80   Resp:  18   Temp:  97.5 °F (36.4 °C)   TempSrc:  Oral   SpO2:  97%   Weight: 81.6 kg (180 lb) 79.8 kg (176 lb)   Height: 1.803 m (5' 11\") 1.803 m (5' 11\")        Anesthesia Evaluation     Patient summary reviewed    History of anesthetic complications   Airway   Mallampati: II  Dental - Dentition appears grossly intact     Pulmonary - normal exam   Cardiovascular   (+) CHF    Rhythm: irregular  Rate: normal    Neuro/Psych      GI/Hepatic/Renal      Endo/Other    Abdominal  - normal exam            Pt has a ICD - due to Afib     Anesthesia Plan:   ASA:  3  Plan:   General  Airway:  ETT  Informed Consent Plan and Risks Discussed With:  Patient      I have informed Rachelle DE LA TORRE Young and/or legal guardian or family member of the nature of the anesthetic plan, benefits, risks including possible dental damage if relevant, major complications, and any alternative forms of anesthetic management.   All of the patient's questions were answered to the best of my ability. The patient desires the  anesthetic management as planned.  Adi Nicole MD, PhD  5/16/2024 12:54 PM  Present on Admission:  **None**

## 2024-05-17 NOTE — TELEPHONE ENCOUNTER
Spoke with patient, assisted in scheduling nurse visit, pathology discussion and cystoscopy with stent removal. Patient aware to arrive at 1:15 the day of stent removal.

## 2024-05-21 ENCOUNTER — TELEPHONE (OUTPATIENT)
Dept: SURGERY | Facility: CLINIC | Age: 89
End: 2024-05-21

## 2024-05-21 NOTE — TELEPHONE ENCOUNTER
I s/w pt and told her that her blood in her urine may not clear until the stent is removed. The blood can be intermittent also. She was thankful for the call.         5/16 surgery  Panel 1    Surgeon Role   Lina Lundberg MD Primary    Procedure Laterality Anesthesia   Cystoscopy transurethral resection of bladder tumor, retrograde pyelogram, right ureteral stent placemen

## 2024-05-28 ENCOUNTER — OFFICE VISIT (OUTPATIENT)
Dept: SURGERY | Facility: CLINIC | Age: 89
End: 2024-05-28

## 2024-05-28 DIAGNOSIS — D49.4 BLADDER TUMOR: Primary | ICD-10-CM

## 2024-05-28 DIAGNOSIS — N20.0 NEPHROLITHIASIS: ICD-10-CM

## 2024-05-28 PROCEDURE — 99214 OFFICE O/P EST MOD 30 MIN: CPT | Performed by: UROLOGY

## 2024-05-28 NOTE — PROGRESS NOTES
Lina Lundberg MD  Department of Urology  [address] [place] [address] Rd[address]  [place] [address]    T: [phone]  F: [phone]    To: Stoney Darnell MD   [facility] [address]    Re: Rachelle Hunter   MRN: [id number]  : 1935    Dear Stoney Darnell MD,    Today I had the pleasure of seeing Rachelle Hunter in my clinic. As you know, Ms. Hunter is a pleasant 88 year old year old female who I am seeing for pathology fu. Patient was last seen in this department on 2024.    Briefly, patient was found to have a small papillary area posterior to the right UO. She underwent TURBT on . She presents for voiding trial and pathology discussion.    Final Diagnosis:      Bladder tumor; transurethral resection:   Low-grade papillary urothelial carcinoma, non-invasive.  Muscularis propria is present; no evidence of invasion identified.   See comment.     Comment:  For  purposes, this case was reviewed by a second pathologist who concurred with the diagnosis.          PAST MEDICAL HISTORY:  Past Medical History:    Acute systolic CHF (congestive heart failure) (HCC)    Cardiac cath 1-18 with no obstructive CAD; Echo 1-18 with EF 35-40% ;Lexiscan GXT 6-22 with mild LVE, normal perfusion, EF 62%; Echo 2-24 with mild LVH, EF 30-35%, mild MINOR, mild MR    Aortic atherosclerosis (HCC)    CXR 12-17    BCC (basal cell carcinoma)    right tip of nose    Cataract    Cholelithiasis    CT scan 4-24    Chronic kidney disease, stage 3 (HCC)    Glaucoma    2012- START Latanoprost qhs OS, based on OCT    Herpes zoster    Right leg    Kidney stones    Treated with lithotripsy    Left bundle branch block    Left leg DVT (HCC)    Partially occlusive thrombus left proximal femoral vein    LV dysfunction    Echo 1-18 with EF 35-40%; cardiac cath 1-18 with EF 40% and no obstructive CAD; Echo 7-20 with EF 40-45%, mild MR, mild TR    Mitral regurgitation    Echo 1-18 with moderate-severe MR and TR;  JAI 1-18 with moderate-severe MR    Orthostatic hypotension    Osteopenia    T score -1.3 hip and -0.7 lumbar spine    Paroxysmal atrial fibrillation (HCC)    Nuclear treadmill stress test 7-15 normal with EF 57%; symptomatic recurrence s/p cardioversion 1-18 and 2-24; s/p AV node ablation 3-24 with pacemaker placement    PONV (postoperative nausea and vomiting)    Pulmonary hypertension (HCC)    Echo 1-18 with peak pulmonary artery pressure 44 mmHg        PAST SURGICAL HISTORY:  Past Surgical History:   Procedure Laterality Date    Appendectomy  07/2000    Ruptured appendicitis    Cardiac pacemaker placement      Cardioversion  01/2018    Symptomatic atrial fibrillation    Cardioversion  02/19/2024    Cataract extraction w/  intraocular lens implant Right 04/13/2017    Dr. Rogel @ [facility]    Cataract extraction w/  intraocular lens implant Left 06/2017    Dr. Rogel     Cath pacemaker implant dual  03/04/2024    Ep av node ablation  03/04/2024    Lithotripsy Left 06/2001    Other  07/2011    ORIF distal right radius fracture    Tubal ligation  1976         ALLERGIES:  Allergies   Allergen Reactions    Sulfa Antibiotics HIVES         MEDICATIONS:  Current Outpatient Medications   Medication Instructions    clobetasol 0.05 % External Ointment 1 Application, Topical, Twice weekly, Twice weekly for maintenance, if experiencing flare: increase usage to 2x/daily for 2 weeks followed by daily for 2 weeks, then back to maintenance 2x/week    docusate sodium (COLACE) 100 mg, Oral, 2 times daily    estradiol (ESTRACE) 0.1 MG/GM Vaginal Cream Apply 1/2 gram vaginally 2-3 times per week.    latanoprost 0.005 % Ophthalmic Solution INSTILL 1 DROP IN BOTH EYES AT BEDTIME    midodrine (PROAMATINE) 5 mg, Oral, 2 times daily before meals    polyethylene glycol (PEG 3350) (MIRALAX) 17 g, Oral, DAILY PRN    senna-docusate 8.6-50 MG Oral Tab 1 tablet, Oral, Daily    Xarelto 20 mg, Oral, Daily with food        FAMILY HISTORY:  Family  History   Problem Relation Age of Onset    Diabetes Father     Other (Pancreatic Cancer) Father     Glaucoma Mother     Other (Renal Cell Carcinoma) Brother     Crohn's Disease Brother     Glaucoma Paternal Aunt     Macular degeneration Paternal Aunt         SOCIAL HISTORY:  Social History     Socioeconomic History    Marital status:    Tobacco Use    Smoking status: Never    Smokeless tobacco: Never   Vaping Use    Vaping status: Never Used   Substance and Sexual Activity    Alcohol use: Not Currently     Alcohol/week: 0.8 standard drinks of alcohol     Types: 1 Glasses of wine per week    Drug use: Never   Other Topics Concern    Caffeine Concern Yes     Comment: 24oz soda daily    Pt has a pacemaker No    Pt has a defibrillator No    Reaction to local anesthetic No     Social Determinants of Health     Financial Resource Strain: Low Risk  (2/21/2024)    Financial Resource Strain     Difficulty of Paying Living Expenses: Not hard at all     Med Affordability: No   Food Insecurity: No Food Insecurity (3/4/2024)    Food Insecurity     Food Insecurity: Never true   Transportation Needs: No Transportation Needs (3/4/2024)    Transportation Needs     Lack of Transportation: No   Housing Stability: Low Risk  (3/4/2024)    Housing Stability     Housing Instability: No          PHYSICAL EXAMINATION:  There were no vitals filed for this visit.  CONSTITUTIONAL: No apparent distress, cooperative and communicative  NEUROLOGIC: Alert and oriented   HEAD: Normocephalic, atraumatic   EYES: Sclera non-icteric   ENT: Hearing intact, moist mucous membranes   NECK: No obvious goiter or masses   RESPIRATORY: Normal respiratory effort, Nonlabored breathing on room air  SKIN: No evident rashes   ABDOMEN: Soft, nontender, nondistended, no rebound tenderness, no guarding, no masses      REVIEW OF SYSTEMS:    A comprehensive 10-point review of systems was completed.  Pertinent positives and negatives are noted in the the HPI.        LABORATORY DATA:  Final Diagnosis:      Bladder tumor; transurethral resection:   Low-grade papillary urothelial carcinoma, non-invasive.  Muscularis propria is present; no evidence of invasion identified.   See comment.     Comment:  For  purposes, this case was reviewed by a second pathologist who concurred with the diagnosis.     URINE CULTURE 10-50,000 cfu/ml Multiple species present-probable contamination.           Resulting Agency: [facility] ([facility])                 Component  Ref Range & Units 4/27/24  8:11 AM   PT  11.6 - 14.8 seconds 15.4 High    Comment: Elevations of the PT and/or INR in patients not  receiving anticoagulant therapy may be seen in  factor deficiency, vitamin K deficiency, liver  disease, etc. Clinical correlation is recommended.     INR  0.80 - 1.20 1.15        WBC  4.0 - 11.0 x10(3) uL 5.5   RBC  3.80 - 5.30 x10(6)uL 4.71   HGB  12.0 - 16.0 g/dL 14.1   HCT  35.0 - 48.0 % 43.7   MCV  80.0 - 100.0 fL 92.8   MCH  26.0 - 34.0 pg 29.9   MCHC  31.0 - 37.0 g/dL 32.3   RDW  11.0 - 15.0 % 13.5   RDW-SD  35.1 - 46.3 fL 46.5 High    PLT  150.0 - 450.0 10(3)uL 204.0             IMAGING REVIEW:  PROCEDURE: CT UROGRAM(W+WO) W/3D SH(CPT=74178/69405)     COMPARISON: None.     INDICATIONS: Evaluate known nonobstructing 5mm right midpole renal calculus and multiple left cysts.     TECHNIQUE:   CT images were created of the abdomen and pelvis without and with intravenous non-ionic contrast followed by multiplane 3d MIP imaging and 3D volumetric imaging of delays were performed for a CT urogram.  Dose reduction techniques were used.     Dose information is transmitted to the ACR (American College of Radiology) NRDR (National Radiology Data Registry) which includes the Dose Index Registry.     3-D RENDERING:Three dimensional image processing was completed using a separate workstation under concurrent supervision. Images were archived.     FINDINGS:  KIDNEYS: 4 mm nonobstructing stone  in a mid pole calyx of the right kidney.  Scattered subcentimeter cortical based low density foci within the kidneys measuring up to 7 mm in diameter. Kidneys demonstrate symmetric size, enhancement, and excretion  without hydronephrosis or enhancing mass lesion.  ADRENALS: No nodule or enlargement.  URINARY BLADDER: The urinary bladder is distended with urine and excreted IV contrast material without wall thickening or stones.  LIVER: No enlargement, atrophy, abnormal density, or significant focal lesion.    BILIARY: 24 mm stone within the gallbladder.  Additional smaller calculi are also present in the gallbladder.  Gallbladder is incompletely distended.  No significant intra or extrahepatic biliary ductal dilatation.  PANCREAS: No lesion, fluid collection, ductal dilatation, or atrophy.    SPLEEN: No enlargement or focal lesion.    AORTA/VASCULAR: No aortic aneurysm or dissection.  RETROPERITONEUM: No mass or adenopathy.    BOWEL/MESENTERY: There is no small or large bowel obstruction. The terminal ileum is within normal limits. The appendix is not identified and there is history of appendectomy. No inflammatory changes around the cecum. There is scattered sigmoid colonic  diverticulosis scattered diverticula in the distal ileum.. There is no free air, free fluid, or lymphadenopathy in the abdomen or pelvis.  ABDOMINAL WALL: Tiny fat containing umbilical hernia.  No suspicious mass lesion.  PELVIC NODES: No adenopathy.    PELVIC ORGANS: Anteverted uterus.  Endometrium is prominent measuring 17 mm in diameter.  Normal CT appearance of the ovaries.  BONES: Moderate endplate change and disc disease throughout the spine.  Moderate probable chronic compression deformity at T9 which has lost approximately 50% of normal height.  No retropulsion or suspicious bone lesion at this level.  LUNG BASES: The visualized heart is enlarged.  There are coronary artery calcifications and cardiac pacing electrodes are present.   Dependent subsegmental atelectasis in the lung bases.  6 mm diameter left lower lobe pulmonary nodule.  It is on series 5,   image 12.  OTHER: Negative.                Impression  CONCLUSION:  1.  4 mm nonobstructing right renal stone.  No additional calculi in the kidneys, ureters, or bladder.  No hydroureteronephrosis.  Subcentimeter probable simple renal cysts are present.  2.  24 mm diameter stone in the gallbladder.  No evidence of acute cholecystitis or biliary ductal dilatation.  3.  Post appendectomy.  Scattered diverticulosis in the distal ileum and large bowel.  No bowel obstruction or inflammation.  4.  Endometrium is thickened at 17 mm.  Correlate with pelvic ultrasound performed 4/8/24 but dictated separately.  5.  Chronic T9 compression deformed which has lost approximately 50% of normal height.  6.  Cardiomegaly.  Coronary atherosclerosis.  7.  6 mm left lower lobe pulmonary nodule.  Recommend follow-up CT chest to assess for additional nodules.                Dictated by (CST): Ricardo Briceno MD on 4/08/2024 at 5:09 PM      Finalized by (CST): Ricardo Briceno MD on 4/08/2024 at 5:17 PM           OTHER RELEVANT DATA:   none     IMPRESSION: LgTa s/p TURBT on 5/16/2024 (CTU with 4mm nonobstructing right renal stone - patient opted for observation at initial visit). Will plan for cystoscopy and cytology in 3 months.    We discussed her pathology in detail including the chance of recurrence and progression. She understands the importance of surveillance cystoscopies.    Stent removal next week      We will plan for discussing RBUS in 6 months to evaluate her kidney stone. Please see discussion from last visit. She knows that if she has flank pain and fevers, she must go to the ER from last visit.     PLAN:  Cystoscopy 3mo  Stent to be removed next week  Cytology 3mo  RBUS to be discussed for 6 months if patient still opts for observation of kidney stone    Thank you for referring this very pleasant patient to  my clinic. If you have any questions or concerns, please do not hesitate to contact me.    Sincerely,  Lina Lundberg MD    30 minutes were spent on this patient at this visit obtaining a history, reviewing medical records, developing a treatment plan, counseling and discussing treatment strategy with patient, coordination of care and documentation.     The 21st Century Cures Act makes medical notes available to patients in the interest of transparency.  However, please be advised that this is a medical document.  It is intended as a peer to peer communication.  It is written in medical language and may contain abbreviations or verbiage that are technical and unfamiliar.  It may appear blunt or direct.  Medical documents are intended to carry relevant information, facts as evident, and the clinical opinion of the practitioner.

## 2024-05-28 NOTE — PROGRESS NOTES
-Pt presents for NV @ 11am for barnes removal; identity verified with name & ; procedure explained.  -Pt positions self on exam table; draped for privacy to lower shorts/ underwear to mid-thigh; remove stat-lock; aspirate 8ml from balloon prime & slowly remove barnes; pt cleaned area & redressed.  -/81; P=80.  -Pt left stable ambulatory w/o assist device to return @ 1pm for OV w/ AR.  -Encounter complete.

## 2024-05-31 ENCOUNTER — TELEPHONE (OUTPATIENT)
Dept: SURGERY | Facility: CLINIC | Age: 89
End: 2024-05-31

## 2024-05-31 NOTE — TELEPHONE ENCOUNTER
Pt called.  Surgery on 5-16-24.  Removed catheter at appointment 5-28-24.  Pt is having blood in the urine.  Call transferred to the nurse.

## 2024-05-31 NOTE — TELEPHONE ENCOUNTER
-Call-Center transferred pt to Urology; identity verified with name & .  -Pt is concerned her urine is color of [place] punch in the a.m.  -She acknowledges the color transitions to light pink as she rehydrates during waking hours.  -I reinforced to pt when hydrating to avoid carbonated beverages/ caffeine/ alcohol.  -On 24, she had Cysto in Surgery.  Pt stopped Xarelto -; resumed 24.  I explained this Rx contributes to her hematuria, as does her stent, which may last up to a month.  -On 24 she is scheduled for office procedure with AR to remove ureteral stent.  -Pt encouraged to call Urology with add'l questions.  -Encounter complete.

## 2024-06-04 ENCOUNTER — PROCEDURE (OUTPATIENT)
Dept: SURGERY | Facility: CLINIC | Age: 89
End: 2024-06-04
Payer: MEDICARE

## 2024-06-04 VITALS — DIASTOLIC BLOOD PRESSURE: 83 MMHG | SYSTOLIC BLOOD PRESSURE: 146 MMHG | HEART RATE: 80 BPM

## 2024-06-04 DIAGNOSIS — D49.4 BLADDER TUMOR: Primary | ICD-10-CM

## 2024-06-04 PROCEDURE — 52310 CYSTOSCOPY AND TREATMENT: CPT | Performed by: UROLOGY

## 2024-06-04 RX ORDER — CIPROFLOXACIN 500 MG/1
500 TABLET, FILM COATED ORAL ONCE
Status: SHIPPED | OUTPATIENT
Start: 2024-06-04

## 2024-06-04 RX ORDER — CIPROFLOXACIN 500 MG/1
500 TABLET, FILM COATED ORAL ONCE
Status: COMPLETED | OUTPATIENT
Start: 2024-06-04 | End: 2024-06-04

## 2024-06-04 RX ADMIN — CIPROFLOXACIN 500 MG: 500 TABLET, FILM COATED ORAL at 15:10:00

## 2024-06-04 NOTE — PROCEDURES
Cysto stent removal procedure  note  CYSTOSCOPY STENT REMOVAL     PRE-OP DIAGNOSIS: resected UO      POST-OP DIAGNOSIS: Same     PROCEDURE:  1. Cystoscopy, removal of right ureteral stent     SURGEON: Lina Lundberg MD     ASSISTANT: none     EBL: minimal     FINDINGS:  1.  Stent removed in its entirety stent , care taken please see Provation     INDICATIONS: s/p stent removal         PROCEDURE: Patient was brought to the procedure suite and an time-out was performed identifiying the patient,  and procedure being performed. The risks of the procedure were once again detailed to patient including bleeding, infection, dysuria. The patient agreed to proceed. cipro was given as antibiotic prophylaxis.     Patient was placed in supine position on the table and a flexible cystoscope was inserted per urethra after lidojet had been instilled for 5 minutes.  We immediately saw the ureteral stent in the bladder emanating from the rightureteral orifice.  We used a stent grasper to grasp the distal coil of the stent and removed it from the body in its entirety.  A picture of the intact stent was taken for documentation purposes in Provation.      There were no complications after this procedure and the patient tolerated the procedure without issue.     IMPRESSION: s/p stent removal      We will have the patient return in 6 weeks for follow-up with a renal bladder ultrasound to ensure he does not have silent hydronephrosis     PLAN:   1. RBUS 6 weeks

## 2024-07-23 ENCOUNTER — HOSPITAL ENCOUNTER (OUTPATIENT)
Dept: ULTRASOUND IMAGING | Facility: HOSPITAL | Age: 89
Discharge: HOME | End: 2024-07-23
Attending: UROLOGY
Payer: MEDICARE

## 2024-07-23 DIAGNOSIS — D49.4 BLADDER TUMOR: ICD-10-CM

## 2024-07-23 PROCEDURE — 76770 US EXAM ABDO BACK WALL COMP: CPT | Performed by: UROLOGY

## 2024-08-09 ENCOUNTER — OFFICE VISIT (OUTPATIENT)
Dept: UROLOGY | Facility: HOSPITAL | Age: 89
End: 2024-08-09
Attending: OBSTETRICS & GYNECOLOGY
Payer: MEDICARE

## 2024-08-09 VITALS — RESPIRATION RATE: 18 BRPM | HEIGHT: 71 IN | BODY MASS INDEX: 25.48 KG/M2 | WEIGHT: 182 LBS

## 2024-08-09 DIAGNOSIS — N81.84 PELVIC MUSCLE WASTING: ICD-10-CM

## 2024-08-09 DIAGNOSIS — N95.2 POSTMENOPAUSAL ATROPHIC VAGINITIS: ICD-10-CM

## 2024-08-09 DIAGNOSIS — N90.4 VULVAR DYSTROPHY: Primary | ICD-10-CM

## 2024-08-09 PROCEDURE — 99212 OFFICE O/P EST SF 10 MIN: CPT

## 2024-08-09 NOTE — PROGRESS NOTES
She is s/p Post-Op Summary  Procedure Date: 05/06/24 (5/16/24 surgery done per Dr Lundberg Transurethral resection of bladder tumor Right retrograde pyelogamand stent placement)  Procedure Name: Other (Please specify in comments) (hysterscopy with D+C  TruClear Polypectomy      6/4/24 stent removal)  Do you feel your surgery was successful?: Very successful  Compared to before surgery are you?: Much better  If you could go back to before your surgery, would you do it all over again?: Definitely yes  How satisfied are you with the results of your surgery?: Completely satisfied    Doing well   no complaints  Voids freely  No UTIs  BMs reg  No Pain  Tolerates ambulation & diet  No leakage    D&C path - Endometrial polyp and curettings:     Small fragments of endometrial polyp.  Small fragments of ectocervical mucosa.  No glandular atypia or malignancy identified    Saw urology, +Bladder tumor; transurethral resection:   Low-grade papillary urothelial carcinoma, non-invasive.  Muscularis propria is present; no evidence of invasion identified  Following w/ urology.  Surveillance per urology team    Patient presents to follow up UGA, vulvar dystrophy    She is currently using vag estrogen & clobetasol    She reports +improvement   Happy    Resp 18   Ht 71\"   Wt 182 lb (82.6 kg)   BMI 25.38 kg/m²     GEN: NAD  CV: RRR  Pulm: nl effort  Abd: soft    PELVIC EXAM:  Ext. Gen: +atrophy, no lesions, lichenification at union of labial minora/majora  Urethra: +atrophy, nontender  Bladder:+fullness, nontender  Vagina: +atrophy, vag discharge  Cervix: no bleeding, no lesions, nontender  Uterus: +mobile  Adnexa:no masses, nontender  Perineum: nontender, some lichenification  Anus: wnl  Rectum: defer     PELVIS FLOOR NEUROMUSCULAR FUNCTION:  Strength:  1 and Unable to hold greater than 3 sec  Perineal Sensation:  Normal        PELVIC SUPPORT:  [place]:  1  Ant:  1  Post:  1  CST:  negative  UVJ: somewhat  hypermobile    Impression/Plan:    ICD-10-CM    1. 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 Pelvic muscle wasting  N81.84           Discussion Items:   Discussed mgmt of vulvovaginal atrophy with vaginal estrogen cream. Reviewed associated benefits, risks, alternatives, and goals. Recommend low dose twice weekly mgmt     Discussed management options for vulvar dystrophy. Discussed chronic nature of symptoms. Discussed steroid treatments and vulvar care, discussed associated treatment risks and benefits.   Discussed need for future vulvar biopsy if sx persist despite mgmt as prescribed.       All questions answered  She understands and agrees to plan    Return in about 4 months (around 12/9/2024).    Marce Baker, , FACOG, FACS    The 21st Century Cures Act makes medical notes like these available to patients in the interest of transparency. However, be advised this is a medical document. It is intended as peer to peer communication. It is written in medical language and may contain abbreviations or verbiage that are unfamiliar. It may appear blunt or direct. Medical documents are intended to carry relevant information, facts as evident, and the clinical opinion of the practitioner.

## 2024-08-31 ENCOUNTER — LAB ENCOUNTER (OUTPATIENT)
Dept: LAB | Facility: HOSPITAL | Age: 89
End: 2024-08-31
Attending: UROLOGY
Payer: MEDICARE

## 2024-08-31 DIAGNOSIS — D49.4 BLADDER TUMOR: ICD-10-CM

## 2024-08-31 PROCEDURE — 88108 CYTOPATH CONCENTRATE TECH: CPT

## 2024-09-03 ENCOUNTER — PROCEDURE (OUTPATIENT)
Dept: SURGERY | Facility: CLINIC | Age: 89
End: 2024-09-03
Payer: MEDICARE

## 2024-09-03 VITALS — HEART RATE: 70 BPM | SYSTOLIC BLOOD PRESSURE: 169 MMHG | DIASTOLIC BLOOD PRESSURE: 88 MMHG | RESPIRATION RATE: 16 BRPM

## 2024-09-03 DIAGNOSIS — D49.4 BLADDER TUMOR: Primary | ICD-10-CM

## 2024-09-03 LAB — NON GYNE INTERPRETATION: NEGATIVE

## 2024-09-03 PROCEDURE — 52000 CYSTOURETHROSCOPY: CPT | Performed by: UROLOGY

## 2024-09-03 NOTE — PROCEDURES
CYSTOSCOPY (FEMALE)    PRE-OP DIAGNOSIS: LgTa    POST-OP DIAGNOSIS: same    PROCEDURE: Cystsocopy    SURGEON: Lina Lundberg MD    ASSISTANT: none     EBL: minimal    FINDINGS:   Urethra: No urethral lesions, no urethral strictures  Bladder: Bilateral ureteral orifices in orthotopic position with efflux, no suspicious or concerning erythematous lesions, no papillary bladder tumors, no stones   Retroflexion: no abnormalities   Other findings: none    INDICATIONS: LgTa TURBT on 2024.    PROCEDURE: Patient was brought to the procedure suite and a timeout was performed identifying the patient,  and procedure being performed.  The risks of the procedure were once again detailed to the patient including bleeding, infection, dysuria.  The patient agreed to proceed.  The patient had a negative urinalysis.  No antibiotics were given to patient prior to this procedure.     She was placed in a supine position on the table and a flexible cystoscope was inserted per urethra.  There were no obvious urethral lesions or strictures. Once in the bladder we performed a full diagnostic/surveillance cystoscopy which demonstrated no abnormalities.  On retroflexion we noted no abnormalities.  The scope was then carefully removed and once again no urethral abnormalities were noted.    There were no complications after this procedure and the patient tolerated the procedure without issue.    IMPRESSION: LgTa with cystoscopy negative! Will plan for repeat cystoscopy and cytology in 6 months.     For a low-risk patient whose first surveillance cystoscopy is negative for tumor, a clinician should perform subsequent surveillance cystoscopy six to nine months later, and then annually thereafter; surveillance after five years in the absence of recurrence should be based on shared-decision making between the patient and clinician.    RBUS negative. Patient with known nonobstructing stone. She knows to monitor for flank pain fevers,  chills and to go to the ER if these develop.    PLAN:    Cystoscopy 6 months months  Cytology 6 months  Stone prevention  RBUS 6 months, can see us sooner if would like to have stone treated/managed

## 2024-09-13 ENCOUNTER — TELEPHONE (OUTPATIENT)
Dept: SURGERY | Facility: CLINIC | Age: 89
End: 2024-09-13

## 2024-09-13 DIAGNOSIS — N20.0 NEPHROLITHIASIS: Primary | ICD-10-CM

## 2024-09-13 NOTE — TELEPHONE ENCOUNTER
Hattie from central scheduling requesting order for kidney ultrasound.Please advise      Occupational Therapy Visit     Referred by: Stalin Nelson III, MD; Medical Diagnosis (from order):    Diagnosis Information      Diagnosis    813.05 (ICD-9-CM) - S52.121A (ICD-10-CM) - Closed displaced fracture of head of right radius, initial encounter    719.54 (ICD-9-CM) - M25.641 (ICD-10-CM) - Finger stiffness, right    719.53 (ICD-9-CM) - M25.631 (ICD-10-CM) - Stiffness of right wrist joint              Visit: 14    Visit Type: Daily Treatment Note    SUBJECTIVE                                                                                                               Pt reports elbow is feeling much better. Has no pain at this moment. She is still performing HEP daily and feels she is improving.   Functional Change: Able to open cans or bottle tops   Can do hair care with greater ease  Able to do hair hygiene and styling  Able to use right hand for self feeding and meal prep  Able to do some dishes as long as they are not too heavy  Can reach to opposite shoulder and axilla for self cares   Current Functional Limitations: Unable to lift heavier object such as garbage or large pots/pans  Unable to reach to back for bathing  Unable to use vacuum   Difficulty opening tight jars/containers   Unable to perform work duties at this time  Pain / Symptoms:  Pain rating (out of 10): Current: 0   Location: Lateral elbow and adjacent tissues to scar from surgery     OBJECTIVE                                                                                                                     Range of Motion (ROM)   (degrees unless noted; active unless noted; norms in ( ); negative=lacking to 0, positive=beyond 0)   Elbow/Forearm:     - Flexion (140-150):        • Right: 130 pain Passive: 135 pain    - Extension (0):        • Right: 20 pain Passive: 10 pain    - Supination (80):        • Right: 65 pain Passive: 70 pain    Strength  (out of 5 unless noted, standard test position unless noted, lbs tested with hand  held dynamometer)   : (lbs)    - Neutral:        • Right: Trial(s): 30, 31, 35, Average: 32        Outcome Measures:   Quick Disabilities of the Arm, Shoulder and Hand: QuickDash Total Score (Score will not calculate if more then 2 questions are left blank): 43.18  (scored 0-100; a higher score indicates greater disability) see flowsheet for additional documentation    TREATMENT                                                                                                                initial evaluation completed    Therapeutic Exercise:  AAROM elbow flexion, light over pressure at tolerable end range 2x5, hold 15 seconds  AAROM elbow extension, light over pressure at tolerable end range 2x5, hold 15 seconds  AAROM forearm supination, light over pressure at tolerable end range 2x5, hold 15 seconds  AROM elbow flexion/extension forearm neutral and supinated (as best she can) 2x10 - performed in mirror for vc to avoid shoulder protraction with extension which was much better with visual cue.   AROM forearm pro/sup 2x10  35-55# walker gripper 4x20 - 3 sets at 55#    Manual Therapy:  STM distal biceps, brachialis, brachioradialis, dorsal/volar forearm, triceps    Skilled input: verbal instruction/cues and tactile instruction/cues    Writer verbally educated and received verbal consent for hand placement, positioning of patient, and techniques to be performed today from patient for clothing adjustments for techniques and therapist position for techniques as described above and how they are pertinent to the patient's plan of care.    Home Exercise Program/Education Materials: AROM supine elbow flexion/extension 1x15, 5x/day, daily - hold 5 seconds at end range flexion at extension  AROM forearm pronation/supination 1x15, 5x/day, daily - shoulder fully adducted   AROM wrist flex/ext, RD/UD, circumduction 1x15, 5x/day, daily  Yellow putty composite [id number], 3x/day - increase to 1x10 each set as able      ASSESSMENT                                                                                                              Pt tolerated therapy well this date. Active flexion now 135, passive supination now 70 degrees, and passive extension now 10 degrees. She continues to have small improvements in elbow range of motion and  strength. She will see MD for follow up in 3 days and depending on their recommendation we may be able to start strength training at the elbow and shoulder starting next week.   Pain/symptoms after session (out of 10): 1  To date the patient has made gains as expected as reported. Patient continues to have impairments and functional deficits as noted.  Patient will continue to benefit from skilled care as outlined.  Patient Education:   Results of above outlined education: Verbalizes understanding and Demonstrates understanding      PLAN                                                                                                                           Updates to plan of care: extend current plan of care    Frequency / Duration: 2 times per week tapering as patient progresses  for an estimated additional 16 visits for additional 8 weeks for additional 3 months    Suggestions for next session as indicated: Progress per plan of care, modalities prn, STM, A/AA/PROM, scar mobilization, hand strength      GOALS                                                                                                                           Decrease pain/symptoms to 2/10 Progressing  Improve involved strength to 4+/5 all planes of all joints of RUE (awaiting MD approval to address strength)  Improve involved ROM to: Progressing all  R shoulder:  - Flexion 160  - Abduction 160  - IR T5  - ER back of head  R elbow:  - Flexion 125  - Extension 10  R forearm:  - Supination 70  R wrist:  - Flexion/Extension 70/70  Improve right  to 60lbs Progressing   The above improvements in impairments to assist in obtaining  goals listed below  Long Term Goals: to be met by end of plan of care  1. Patient will be independent with instructed task modification. Progressing   2. Patient will  and lift a light grocery bag,  steering wheel, perform light home/yard maintenance tasks with reported manageable/tolerable pain Progressing   3. Patient will reach overhead, behind back and at and above shoulder height with reported manageable/tolerable pain for completion of self-care tasks such as dressing and bathing Progressing   4. Quick DASH: Patient will complete form to reflect an improved calculated score to less than or equal to 30 to indicate patient reported improvement in function/disability/impairment. (minimal clinically important difference = 15.91)      Therapy procedure time and total treatment time can be found documented on the Time Entry flowsheet

## 2024-09-13 NOTE — TELEPHONE ENCOUNTER
Please advise, copy of notes from 9/3/2024 below. Order pended.     PLAN:    Cystoscopy 6 months months  Cytology 6 months  Stone prevention  RBUS 6 months, can see us sooner if would like to have stone treated/managed

## 2024-09-16 NOTE — TELEPHONE ENCOUNTER
Spoke with patient and notified test has been placed for US and advised to follow up w/ dr. DEVRIES patient understood and will call to schedule.

## 2024-09-17 ENCOUNTER — TELEPHONE (OUTPATIENT)
Dept: SURGERY | Facility: CLINIC | Age: 89
End: 2024-09-17

## 2024-09-17 NOTE — TELEPHONE ENCOUNTER
I s/w pt and she states that she wants to have the cysto procedure that AR suggested in 6 months because she is concerned that her Rt low back pain could be this stone that she has. Pt states she took Tylenol last night and it helped but that it didnt last long. I asked what level her pain is on a 0-10 scale and she stated 1-2. She states that the pain is aching and she has it after she sits for a long time and when she gets out of bed in the morning. I explained that it may just be low back strain or arthritic pains as she is 88 y/o. I told her that she can try some more Rylenol and also try eith an ice pack or a heating pad to the affected area for 20 min on and 20 min off. If she develops N/V and fever and chills and blood in her urine and she has pain in the Rt flank area or the Rt. lower abd or groin area then she should seek TX in the ER. Pt verbalized understanding and compliance.         LOV 9/3 for cysto ([facility])  PLAN:    Cystoscopy 6 months months  Cytology 6 months  Stone prevention  RBUS 6 months, can see us sooner if would like to have stone treated/managed

## 2024-09-17 NOTE — TELEPHONE ENCOUNTER
Patient has concerns with her kidney stones and says she is now having back pain. Please call at [phone],thanks.

## 2024-11-04 PROBLEM — C67.9 BLADDER CANCER (HCC): Status: ACTIVE | Noted: 2024-05-01

## 2024-11-05 ENCOUNTER — LAB ENCOUNTER (OUTPATIENT)
Dept: LAB | Age: 89
End: 2024-11-05
Attending: INTERNAL MEDICINE
Payer: MEDICARE

## 2024-11-05 ENCOUNTER — OFFICE VISIT (OUTPATIENT)
Dept: INTERNAL MEDICINE CLINIC | Facility: CLINIC | Age: 89
End: 2024-11-05

## 2024-11-05 VITALS
BODY MASS INDEX: 25.06 KG/M2 | HEART RATE: 73 BPM | HEIGHT: 71 IN | DIASTOLIC BLOOD PRESSURE: 78 MMHG | TEMPERATURE: 97 F | OXYGEN SATURATION: 95 % | RESPIRATION RATE: 16 BRPM | SYSTOLIC BLOOD PRESSURE: 144 MMHG | WEIGHT: 179 LBS

## 2024-11-05 DIAGNOSIS — I95.1 ORTHOSTATIC HYPOTENSION: ICD-10-CM

## 2024-11-05 DIAGNOSIS — I48.0 PAROXYSMAL ATRIAL FIBRILLATION (HCC): ICD-10-CM

## 2024-11-05 DIAGNOSIS — Z00.00 ANNUAL PHYSICAL EXAM: ICD-10-CM

## 2024-11-05 DIAGNOSIS — I50.22 CHRONIC SYSTOLIC HEART FAILURE (HCC): ICD-10-CM

## 2024-11-05 DIAGNOSIS — I70.0 AORTIC ATHEROSCLEROSIS (HCC): ICD-10-CM

## 2024-11-05 DIAGNOSIS — Z00.00 ANNUAL PHYSICAL EXAM: Primary | ICD-10-CM

## 2024-11-05 DIAGNOSIS — N18.30 STAGE 3 CHRONIC KIDNEY DISEASE, UNSPECIFIED WHETHER STAGE 3A OR 3B CKD (HCC): ICD-10-CM

## 2024-11-05 DIAGNOSIS — I34.0 MITRAL VALVE INSUFFICIENCY, UNSPECIFIED ETIOLOGY: ICD-10-CM

## 2024-11-05 DIAGNOSIS — I27.20 PULMONARY HYPERTENSION (HCC): ICD-10-CM

## 2024-11-05 DIAGNOSIS — Z85.51 HISTORY OF BLADDER CANCER: ICD-10-CM

## 2024-11-05 LAB
ALBUMIN SERPL-MCNC: 4.7 G/DL (ref 3.2–4.8)
ALBUMIN/GLOB SERPL: 1.6 {RATIO} (ref 1–2)
ALP LIVER SERPL-CCNC: 135 U/L
ALT SERPL-CCNC: 37 U/L
ANION GAP SERPL CALC-SCNC: 9 MMOL/L (ref 0–18)
AST SERPL-CCNC: 35 U/L (ref ?–34)
BILIRUB SERPL-MCNC: 0.8 MG/DL (ref 0.2–1.1)
BUN BLD-MCNC: 12 MG/DL (ref 9–23)
BUN/CREAT SERPL: 12 (ref 10–20)
CALCIUM BLD-MCNC: 10 MG/DL (ref 8.7–10.4)
CHLORIDE SERPL-SCNC: 106 MMOL/L (ref 98–112)
CHOLEST SERPL-MCNC: 186 MG/DL (ref ?–200)
CO2 SERPL-SCNC: 28 MMOL/L (ref 21–32)
CREAT BLD-MCNC: 1 MG/DL
DEPRECATED RDW RBC AUTO: 46 FL (ref 35.1–46.3)
EGFRCR SERPLBLD CKD-EPI 2021: 55 ML/MIN/1.73M2 (ref 60–?)
ERYTHROCYTE [DISTWIDTH] IN BLOOD BY AUTOMATED COUNT: 13.8 % (ref 11–15)
FASTING PATIENT LIPID ANSWER: YES
FASTING STATUS PATIENT QL REPORTED: YES
GLOBULIN PLAS-MCNC: 2.9 G/DL (ref 2–3.5)
GLUCOSE BLD-MCNC: 112 MG/DL (ref 70–99)
HCT VFR BLD AUTO: 45.7 %
HDLC SERPL-MCNC: 56 MG/DL (ref 40–59)
HGB BLD-MCNC: 15 G/DL
LDLC SERPL CALC-MCNC: 109 MG/DL (ref ?–100)
MCH RBC QN AUTO: 29.8 PG (ref 26–34)
MCHC RBC AUTO-ENTMCNC: 32.8 G/DL (ref 31–37)
MCV RBC AUTO: 90.7 FL
NONHDLC SERPL-MCNC: 130 MG/DL (ref ?–130)
OSMOLALITY SERPL CALC.SUM OF ELEC: 297 MOSM/KG (ref 275–295)
PLATELET # BLD AUTO: 338 10(3)UL (ref 150–450)
POTASSIUM SERPL-SCNC: 3.9 MMOL/L (ref 3.5–5.1)
PROT SERPL-MCNC: 7.6 G/DL (ref 5.7–8.2)
RBC # BLD AUTO: 5.04 X10(6)UL
SODIUM SERPL-SCNC: 143 MMOL/L (ref 136–145)
TRIGL SERPL-MCNC: 118 MG/DL (ref 30–149)
TSI SER-ACNC: 3.9 UIU/ML (ref 0.55–4.78)
VLDLC SERPL CALC-MCNC: 20 MG/DL (ref 0–30)
WBC # BLD AUTO: 7.3 X10(3) UL (ref 4–11)

## 2024-11-05 PROCEDURE — 80061 LIPID PANEL: CPT

## 2024-11-05 PROCEDURE — 85027 COMPLETE CBC AUTOMATED: CPT

## 2024-11-05 PROCEDURE — 36415 COLL VENOUS BLD VENIPUNCTURE: CPT

## 2024-11-05 PROCEDURE — 84443 ASSAY THYROID STIM HORMONE: CPT

## 2024-11-05 PROCEDURE — 80053 COMPREHEN METABOLIC PANEL: CPT

## 2024-11-05 RX ORDER — MIDODRINE HYDROCHLORIDE 5 MG/1
5 TABLET ORAL
Qty: 180 TABLET | Refills: 3 | Status: SHIPPED | OUTPATIENT
Start: 2024-11-05

## 2024-11-05 NOTE — PATIENT INSTRUCTIONS
Await results of today's blood tests  Continue midodrine twice daily  Continue healthy diet and regular walking  Return visit in 6 months

## 2024-11-05 NOTE — PROGRESS NOTES
Subjective:   Rachelle Hunter is a 89 year old female who presents this morning for a Medicare Subsequent Annual Wellness visit (Pt already had Initial Annual Wellness) and scheduled follow up of multiple significant but stable problems including prior paroxysmal atrial fibrillation status post AV ragini ablation and pacemaker placement, systolic CHF, orthostatic hypotension and chronic kidney disease.    Her last Medicare physical was November 2023.  She feels well.  No particular issues for discussion.  She had acute COVID infection about 2 weeks ago but has recovered.    In terms of her prior paroxysmal atrial fibrillation and systolic CHF, she follows regularly with Cardiology.  She also was recently diagnosed with bladder cancer and now follows with Urology.  Diet healthy and she walks regularly.  Weight up 7 pounds since physical November 2023.  She is deferring further colonoscopies, mammograms and Pap smears.    History/Other:   Fall Risk Assessment:   She has been screened for Falls and is low risk.      Cognitive Assessment:   She had a completely normal cognitive assessment - see flowsheet entries     Functional Ability/Status:   Rachelle Hunter has some abnormal functions as listed below:  She has Vision problems based on screening of functional status.       Depression Screening (PHQ):  PHQ-2 SCORE: 0  , done 11/5/2024   If you checked off any problems, how difficult have these problems made it for you to do your work, take care of things at home, or get along with other people?: Not difficult at all    Last Wisdom Suicide Screening on 11/5/2024 was No Risk.     Advanced Directives:   She does have a Living Will but we do NOT have it on file in Epic.    She does have a POA but we do NOT have it on file in Epic.    Not discussed      Patient Active Problem List   Diagnosis    Paroxysmal atrial fibrillation (HCC)    Osteopenia    Kidney stones    Primary open angle glaucoma of both eyes, moderate stage     Vitreous floaters of both eyes    Actinic keratosis    Pseudophakia of both eyes    Atrial fibrillation with rapid ventricular response (HCC)    Other chest pain    Aortic atherosclerosis (HCC)    LV dysfunction    Mitral regurgitation    Pulmonary hypertension (HCC)    Chronic systolic heart failure (HCC)    Left bundle branch block    History of cardioversion    After-cataract obscuring vision, bilateral    Chronic kidney disease, stage 3 (HCC)    Neoplasm of uncertain behavior of skin    Sun-damaged skin    Left leg DVT (HCC)    Epidermal cyst    Orthostatic hypotension    Atrial fibrillation with normal ventricular rate (HCC)    Atrial fibrillation (HCC)    Cholelithiasis    Bladder cancer (HCC)     Allergies:  She is allergic to sulfa antibiotics.    Current Medications:  Outpatient Medications Marked as Taking for the 11/5/24 encounter (Office Visit) with Stoney Darnell MD   Medication Sig    midodrine 5 MG Oral Tab Take 1 tablet (5 mg total) by mouth 2 (two) times daily before meals.    docusate sodium 100 MG Oral Cap Take 1 capsule (100 mg total) by mouth 2 (two) times daily.    senna-docusate 8.6-50 MG Oral Tab Take 1 tablet by mouth daily.    polyethylene glycol, PEG 3350, 17 g Oral Powd Pack Take 17 g by mouth daily as needed.    clobetasol 0.05 % External Ointment Apply 1 Application topically twice a week. Twice weekly for maintenance, if experiencing flare: increase usage to 2x/daily for 2 weeks followed by daily for 2 weeks, then back to maintenance 2x/week    estradiol (ESTRACE) 0.1 MG/GM Vaginal Cream Apply 1/2 gram vaginally 2-3 times per week.    latanoprost 0.005 % Ophthalmic Solution INSTILL 1 DROP IN BOTH EYES AT BEDTIME    XARELTO 20 MG Oral Tab Take 1 tablet (20 mg total) by mouth daily with food.     Current Facility-Administered Medications for the 11/5/24 encounter (Office Visit) with Stoney Darnell MD   Medication    ciprofloxacin (Cipro) tab 500 mg    lidocaine (Urojet) 2 % urethral  jelly 10 mL       Medical History:  She  has a past medical history of Acute systolic CHF (congestive heart failure) ([facility]) (12/2017), Aortic atherosclerosis (HCC), BCC (basal cell carcinoma) (2021), Bladder cancer (HCC) (05/2024), Cholelithiasis, Chronic kidney disease, stage 3 (HCC), Glaucoma (2012), Herpes zoster (09/2010), Kidney stones (06/2001), Left bundle branch block, Left leg DVT ([facility]) (10/2021), LV dysfunction, Mitral regurgitation, Orthostatic hypotension, Osteopenia (09/2011), Paroxysmal atrial fibrillation (HCC) (03/2000), and Pulmonary hypertension ([facility]).  Surgical History:  She  has a past surgical history that includes tubal ligation (1976); appendectomy (07/2000); lithotripsy (Left, 06/2001); other (07/2011); Cataract extraction w/  intraocular lens implant (Right, 04/13/2017); Cataract extraction w/  intraocular lens implant (Left, 06/2017); cardioversion (01/2018); cardioversion (02/19/2024); cath pacemaker implant dual (03/04/2024); ep av node ablation (03/04/2024); and other surgical history (05/16/2024).   Family History:  Her family history includes Crohn's Disease in her brother; Diabetes in her father; Glaucoma in her mother and paternal aunt; Macular degeneration in her paternal aunt; Pancreatic Cancer in her father; Renal Cell Carcinoma in her brother.  Social History:  She  reports that she has never smoked. She has never used smokeless tobacco. She reports that she does not currently use alcohol after a past usage of about 0.8 standard drinks of alcohol per week. She reports that she does not use drugs.    Tobacco:  She has never smoked tobacco.    CAGE Alcohol Screen:   CAGE screening score of 0 on 11/2/2024, showing low risk of alcohol abuse.      Patient Care Team:  Stoney Darnell MD as PCP - General (Internal Medicine)    Review of Systems    REVIEW OF SYSTEMS:   GENERAL: No fever  LUNGS: Occasional cough.  No wheezing or shortness of breath  CARDIAC: No lightheadedness palpitations  or chest pain  GI: Occasional nausea without obvious precipitant.  No anorexia heartburn dysphagia vomiting abdominal pain diarrhea constipation or rectal bleeding  : No urinary frequency dysuria or hematuria  MUSCULOSKELETAL: No leg swelling  NEURO: No headaches     Objective:   Physical Exam    EXAM:   GENERAL: Pleasant female appearing well in no distress  /78 (BP Location: Right arm, Patient Position: Sitting, Cuff Size: adult)   Pulse 73   Temp 97.2 °F (36.2 °C) (Temporal)   Resp 16   Ht 5' 11\" (1.803 m)   Wt 179 lb (81.2 kg)   SpO2 95%   BMI 24.97 kg/m² Upon recheck by me, /86 right arm supine and 124/74 right arm standing without headedness  HEENT: Anicteric, conjunctiva pink, oropharynx normal  NECK: Supple without mass or thyromegaly  NODES: No peripheral adenopathy  LUNGS: Resonant to percussion and clear to auscultation  CARDIAC: Rhythm regular S1 S2 normal without murmur or edema  ABDOMEN: Bowel sounds normal soft nontender without mass or hepatosplenomegaly  EXTREMITIES: Normal without cyanosis or clubbing  PULSES: 1-2+ bilateral dorsalis pedis and posterior tibial  NEURO: Reflexes 2+ bilaterally and symmetric     /78 (BP Location: Right arm, Patient Position: Sitting, Cuff Size: adult)   Pulse 73   Temp 97.2 °F (36.2 °C) (Temporal)   Resp 16   Ht 5' 11\" (1.803 m)   Wt 179 lb (81.2 kg)   SpO2 95%   BMI 24.97 kg/m²  Estimated body mass index is 24.97 kg/m² as calculated from the following:    Height as of this encounter: 5' 11\" (1.803 m).    Weight as of this encounter: 179 lb (81.2 kg).    Medicare Hearing Assessment:   Hearing Screening    Screening Method: Finger Rub  Finger Rub Result: Pass               Assessment & Plan:   Rachelle Hunter is a 89 year old female who presents for a Medicare Assessment.     1. Annual physical exam (Primary)  Check CMP CBC lipid profile and TSH with reflex T4.  Order sent  Continue current medication.  Prescription refill for  midodrine sent to pharmacy  Reinforced healthy diet and regular walking, maintaining current body weight  Annual Medicare physical  Return visit in 6 months for recheck  -     Comp Metabolic Panel (14); Future; Expected date: 11/05/2024  -     CBC, Platelet; No Differential; Future; Expected date: 11/05/2024  -     Lipid Panel; Future; Expected date: 11/05/2024  -     TSH W Reflex To Free T4; Future; Expected date: 11/05/2024    2. Paroxysmal atrial fibrillation (HCC)  No recurrence after AV ragini ablation and pacemaker placement  Ongoing follow-up with Cardiology    3. Chronic systolic heart failure (HCC)  Well compensated without symptoms or signs of CHF  Overview:  EF 35-40%  Orders:  -     Comp Metabolic Panel (14); Future; Expected date: 11/05/2024  -     CBC, Platelet; No Differential; Future; Expected date: 11/05/2024  -     Lipid Panel; Future; Expected date: 11/05/2024  -     TSH W Reflex To Free T4; Future; Expected date: 11/05/2024    4. Orthostatic hypotension  Adequately controlled without postural symptoms  Continue midodrine  -     Midodrine HCl; Take 1 tablet (5 mg total) by mouth 2 (two) times daily before meals.  Dispense: 180 tablet; Refill: 3    5. Mitral valve insufficiency, unspecified etiology  Ongoing follow-up with Cardiology  Overview:  Echo 1-18 with moderate-severe MR and TR    6. Stage 3 chronic kidney disease, unspecified whether stage 3a or 3b CKD (HCC)  Recently stable.  Await labs    7. History of bladder cancer  Recent diagnosis  Ongoing follow-up with Urology    8. Pulmonary hypertension (HCC)  Asymptomatic.  Continue to monitor    9. Aortic atherosclerosis (HCC)  Asymptomatic.  Risk factor control  Overview:  CXR 12-17      The patient indicates understanding of these issues and agrees to the plan.  Reinforced healthy diet, lifestyle, and exercise.      No follow-ups on file.     Stoney Darnell MD, 11/5/2024     Supplementary Documentation:   General Health:  In the past six  months, have you lost more than 10 pounds without trying?: (Patient-Rptd) 2 - No  Has your appetite been poor?: (Patient-Rptd) No  Type of Diet: (Patient-Rptd) Balanced  How does the patient maintain a good energy level?: (Patient-Rptd) Daily Walks  How would you describe your daily physical activity?: (Patient-Rptd) Moderate  How do you maintain positive mental well-being?: (Patient-Rptd) Social Interaction;Games;Visiting Friends;Visiting Family  On a scale of 0 to 10, with 0 being no pain and 10 being severe pain, what is your pain level?: (Patient-Rptd) 0 - (None)  In the past six months, have you experienced urine leakage?: (Patient-Rptd) 1-Yes  At any time do you feel concerned for the safety/well-being of yourself and/or your children, in your home or elsewhere?: No  Have you had any immunizations at another office such as Influenza, Hepatitis B, Tetanus, or Pneumococcal?: (Patient-Rptd) Yes    Health Maintenance   Topic Date Due    Annual Physical  11/14/2024    HTN: BP Follow-Up  12/05/2024    Influenza Vaccine  Completed    Annual Depression Screening  Completed    Fall Risk Screening (Annual)  Completed    Pneumococcal Vaccine: 65+ Years  Completed    Zoster Vaccines  Completed    COVID-19 Vaccine  Completed

## 2024-12-03 ENCOUNTER — OFFICE VISIT (OUTPATIENT)
Dept: UROLOGY | Facility: HOSPITAL | Age: 89
End: 2024-12-03
Attending: PHYSICIAN ASSISTANT
Payer: MEDICARE

## 2024-12-03 VITALS — HEIGHT: 71 IN | BODY MASS INDEX: 25.06 KG/M2 | WEIGHT: 179 LBS | RESPIRATION RATE: 18 BRPM

## 2024-12-03 DIAGNOSIS — N90.4 VULVAR DYSTROPHY: Primary | ICD-10-CM

## 2024-12-03 DIAGNOSIS — N81.84 PELVIC MUSCLE WASTING: ICD-10-CM

## 2024-12-03 DIAGNOSIS — N95.2 POSTMENOPAUSAL ATROPHIC VAGINITIS: ICD-10-CM

## 2024-12-03 PROCEDURE — 99212 OFFICE O/P EST SF 10 MIN: CPT

## 2024-12-03 NOTE — PROGRESS NOTES
Patient presents to follow up UGA and vulvar dystrophy    Using vag estrogen 2x weekly  Using clobetasol 2x weekly  No vulvar sx    Bowels improving with miralax    Some UUI, JAG -- not bothersome    Urogynecology Summary:  Urogynecology Summary  Prolapse: No  JAG: Yes  Urge Incontinence: Yes  Nocturia Frequency: 2  Frequency: 2 - 3 hours  Incomplete emptying: No  Constipation: Yes (Reports improving following bowel regimen. Implemented Miralax twice daily.)  Wears pad day?: 1  Wears Pad Night?: 0  Activities are limited by UI/POP?: No  Currently Sexually Active: No  Avoids sexual activity due to: Other    Vitals:  Resp 18   Ht 71\"   Wt 179 lb (81.2 kg)   BMI 24.97 kg/m²     GENERAL EXAM:  GENERAL: alert & oriented, NAD  HEENT: NC/AT, sclera without injection  SKIN: no rashes  LUNGS:  without increased respiratory effort  ABDOMEN: soft, non-tender, non-distended, no masses appreciated  EXT: no edema    PELVIC EXAM: DORENE De La Rosa, present for exam as a chaperone  Ext. 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 Pelvic muscle wasting  N81.84           Discussion Items:   Discussed mgmt of vulvovaginal atrophy with vaginal estrogen cream. Reviewed associated benefits, risks, alternatives, and goals. Recommend low dose 2x/week treatment.    Discussed management options for vulvar dystrophy.  Discussed  chronic nature of symptoms.  Discussed steroid treatments and vulvar care, discussed associated treatment risks & benefits.  Discussed need for future vulvar biopsy if sx persist despite mgmt as prescribed.  Discussed flare dosing instructions.      Treatment Plan:  Continue clobetasol ointment 2x weekly, flare dosing PRN  Continue vag estrogen cream as prescribed  Bowel regimen  Bladder diet/drill  Pelvic floor exercises  Call with s/sx of UTI    All questions answered  She understands and agrees to plan    Return in about 1 year (around 12/3/2025) for UA, vulvar dystrophy.    Jacy Gonzales PA-C    Note to patient: The 21st Century Cures Act makes medical notes like these available to patients in the interest of transparency.  However, be advised this is a medical document.  It is intended as peer to peer communication.  It is written in medical language and may contain abbreviations or verbiage that are unfamiliar.  It may appear blunt or direct.  Medical documents are intended to carry relevant information, facts as evident, and the clinical opinion of the practitioner.

## 2025-02-25 ENCOUNTER — HOSPITAL ENCOUNTER (OUTPATIENT)
Dept: ULTRASOUND IMAGING | Facility: HOSPITAL | Age: 89
Discharge: HOME | End: 2025-02-25
Attending: UROLOGY
Payer: MEDICARE

## 2025-02-25 DIAGNOSIS — N20.0 NEPHROLITHIASIS: ICD-10-CM

## 2025-02-25 PROCEDURE — 76775 US EXAM ABDO BACK WALL LIM: CPT | Performed by: UROLOGY

## 2025-03-04 ENCOUNTER — PROCEDURE (OUTPATIENT)
Dept: SURGERY | Facility: CLINIC | Age: 89
End: 2025-03-04
Payer: MEDICARE

## 2025-03-04 VITALS — SYSTOLIC BLOOD PRESSURE: 116 MMHG | DIASTOLIC BLOOD PRESSURE: 64 MMHG

## 2025-03-04 DIAGNOSIS — N20.0 NEPHROLITHIASIS: ICD-10-CM

## 2025-03-04 DIAGNOSIS — C67.9 MALIGNANT NEOPLASM OF URINARY BLADDER, UNSPECIFIED SITE (HCC): Primary | ICD-10-CM

## 2025-03-04 DIAGNOSIS — D49.4 BLADDER TUMOR: ICD-10-CM

## 2025-03-04 PROCEDURE — 52000 CYSTOURETHROSCOPY: CPT | Performed by: UROLOGY

## 2025-03-04 NOTE — PROCEDURES
CYSTOSCOPY (FEMALE)     PRE-OP DIAGNOSIS: LgTa     POST-OP DIAGNOSIS: same     PROCEDURE: Cystsocopy     SURGEON: Lina Lundberg MD     ASSISTANT: none      EBL: minimal     FINDINGS:   Urethra: No urethral lesions, no urethral strictures  Bladder: Bilateral ureteral orifices in orthotopic position with efflux, no suspicious or concerning erythematous lesions, no papillary bladder tumors, no stones   Retroflexion: no abnormalities   Other findings: none     INDICATIONS: LgTa TURBT on 2024.     PROCEDURE: Patient was brought to the procedure suite and a timeout was performed identifying the patient,  and procedure being performed.  The risks of the procedure were once again detailed to the patient including bleeding, infection, dysuria.  The patient agreed to proceed.  The patient had a negative urinalysis.  No antibiotics were given to patient prior to this procedure.      She was placed in a supine position on the table and a flexible cystoscope was inserted per urethra.  There were no obvious urethral lesions or strictures. Once in the bladder we performed a full diagnostic/surveillance cystoscopy which demonstrated no abnormalities.  On retroflexion we noted no abnormalities.  The scope was then carefully removed and once again no urethral abnormalities were noted.    There were no complications after this procedure and the patient tolerated the procedure without issue.    IMPRESSION: LgTa with cystoscopy negative! Will plan for repeat cystoscopy and cytology in 6 months.      For a low-risk patient whose first surveillance cystoscopy is negative for tumor, a clinician should perform subsequent surveillance cystoscopy six to nine months later, and then annually thereafter; surveillance after five years in the absence of recurrence should be based on shared-decision making between the patient and clinician.     RBUS with nonobstructing stones. Patient with known nonobstructing stone. She knows to  monitor for flank pain fevers, chills and to go to the ER if these develop.     PLAN:    Cystoscopy 6 months months  Cytology 6 months  Stone prevention  Ct in 3-6 months if interested in stone managemnet - she opted for RBUS in 6 months

## 2025-03-05 LAB — NON GYNE INTERPRETATION: NEGATIVE

## 2025-04-26 ENCOUNTER — APPOINTMENT (OUTPATIENT)
Dept: CT IMAGING | Facility: HOSPITAL | Age: 89
End: 2025-04-26
Attending: EMERGENCY MEDICINE
Payer: MEDICARE

## 2025-04-26 ENCOUNTER — HOSPITAL ENCOUNTER (EMERGENCY)
Facility: HOSPITAL | Age: 89
Discharge: HOME | End: 2025-04-26
Attending: EMERGENCY MEDICINE
Payer: MEDICARE

## 2025-04-26 ENCOUNTER — APPOINTMENT (OUTPATIENT)
Dept: GENERAL RADIOLOGY | Facility: HOSPITAL | Age: 89
End: 2025-04-26
Attending: EMERGENCY MEDICINE
Payer: MEDICARE

## 2025-04-26 VITALS
RESPIRATION RATE: 20 BRPM | SYSTOLIC BLOOD PRESSURE: 179 MMHG | HEIGHT: 71 IN | DIASTOLIC BLOOD PRESSURE: 92 MMHG | WEIGHT: 173 LBS | BODY MASS INDEX: 24.22 KG/M2 | OXYGEN SATURATION: 98 % | TEMPERATURE: 95 F | HEART RATE: 70 BPM

## 2025-04-26 DIAGNOSIS — S00.83XA TRAUMATIC HEMATOMA OF FOREHEAD, INITIAL ENCOUNTER: ICD-10-CM

## 2025-04-26 DIAGNOSIS — R58 ECCHYMOSIS: ICD-10-CM

## 2025-04-26 DIAGNOSIS — S02.2XXA CLOSED FRACTURE OF NASAL BONE, INITIAL ENCOUNTER: ICD-10-CM

## 2025-04-26 DIAGNOSIS — R55 SYNCOPE, VASOVAGAL: Primary | ICD-10-CM

## 2025-04-26 DIAGNOSIS — J01.90 ACUTE SINUSITIS, RECURRENCE NOT SPECIFIED, UNSPECIFIED LOCATION: ICD-10-CM

## 2025-04-26 LAB
ANION GAP SERPL CALC-SCNC: 9 MMOL/L (ref 0–18)
APTT PPP: 45.3 SECONDS (ref 23–36)
BASOPHILS # BLD AUTO: 0.04 X10(3) UL (ref 0–0.2)
BASOPHILS NFR BLD AUTO: 0.7 %
BILIRUB UR QL: NEGATIVE
BUN BLD-MCNC: 10 MG/DL (ref 9–23)
BUN/CREAT SERPL: 10.8 (ref 10–20)
CALCIUM BLD-MCNC: 8.9 MG/DL (ref 8.7–10.4)
CHLORIDE SERPL-SCNC: 105 MMOL/L (ref 98–112)
CO2 SERPL-SCNC: 28 MMOL/L (ref 21–32)
CREAT BLD-MCNC: 0.93 MG/DL (ref 0.55–1.02)
DEPRECATED RDW RBC AUTO: 44.4 FL (ref 35.1–46.3)
EGFRCR SERPLBLD CKD-EPI 2021: 59 ML/MIN/1.73M2 (ref 60–?)
EOSINOPHIL # BLD AUTO: 0.11 X10(3) UL (ref 0–0.7)
EOSINOPHIL NFR BLD AUTO: 1.8 %
ERYTHROCYTE [DISTWIDTH] IN BLOOD BY AUTOMATED COUNT: 13.2 % (ref 11–15)
GLUCOSE BLD-MCNC: 107 MG/DL (ref 70–99)
GLUCOSE UR-MCNC: NORMAL MG/DL
HCT VFR BLD AUTO: 39.1 % (ref 35–48)
HGB BLD-MCNC: 12.7 G/DL (ref 12–16)
HYALINE CASTS #/AREA URNS AUTO: PRESENT /LPF
IMM GRANULOCYTES # BLD AUTO: 0.02 X10(3) UL (ref 0–1)
IMM GRANULOCYTES NFR BLD: 0.3 %
INR BLD: 2.18 (ref 0.8–1.2)
KETONES UR-MCNC: NEGATIVE MG/DL
LEUKOCYTE ESTERASE UR QL STRIP.AUTO: 250
LYMPHOCYTES # BLD AUTO: 1.06 X10(3) UL (ref 1–4)
LYMPHOCYTES NFR BLD AUTO: 17.7 %
MCH RBC QN AUTO: 29.4 PG (ref 26–34)
MCHC RBC AUTO-ENTMCNC: 32.5 G/DL (ref 31–37)
MCV RBC AUTO: 90.5 FL (ref 80–100)
MONOCYTES # BLD AUTO: 0.49 X10(3) UL (ref 0.1–1)
MONOCYTES NFR BLD AUTO: 8.2 %
NEUTROPHILS # BLD AUTO: 4.26 X10 (3) UL (ref 1.5–7.7)
NEUTROPHILS # BLD AUTO: 4.26 X10(3) UL (ref 1.5–7.7)
NEUTROPHILS NFR BLD AUTO: 71.3 %
NITRITE UR QL STRIP.AUTO: NEGATIVE
OSMOLALITY SERPL CALC.SUM OF ELEC: 294 MOSM/KG (ref 275–295)
PH UR: 6.5 [PH] (ref 5–8)
PLATELET # BLD AUTO: 191 10(3)UL (ref 150–450)
POTASSIUM SERPL-SCNC: 3.4 MMOL/L (ref 3.5–5.1)
PROT UR-MCNC: NEGATIVE MG/DL
PROTHROMBIN TIME: 25.3 SECONDS (ref 11.6–14.8)
RBC # BLD AUTO: 4.32 X10(6)UL (ref 3.8–5.3)
SODIUM SERPL-SCNC: 142 MMOL/L (ref 136–145)
SP GR UR STRIP: 1.01 (ref 1–1.03)
TROPONIN I SERPL HS-MCNC: 11 NG/L (ref ?–34)
UROBILINOGEN UR STRIP-ACNC: NORMAL
WBC # BLD AUTO: 6 X10(3) UL (ref 4–11)

## 2025-04-26 PROCEDURE — 85730 THROMBOPLASTIN TIME PARTIAL: CPT | Performed by: EMERGENCY MEDICINE

## 2025-04-26 PROCEDURE — 80048 BASIC METABOLIC PNL TOTAL CA: CPT | Performed by: EMERGENCY MEDICINE

## 2025-04-26 PROCEDURE — 85025 COMPLETE CBC W/AUTO DIFF WBC: CPT | Performed by: EMERGENCY MEDICINE

## 2025-04-26 PROCEDURE — 93010 ELECTROCARDIOGRAM REPORT: CPT

## 2025-04-26 PROCEDURE — 36415 COLL VENOUS BLD VENIPUNCTURE: CPT

## 2025-04-26 PROCEDURE — 85610 PROTHROMBIN TIME: CPT | Performed by: EMERGENCY MEDICINE

## 2025-04-26 PROCEDURE — 99285 EMERGENCY DEPT VISIT HI MDM: CPT

## 2025-04-26 PROCEDURE — 99284 EMERGENCY DEPT VISIT MOD MDM: CPT

## 2025-04-26 PROCEDURE — 72125 CT NECK SPINE W/O DYE: CPT | Performed by: EMERGENCY MEDICINE

## 2025-04-26 PROCEDURE — 81001 URINALYSIS AUTO W/SCOPE: CPT | Performed by: EMERGENCY MEDICINE

## 2025-04-26 PROCEDURE — 87086 URINE CULTURE/COLONY COUNT: CPT | Performed by: EMERGENCY MEDICINE

## 2025-04-26 PROCEDURE — 70450 CT HEAD/BRAIN W/O DYE: CPT | Performed by: EMERGENCY MEDICINE

## 2025-04-26 PROCEDURE — 71045 X-RAY EXAM CHEST 1 VIEW: CPT | Performed by: EMERGENCY MEDICINE

## 2025-04-26 PROCEDURE — 93005 ELECTROCARDIOGRAM TRACING: CPT

## 2025-04-26 PROCEDURE — 70486 CT MAXILLOFACIAL W/O DYE: CPT | Performed by: EMERGENCY MEDICINE

## 2025-04-26 PROCEDURE — 84484 ASSAY OF TROPONIN QUANT: CPT | Performed by: EMERGENCY MEDICINE

## 2025-04-26 RX ORDER — ACETAMINOPHEN 325 MG/1
650 TABLET ORAL EVERY 6 HOURS PRN
Qty: 20 TABLET | Refills: 0 | Status: SHIPPED | OUTPATIENT
Start: 2025-04-26

## 2025-04-26 NOTE — ED QUICK NOTES
Patient discharged with instructions for follow up at home, home medications reviewed, and diagnosis explained.  All questions answered and addressed. Wheelchair offered.

## 2025-04-26 NOTE — DISCHARGE INSTRUCTIONS
Take all medications as prescribed.  Follow-up with your primary care provider in 1 to 2 days.  Given ENT referral for the nasal bone fracture call to schedule appoint.  Return to the ER if some continue, get worse, unable to follow-up

## 2025-04-26 NOTE — ED INITIAL ASSESSMENT (HPI)
Pt reports syncopal episode while sitting on the toilet on Wednesday. Pt reports fell from seating position to the hard tile floor. +xarelto use. Pt presents requesting CT scan. Blue, purple, and yellow bruising noted to entire face. +large hematoma to forehead. Unable to obtain oral temp in triage, temporal temp 95.3.

## 2025-04-26 NOTE — ED PROVIDER NOTES
Patient Seen in: [facility] Emergency Department    History     Chief Complaint   Patient presents with    Trauma       HPI    89-year-old female history of hypertension, CHF, chronic kidney disease, A-fib on Xarelto who presents ER today after she had a syncopal episode 3 days ago.  Patient states she was using the bathroom right after she finished she stood up and passed out.  Patient landed on her face.  Has bruising to her face and forehead.  Also swelling to the forehead.  Since then no syncopal episodes other than the initial 1.  No chest pain or shortness of breath.  No trouble moving arms or legs.  Patient concerned because of all the bruising here to be evaluated and get CT scans.    History reviewed. Past Medical History[1]    History reviewed. Past Surgical History[2]      Medications :  Prescriptions Prior to Admission[3]     Family History[4]    Smoking Status: Social Hx on file[5]    Constitutional and vital signs reviewed.      Social History and Family History elements reviewed from today, pertinent positives to the presenting problem noted.    Physical Exam     ED Triage Vitals [04/26/25 1122]   BP (!) 171/101   Pulse 70   Resp 20   Temp (!) 95.3 °F (35.2 °C)   Temp src Temporal   SpO2 97 %   O2 Device None (Room air)       All measures to prevent infection transmission during my interaction with the patient were taken. Handwashing was performed prior to and after the exam.  Stethoscope and any equipment used during my examination was cleaned with super sani-cloth germicidal wipes following the exam.     Physical Exam  Vitals and nursing note reviewed.   HENT:      Head: Normocephalic.      Comments: Large hematoma to the forehead.  Eyes:      Extraocular Movements: Extraocular movements intact.      Conjunctiva/sclera: Conjunctivae normal.      Pupils: Pupils are equal, round, and reactive to light.   Cardiovascular:      Rate and Rhythm: Normal rate.      Pulses: Normal pulses.    Pulmonary:      Effort: Pulmonary effort is normal.   Musculoskeletal:      Cervical back: Normal range of motion and neck supple.   Skin:     General: Skin is warm and dry.      Comments: Ecchymosis to forehead, bilateral cheeks face and neck.   Neurological:      General: No focal deficit present.      Mental Status: She is alert and oriented to person, place, and time.         ED Course        Labs Reviewed   BASIC METABOLIC PANEL (8) - Abnormal; Notable for the following components:       Result Value    Glucose 107 (*)     Potassium 3.4 (*)     eGFR-Cr 59 (*)     All other components within normal limits   PROTHROMBIN TIME (PT) - Abnormal; Notable for the following components:    PT 25.3 (*)     INR 2.18 (*)     All other components within normal limits   PTT, ACTIVATED - Abnormal; Notable for the following components:    PTT 45.3 (*)     All other components within normal limits   TROPONIN I HIGH SENSITIVITY - Normal   CBC WITH DIFFERENTIAL WITH PLATELET   URINALYSIS WITH CULTURE REFLEX   RAINBOW DRAW LAVENDER   RAINBOW DRAW LIGHT GREEN   RAINBOW DRAW BLUE   RAINBOW DRAW GOLD     EKG    Rate, intervals and axes as noted on EKG Report.  Rate: 70  Rhythm:paced  Rhythm  Reading: Paced rhythm, heart rate 70, no SC interval, normal axis           As Interpreted by me    Imaging Results Available and Reviewed while in ED: CT SPINE CERVICAL (CPT=72125)  Result Date: 4/26/2025  CONCLUSION:  1.  Subcutaneous fat stranding across the ventral neck extending into the supraclavicular region, which could represent soft tissue injury, cellulitis, or other inflammatory process.  Inferior to the left lobe of the thyroid there is an ill-defined region of fluid which could represent small hematoma or post traumatic inflammation.  Recommend follow-up CT neck with IV contrast. 2.  No acute osseous abnormality of the cervical spine.  Mild degenerative change within the cervical spine.     Dictated by (CST): Ricardo Briceno MD on  4/26/2025 at 1:20 PM     Finalized by (CST): Ricardo Briceno MD on 4/26/2025 at 1:24 PM          CT FACIAL BONES (CPT=70486)  Result Date: 4/26/2025  CONCLUSION:  1.  3 x 6 cm hematoma in the bifrontal/forehead scalp with inflammatory changes extending from this hematoma across both perioral regions of the face.  2.  Acute mildly comminuted nasal bone fracture.   Dictated by (CST): Ricardo Briceno MD on 4/26/2025 at 1:16 PM     Finalized by (CST): Ricardo Briceno MD on 4/26/2025 at 1:19 PM          CT BRAIN OR HEAD (CPT=70450)  Result Date: 4/26/2025  CONCLUSION:  1.  2 x 6 cm hematoma in the bifrontal/forehead scalp.  There is associated soft tissue inflammation extending into the bilateral periorbital region of the face compatible with soft tissue injury. 2.  Acute nasal bone fracture. 3.  No acute intracranial process. 4.  Inflammatory changes in the right maxillary sinus with gas.  No associated orbital floor fracture.  Correlate for sinusitis.  Dictated by (CST): Ricardo Briceno MD on 4/26/2025 at 1:03 PM     Finalized by (CST): Ricardo Briceno MD on 4/26/2025 at 1:06 PM          XR CHEST AP PORTABLE  (CPT=71045)  Result Date: 4/26/2025  CONCLUSION: Small left pleural effusion.  Mild interstitial edema.  Left basal pulmonary opacity which could represent edema, atelectasis, or pneumonia.  Post cardiac pacing device.   Dictated by (CST): Ricardo Briceno MD on 4/26/2025 at 12:48 PM     Finalized by (CST): Ricardo Briceno MD on 4/26/2025 at 12:50 PM          ED Medications Administered: Medications - No data to display      MDM     Vitals:    04/26/25 1222 04/26/25 1237 04/26/25 1300 04/26/25 1330   BP:    (!) 179/92   Pulse: 70 70 71 70   Resp: 13 13 17 20   Temp:       TempSrc:       SpO2: 96% 97% 95% 98%   Weight:       Height:         *I personally reviewed and interpreted all ED vitals.    Pulse Ox: 97%, Room air, Normal     Monitor Interpretation:    paced Rhythm  as interpreted by me.  The cardiac monitor was ordered to monitor  cardiac rate and rhythm.    Differential Diagnosis/ Diagnostic Considerations: Vasovagal episode, pacemaker malfunction, ICH, fracture, hematoma    Complicating Factors: The patient already has does not have any pertinent problems on file. to contribute to the complexity of this ED evaluation.    Medical Decision Making  Amount and/or Complexity of Data Reviewed  External Data Reviewed: notes.     Details: I reviewed notes from patient's primary care provider to help contribute patient's medical history along with previous labs to compare today to look for any new and acute changes  Labs: ordered. Decision-making details documented in ED Course.  Radiology: ordered and independent interpretation performed. Decision-making details documented in ED Course.  ECG/medicine tests: ordered and independent interpretation performed. Decision-making details documented in ED Course.    Risk  OTC drugs.  Prescription drug management.    I reviewed all results with the patient and family members at the bedside.  EKG showed no acute changes labs and there was nothing acute.  Patient's INR is at 2 however she is anticoagulated and within therapeutic range.  CT images of the brain myself there is no signs of ICH.  Reviewed CT results from the radiologist patient has a nasal bone fracture with a bifrontal forehead hematoma otherwise nothing else acute.  No intracranial bleeding.  No cervical fractures.  No other facial fractures.  Incidentally noted also sinus infection.  Patient's chest x-ray showed small left pleural effusion with basilar opacity which is most likely atelectasis.  Patient has no signs of shortness of breath.  No fluid overload.  I explained to the patient she can be discharged home.  Syncopal episode was most likely a vasovagal attack right after using the bathroom.  There is no arrhythmia on her EKG ecchymosis will take a while to resolve on its own but is already in multiple different colors of healing.  Patient  will get prescribed Augmentin for sinus infection.  Tylenol as needed for pain.  Patient should follow-up with her primary care provider in 1 to 2 days.  I also gave her ENT referral due to the nasal bone fracture call to schedule appointment.  Return to the ER if some continue, get worse, unable to follow-up    Condition upon leaving the department: Stable    Disposition and Plan     Clinical Impression:  1. Syncope, vasovagal    2. Closed fracture of nasal bone, initial encounter    3. Acute sinusitis, recurrence not specified, unspecified location    4. Traumatic hematoma of forehead, initial encounter    5. Ecchymosis        Disposition:  Discharge    Follow-up:  Mann Ball MD  [address] [place] [id number]  [phone]    Follow up        Medications Prescribed:  Current Discharge Medication List        START taking these medications    Details   amoxicillin clavulanate 875-125 MG Oral Tab Take 1 tablet by mouth 2 (two) times daily for 10 days.  Qty: 20 tablet, Refills: 0      acetaminophen 325 MG Oral Tab Take 2 tablets (650 mg total) by mouth every 6 (six) hours as needed for Pain.  Qty: 20 tablet, Refills: 0                              [1]   Past Medical History:   Acute systolic CHF (congestive heart failure) (HCC)    Cardiac cath 1-18 with no obstructive CAD; Echo 1-18 with EF 35-40% ;Lexiscan GXT 6-22 with mild LVE, normal perfusion, EF 62%; Echo 2-24 with mild LVH, EF 30-35%, mild MINOR, mild MR    Aortic atherosclerosis    CXR 12-17    Arrhythmia    BCC (basal cell carcinoma)    right tip of nose    Bladder cancer (HCC)    Low-grade papillary urothelial carcinoma, non-invasive s/p TURBT    Cholelithiasis    CT scan 4-24    Chronic kidney disease, stage 3 (HCC)    Congestive heart disease (HCC)    Essential hypertension    Glaucoma    2012- START Latanoprost qhs OS, based on OCT    Herpes zoster    Right leg    Kidney stones    Treated with lithotripsy    Left bundle branch block    Left  leg DVT (HCC)    Partially occlusive thrombus left proximal femoral vein    LV dysfunction    Echo 1-18 with EF 35-40%; cardiac cath 1-18 with EF 40% and no obstructive CAD; Echo 7-20 with EF 40-45%, mild MR, mild TR    Mitral regurgitation    Echo 1-18 with moderate-severe MR and TR; JAI 1-18 with moderate-severe MR    Orthostatic hypotension    Osteopenia    T score -1.3 hip and -0.7 lumbar spine    Paroxysmal atrial fibrillation (HCC)    Nuclear treadmill stress test 7-15 normal with EF 57%; symptomatic recurrence s/p cardioversion 1-18 and 2-24; s/p AV node ablation 3-24 with pacemaker placement    Pulmonary hypertension (HCC)    Echo 1-18 with peak pulmonary artery pressure 44 mmHg   [2]   Past Surgical History:  Procedure Laterality Date    Appendectomy  07/2000    Ruptured appendicitis    Cardioversion  01/2018    Symptomatic atrial fibrillation    Cardioversion  02/19/2024    Cataract extraction w/  intraocular lens implant Right 04/13/2017    Dr. Rogel @ [facility]    Cataract extraction w/  intraocular lens implant Left 06/2017    Dr. Rogel     Cath pacemaker implant dual  03/04/2024    Ep av node ablation  03/04/2024    Lithotripsy Left 06/2001    Other  07/2011    ORIF distal right radius fracture    Other surgical history  05/16/2024    Cystoscopy with TURBT    Tubal ligation  1976   [3] (Not in a hospital admission)   [4]   Family History  Problem Relation Age of Onset    Diabetes Father     Other (Pancreatic Cancer) Father     Glaucoma Mother     Other (Renal Cell Carcinoma) Brother     Crohn's Disease Brother     Glaucoma Paternal Aunt     Macular degeneration Paternal Aunt    [5]   Social History  Socioeconomic History    Marital status:    Tobacco Use    Smoking status: Never    Smokeless tobacco: Never   Vaping Use    Vaping status: Never Used   Substance and Sexual Activity    Alcohol use: Not Currently     Alcohol/week: 0.8 standard drinks of alcohol     Types: 1 Glasses of wine per week     Drug use: Never    Sexual activity: Not Currently   Other Topics Concern    Caffeine Concern Yes     Comment: 24oz soda daily    Pt has a pacemaker No    Pt has a defibrillator No    Reaction to local anesthetic No

## 2025-04-27 LAB
ATRIAL RATE: 535 BPM
Q-T INTERVAL: 504 MS
QRS DURATION: 184 MS
QTC CALCULATION (BEZET): 544 MS
R AXIS: -43 DEGREES
T AXIS: 99 DEGREES
VENTRICULAR RATE: 70 BPM

## 2025-06-03 ENCOUNTER — TELEPHONE (OUTPATIENT)
Dept: INTERNAL MEDICINE CLINIC | Facility: CLINIC | Age: 89
End: 2025-06-03

## 2025-06-09 PROBLEM — I83.90 VARICOSE VEINS OF LOWER EXTREMITY: Status: ACTIVE | Noted: 2021-09-28

## 2025-06-09 PROBLEM — I82.539 CHRONIC DEEP VEIN THROMBOSIS (DVT) OF POPLITEAL VEIN (HCC): Status: ACTIVE | Noted: 2021-11-09

## 2025-06-10 ENCOUNTER — OFFICE VISIT (OUTPATIENT)
Dept: INTERNAL MEDICINE CLINIC | Facility: CLINIC | Age: 89
End: 2025-06-10
Payer: MEDICARE

## 2025-06-10 VITALS
HEART RATE: 70 BPM | OXYGEN SATURATION: 95 % | DIASTOLIC BLOOD PRESSURE: 86 MMHG | WEIGHT: 175 LBS | TEMPERATURE: 97 F | HEIGHT: 71 IN | SYSTOLIC BLOOD PRESSURE: 152 MMHG | RESPIRATION RATE: 18 BRPM | BODY MASS INDEX: 24.5 KG/M2

## 2025-06-10 DIAGNOSIS — I48.91 ATRIAL FIBRILLATION WITH NORMAL VENTRICULAR RATE (HCC): ICD-10-CM

## 2025-06-10 DIAGNOSIS — I82.539 CHRONIC DEEP VEIN THROMBOSIS (DVT) OF POPLITEAL VEIN, UNSPECIFIED LATERALITY (HCC): ICD-10-CM

## 2025-06-10 DIAGNOSIS — R26.89 BALANCE DISORDER: ICD-10-CM

## 2025-06-10 DIAGNOSIS — I50.22 CHRONIC SYSTOLIC HEART FAILURE (HCC): ICD-10-CM

## 2025-06-10 DIAGNOSIS — R53.1 WEAKNESS: ICD-10-CM

## 2025-06-10 DIAGNOSIS — I95.1 ORTHOSTATIC HYPOTENSION: Primary | ICD-10-CM

## 2025-06-10 DIAGNOSIS — N18.30 STAGE 3 CHRONIC KIDNEY DISEASE, UNSPECIFIED WHETHER STAGE 3A OR 3B CKD (HCC): ICD-10-CM

## 2025-06-10 PROCEDURE — 99214 OFFICE O/P EST MOD 30 MIN: CPT | Performed by: STUDENT IN AN ORGANIZED HEALTH CARE EDUCATION/TRAINING PROGRAM

## 2025-06-10 RX ORDER — MIDODRINE HYDROCHLORIDE 5 MG/1
5 TABLET ORAL
Qty: 180 TABLET | Refills: 3 | Status: SHIPPED | OUTPATIENT
Start: 2025-06-10

## 2025-06-10 NOTE — PROGRESS NOTES
The following individual(s) verbally consented to be recorded using ambient AI listening technology and understand that they can each withdraw their consent to this listening technology at any point by asking the clinician to turn off or pause the recording:YES    Patient name: Rachelle Hunter  Additional names:

## 2025-06-10 NOTE — PROGRESS NOTES
Subjective     Chief Complaint   Patient presents with    [facility]     New pt.     Blood Pressure       History of Present Illness  Rachelle Huntre is an 89 year old female with atrial fibrillation and congestive heart failure who presents to [facility].    She has a history of vasovagal syncope, with the most recent episode in April, resulting in a fall and a hematoma. The hematoma is still present, and she inquires about its management.    She is currently taking midodrine for low blood pressure, which she has been on for a few years. She notes that her blood pressure was high today, which she attributes to taking her medication before the visit. She follows with Dr. Rahman and Dr. Lopez at [facility] and is scheduled to see them in July.    She has a history of atrial fibrillation and has a pacemaker. She is on Xarelto as a blood thinner and reports no awareness of atrial fibrillation since her pacemaker was placed.    She has congestive heart failure with a previously noted ejection fraction of 30-35%.    She has a history of bladder cancer and underwent bladder surgery a little over a year ago. She continues to follow with a urologist.    She reports a history of kidney stones and notes that her kidney function is slightly decreased but stable. She does not take ibuprofen, only Tylenol, to avoid affecting her kidney function.    She mentions having trouble with balance and inquires about the possibility of physical therapy.    She does not smoke and has never smoked consistently.    Results      Past Medical History[1]    Past Surgical History[2]    Allergies[3]    Family History[4]    Social Hx on file[5]      I have personally reviewed and updated the following EMR sections as appropriate: Current medications, Allergies, Problem list, Past Medical History, Past Surgical History, Social History and Family History    Review of Systems   Constitutional: Negative.    Respiratory: Negative.      Cardiovascular: Negative.    Gastrointestinal: Negative.    Skin: Negative.    Neurological: Negative.        Objective     Medications Ordered Prior to Encounter[6]  /86 (BP Location: Right arm, Patient Position: Sitting, Cuff Size: adult)   Pulse 70   Temp 96.7 °F (35.9 °C) (Temporal)   Resp 18   Ht 5' 11\" (1.803 m)   Wt 175 lb (79.4 kg)   SpO2 95%   BMI 24.41 kg/m²   Physical Exam  Vitals reviewed.   Constitutional:       Appearance: Normal appearance.   HENT:      Head: Normocephalic and atraumatic.   Skin:     General: Skin is warm and dry.   Neurological:      General: No focal deficit present.      Mental Status: She is alert and oriented to person, place, and time.   Psychiatric:         Mood and Affect: Mood normal.         Behavior: Behavior normal.         Recent Results (from the past 14 weeks)   Cytology, fluids    Collection Time: 03/04/25  1:23 PM   Result Value Ref Range    Case Report       Non-Gynecologic Cytology                          Case: [id number]                                   Authorizing Provider:  Lina Lundberg MD      Collected:           03/04/2025 01:23 PM          Ordering Location:     [facility]    Received:            03/04/2025 01:23 PM                                 [facility]                                                                     Pathologist:           Gino Contreras MD                                                             Specimen:    Urine                                                                                      General Categorization Benign       Final Diagnosis:         Urine:  Adequacy: Satisfactory for evaluation  General Category: Negative for high-grade urothelial carcinoma ([facility])  Diagnosis:  Urothelial cells present  Red blood cells present        Embedded Images      Final Diagnosis Comment       Studies  which have evaluated the volume of voided urine as an adequacy criterion have concluded that specimens larger than 30 mL are more likely to be cellular/satisfactory.1,2   Clinical correlation is recommended.    1. David SOSA, Margaret DL, Ruben MT. Adequacy in voided urine cytology specimens: The role of volume and a repeat void upon predictive values for high-grade urothelial carcinoma. Cancer Cytopathol. 2015;124:686y416.  2. Oumar RIGGINS. Enough Is enough: Adequacy of voided urine cytology. Cancer Cytopathol. 2015;124:407o234.          Procedure       Specimen A  Cytospins:     2      Clinical Information       D49.4 Bladder Tumor.        Non Gyne Interpretation  Negative      Gross Description       Specimen A  Volume (ml): 20   Color: Yellow     EKG    Collection Time: 04/26/25 11:49 AM   Result Value Ref Range    Ventricular rate 70 BPM    Atrial rate 535 BPM    P-R Interval  ms    QRS Duration 184 ms    Q-T Interval 504 ms    QTC Calculation (Bezet) 544 ms    P Axis  degrees    R Axis -43 degrees    T Axis 99 degrees   RAINBOW DRAW LAVENDER    Collection Time: 04/26/25 11:56 AM   Result Value Ref Range    Hold Lavender Auto Resulted    RAINBOW DRAW LIGHT GREEN    Collection Time: 04/26/25 11:56 AM   Result Value Ref Range    Hold Lt Green Auto Resulted    RAINBOW DRAW BLUE    Collection Time: 04/26/25 11:56 AM   Result Value Ref Range    Hold Blue Auto Resulted    RAINBOW DRAW GOLD    Collection Time: 04/26/25 11:56 AM   Result Value Ref Range    Hold Gold Auto Resulted    CBC With Differential With Platelet    Collection Time: 04/26/25 11:56 AM   Result Value Ref Range    WBC 6.0 4.0 - 11.0 x10(3) uL    RBC 4.32 3.80 - 5.30 x10(6)uL    HGB 12.7 12.0 - 16.0 g/dL    HCT 39.1 35.0 - 48.0 %    MCV 90.5 80.0 - 100.0 fL    MCH 29.4 26.0 - 34.0 pg    MCHC 32.5 31.0 - 37.0 g/dL    RDW-SD 44.4 35.1 - 46.3 fL    RDW 13.2 11.0 - 15.0 %    .0 150.0 - 450.0 10(3)uL    Neutrophil Absolute Prelim 4.26 1.50 -  7.70 x10 (3) uL    Neutrophil Absolute 4.26 1.50 - 7.70 x10(3) uL    Lymphocyte Absolute 1.06 1.00 - 4.00 x10(3) uL    Monocyte Absolute 0.49 0.10 - 1.00 x10(3) uL    Eosinophil Absolute 0.11 0.00 - 0.70 x10(3) uL    Basophil Absolute 0.04 0.00 - 0.20 x10(3) uL    Immature Granulocyte Absolute 0.02 0.00 - 1.00 x10(3) uL    Neutrophil % 71.3 %    Lymphocyte % 17.7 %    Monocyte % 8.2 %    Eosinophil % 1.8 %    Basophil % 0.7 %    Immature Granulocyte % 0.3 %   Basic Metabolic Panel (8)    Collection Time: 04/26/25 11:56 AM   Result Value Ref Range    Glucose 107 (H) 70 - 99 mg/dL    Sodium 142 136 - 145 mmol/L    Potassium 3.4 (L) 3.5 - 5.1 mmol/L    Chloride 105 98 - 112 mmol/L    CO2 28.0 21.0 - 32.0 mmol/L    Anion Gap 9 0 - 18 mmol/L    BUN 10 9 - 23 mg/dL    Creatinine 0.93 0.55 - 1.02 mg/dL    BUN/CREA Ratio 10.8 10.0 - 20.0    Calcium, Total 8.9 8.7 - 10.4 mg/dL    Calculated Osmolality 294 275 - 295 mOsm/kg    eGFR-Cr 59 (L) >=60 mL/min/1.73m2   Troponin I (High Sensitivity)    Collection Time: 04/26/25 11:56 AM   Result Value Ref Range    Troponin I (High Sensitivity) 11 <=34 ng/L   Prothrombin Time (PT)    Collection Time: 04/26/25 11:56 AM   Result Value Ref Range    PT 25.3 (H) 11.6 - 14.8 seconds    INR 2.18 (H) 0.80 - 1.20   PTT, Activated    Collection Time: 04/26/25 11:56 AM   Result Value Ref Range    PTT 45.3 (H) 23.0 - 36.0 seconds   Urinalysis with Culture Reflex    Collection Time: 04/26/25  1:28 PM    Specimen: Urine, clean catch   Result Value Ref Range    Urine Color Light-Yellow Yellow    Clarity Urine Turbid (A) Clear    Spec Gravity 1.007 1.005 - 1.030    Glucose Urine Normal Normal mg/dL    Bilirubin Urine Negative Negative    Ketones Urine Negative Negative mg/dL    Blood Urine 1+ (A) Negative    pH Urine 6.5 5.0 - 8.0    Protein Urine Negative Negative mg/dL    Urobilinogen Urine Normal Normal    Nitrite Urine Negative Negative    Leukocyte Esterase Urine 250 (A) Negative    WBC Urine  1-5 0 - 5 /HPF    RBC Urine 3-5 (A) 0 - 2 /HPF    Bacteria Urine Rare (A) None Seen /HPF    Squamous Epi. Cells Few (A) None Seen /HPF    Renal Tubular Epithelial Cells None Seen None Seen /HPF    Transitional Cells None Seen None Seen /HPF    Hyaline Casts Present (A) None Seen /LPF    Yeast Urine None Seen None Seen /HPF   Urine Culture, Routine    Collection Time: 04/26/25  1:28 PM    Specimen: Urine, clean catch   Result Value Ref Range    Urine Culture       <10,000 cfu/ml Multiple species present- probable contamination.       Assessment and Plan      Orthostatic hypotension (Primary)  -     Midodrine HCl; Take 1 tablet (5 mg total) by mouth 2 (two) times daily before meals.  Dispense: 180 tablet; Refill: 3  Chronic systolic heart failure (HCC)  Atrial fibrillation with normal ventricular rate (HCC)  Chronic deep vein thrombosis (DVT) of popliteal vein, unspecified laterality (HCC)  Stage 3 chronic kidney disease, unspecified whether stage 3a or 3b CKD (HCC)  Weakness  -     Physical Therapy Referral - [facility]  Balance disorder  -     Physical Therapy Referral - [facility]      Assessment & Plan  Vasovagal Syncope  Experienced syncope in April with hematoma formation. Advised against heat application.    Hypotension with elevated blood pressure today and in April.   - Refill midodrine prescription.  - Instruct to monitor blood pressure at home.  - If blood pressure consistently >140/90 mmHg, reduce midodrine dose by half.  - Discuss midodrine use with cardiologist in July.    Atrial Fibrillation  Managed with Xarelto. Pacemaker placed, no current atrial fibrillation.    Congestive Heart Failure  Ejection fraction 30-35%.    Chronic Kidney Disease  GFR 59, previously 55. Advised against ibuprofen use.    Balance Issues  Reports balance issues. Physical therapy recommended.  - Refer to physical therapy at [facility].    Bladder Cancer  Status post-surgery, ongoing urologist  follow-up.    Follow-up  Annual Medicare visit scheduled for November 4th.       No follow-ups on file.    Mann Ball MD  Internal Medicine  6/10/2025       [1]   Past Medical History:   Acute systolic CHF (congestive heart failure) (HCC)    Cardiac cath 1-18 with no obstructive CAD; Echo 1-18 with EF 35-40% ;Lexiscan GXT 6-22 with mild LVE, normal perfusion, EF 62%; Echo 2-24 with mild LVH, EF 30-35%, mild MINOR, mild MR    Aortic atherosclerosis    CXR 12-17    Arrhythmia    Atrial fibrillation with rapid ventricular response (HCC)    BCC (basal cell carcinoma)    right tip of nose    Bladder cancer (HCC)    Low-grade papillary urothelial carcinoma, non-invasive s/p TURBT    Cholelithiasis    CT scan 4-24    Chronic kidney disease, stage 3 (HCC)    Congestive heart disease (HCC)    Essential hypertension    Glaucoma    2012- START Latanoprost qhs OS, based on OCT    Herpes zoster    Right leg    Kidney stones    Treated with lithotripsy    Left bundle branch block    Left leg DVT (HCC)    Partially occlusive thrombus left proximal femoral vein    LV dysfunction    Echo 1-18 with EF 35-40%; cardiac cath 1-18 with EF 40% and no obstructive CAD; Echo 7-20 with EF 40-45%, mild MR, mild TR    Mitral regurgitation    Echo 1-18 with moderate-severe MR and TR; JAI 1-18 with moderate-severe MR    Orthostatic hypotension    Osteopenia    T score -1.3 hip and -0.7 lumbar spine    Paroxysmal atrial fibrillation (HCC)    Nuclear treadmill stress test 7-15 normal with EF 57%; symptomatic recurrence s/p cardioversion 1-18 and 2-24; s/p AV node ablation 3-24 with pacemaker placement    Pulmonary hypertension (HCC)    Echo 1-18 with peak pulmonary artery pressure 44 mmHg   [2]   Past Surgical History:  Procedure Laterality Date    Appendectomy  07/2000    Ruptured appendicitis    Cardioversion  01/2018    Symptomatic atrial fibrillation    Cardioversion  02/19/2024    Cataract extraction w/  intraocular lens implant Right  04/13/2017    Dr. Rogel @ [facility]    Cataract extraction w/  intraocular lens implant Left 06/2017    Dr. Rogel     Cath pacemaker implant dual  03/04/2024    Ep av node ablation  03/04/2024    Lithotripsy Left 06/2001    Other  07/2011    ORIF distal right radius fracture    Other surgical history  05/16/2024    Cystoscopy with TURBT    Tubal ligation  1976   [3]   Allergies  Allergen Reactions    Sulfa Antibiotics HIVES   [4]   Family History  Problem Relation Age of Onset    Diabetes Father     Other (Pancreatic Cancer) Father     Glaucoma Mother     Other (Renal Cell Carcinoma) Brother     Crohn's Disease Brother     Glaucoma Paternal Aunt     Macular degeneration Paternal Aunt    [5]   Social History  Socioeconomic History    Marital status:    Tobacco Use    Smoking status: Never    Smokeless tobacco: Never   Vaping Use    Vaping status: Never Used   Substance and Sexual Activity    Alcohol use: Not Currently     Alcohol/week: 0.8 standard drinks of alcohol     Types: 1 Glasses of wine per week    Drug use: Never    Sexual activity: Not Currently   Other Topics Concern    Caffeine Concern Yes     Comment: 24oz soda daily    Pt has a pacemaker No    Pt has a defibrillator No    Reaction to local anesthetic No   [6]   Current Outpatient Medications on File Prior to Visit   Medication Sig Dispense Refill    acetaminophen 325 MG Oral Tab Take 2 tablets (650 mg total) by mouth every 6 (six) hours as needed for Pain. 20 tablet 0    polyethylene glycol, PEG 3350, 17 g Oral Powd Pack Take 17 g by mouth daily as needed. 30 each 0    clobetasol 0.05 % External Ointment Apply 1 Application topically twice a week. Twice weekly for maintenance, if experiencing flare: increase usage to 2x/daily for 2 weeks followed by daily for 2 weeks, then back to maintenance 2x/week 60 g 3    estradiol (ESTRACE) 0.1 MG/GM Vaginal Cream Apply 1/2 gram vaginally 2-3 times per week. 42 g 3    latanoprost 0.005 % Ophthalmic Solution  INSTILL 1 DROP IN BOTH EYES AT BEDTIME 7.5 mL 3    XARELTO 20 MG Oral Tab Take 1 tablet (20 mg total) by mouth daily with food.      docusate sodium 100 MG Oral Cap Take 1 capsule (100 mg total) by mouth 2 (two) times daily. (Patient not taking: Reported on 6/10/2025) 30 capsule 11    senna-docusate 8.6-50 MG Oral Tab Take 1 tablet by mouth daily. (Patient not taking: Reported on 6/10/2025) 30 tablet 0     Current Facility-Administered Medications on File Prior to Visit   Medication Dose Route Frequency Provider Last Rate Last Admin    ciprofloxacin (Cipro) tab 500 mg  500 mg Oral Once Lina Lundberg MD        lidocaine (Urojet) 2 % urethral jelly 10 mL  10 mL Intravesical Once Marce Baker DO

## 2025-06-17 ENCOUNTER — OFFICE VISIT (OUTPATIENT)
Dept: PHYSICAL THERAPY | Facility: HOSPITAL | Age: 89
End: 2025-06-17
Attending: STUDENT IN AN ORGANIZED HEALTH CARE EDUCATION/TRAINING PROGRAM
Payer: MEDICARE

## 2025-06-17 DIAGNOSIS — R26.89 BALANCE DISORDER: ICD-10-CM

## 2025-06-17 DIAGNOSIS — R53.1 WEAKNESS: Primary | ICD-10-CM

## 2025-06-17 PROCEDURE — 97163 PT EVAL HIGH COMPLEX 45 MIN: CPT

## 2025-06-17 PROCEDURE — 97112 NEUROMUSCULAR REEDUCATION: CPT

## 2025-06-17 NOTE — PROGRESS NOTES
NEUROLOGICAL EVALUATION:     Diagnosis:   Weakness (R53.1) Balance disorder (R26.89) Patient:  Rachelle DE LA TORRE Young (89 year old, female)        Referring Provider: Mann Ball  Today's Date   6/17/2025    Precautions:  Pacemaker; Fall Risk; Cancer (H/o HTN, CFH (EF 30-35%), CKD, a-fib on Xarelto (BLOOD THINNER) and has pacemaker, and h/o bladder cancer (had surgery last year)) Date of Evaluation: 06/17/25       PATIENT SUMMARY     Summary of chief complaints: imbalance and recurrent falls.  History of current condition: Pt went to ED on 4/26/25 due to a vasovagal syncopal episode resulting in a fall. Pt was using the bathroom, sitting on the toilet and passed out falling forward and hitting her face on the tile. She was having a colitis flare and believes she was very dehydrated. Pt landed on her face resulting in bruising and swelling on the face and forehead. Noticed worsening in her balance at least 1 year ago.   Pain level: current 0 /10, at best 0 /10, at worst 0 /10  Description of symptoms: general sense of imbalance   Occupation: retired   Leisure activities/Hobbies: pt enjoys bridge and reads; tennis and golf   Prior level of function: independent  Current limitations: reaching above her head (fearful of falling backwards), turning while reaching or changing directions, stair negotiation and stepping up curbs, sitting to standing (avoids lower chairs), and walking on uneven surfaces like grass.  Pt goals: improve balance  History of falls: Yes 2 falls in the last 6 months-1 year. Fell in January, was in the garage waiting for her son to pick her up. The exit door would not open, so she went though the garage and tripped over a post on the ground and fell.  Hematoma on the knee from that fall. Other fall was described above.   Home Environment: lives in a condo on the 6th floor with an elevator, goes into the garage (no stairs to enter can be avoided); walk in shower with shower bars; lives  alone.   ADLs: uses a SPC in the right hand     Past medical history was reviewed with Rachelle.  Significant findings include:    Imaging/Tests: CT facial bones: 3 x 6 cm hematoma in the bifrontal/forehead scalp with inflammatory changes extending from this hematoma across both perioral regions of the face. Acute mildly comminuted nasal bone fracture. CT brain/head 2 x 6 cm hematoma in the bifrontal/forehead scalp. There is associated soft tissue inflammation extending into the bilateral periorbital region of the face compatible with soft tissue injury. Acute nasal bone fracture.  Small left pleural effusion. Mild interstitial edema. Left basal pulmonary opacity which could represent edema, atelectasis, or pneumonia. Post cardiac pacing device.   Rachelle  has a past medical history of Acute systolic CHF (congestive heart failure) ([facility]) (12/2017), Aortic atherosclerosis, Arrhythmia, Atrial fibrillation with rapid ventricular response ([facility]) (12/31/2017), BCC (basal cell carcinoma) (2021), Bladder cancer ([facility]) (05/2024), Cholelithiasis, Chronic kidney disease, stage 3 ([facility]), Congestive heart disease ([facility]), Essential hypertension, Glaucoma (2012), Herpes zoster (09/2010), Kidney stones (06/2001), Left bundle branch block, Left leg DVT ([facility]) (10/2021), LV dysfunction, Mitral regurgitation, Orthostatic hypotension, Osteopenia (09/2011), Paroxysmal atrial fibrillation ([facility]) (03/2000), and Pulmonary hypertension ([facility]).  She  has a past surgical history that includes tubal ligation (1976); appendectomy (07/2000); lithotripsy (Left, 06/2001); other (07/2011); Cataract extraction w/  intraocular lens implant (Right, 04/13/2017); Cataract extraction w/  intraocular lens implant (Left, 06/2017); cardioversion (01/2018); cardioversion (02/19/2024); cath pacemaker implant dual (03/04/2024); ep av node ablation (03/04/2024); and other surgical history (05/16/2024).    ASSESSMENT  Rachelle presents to physical therapy evaluation  with primary c/o imbalance and recurrent falls.. The results of the objective tests and measures show moderate fall risk and heavy visual reliance for balance.. Functional deficits include but are not limited to reaching above her head (fearful of falling backwards), turning while reaching or changing directions, stair negotiation and stepping up curbs, sitting to standing (avoids lower chairs), and walking on uneven surfaces like grass.. Signs and symptoms are consistent with diagnosis of imbalance and moderate fall risk. Pt and PT discussed evaluation findings, pathology, POC and HEP.  Pt voiced understanding and performs HEP correctly without reported pain. Skilled Physical Therapy is medically necessary to address the above impairments and reach functional goals.    OBJECTIVE:      Musculoskeletal:  Observation/Posture: unsteadiness upon standing and walking from the lobby; pt has healing hematoma on her forehead, no ecchymosis or erythema.     Neurological:  Coordination:  Finger to Nose: slow speed   Pronation/Supination: WNL   Toe Tap: WNL     Sensation: intact          Deep Tendon Reflexes: WNL except NT   Tone: WNL except NT     Pathological Reflexes:  Babinski: NT   Clonus: NT   Griffin's Sign: NT          Balance and Functional Mobility:    Mobility / Transfers Level of Assistance   Bed Mobility IND   Supine --> Sit IND   Sit --> Supine IND   Sit --> Stand MOD I (use of arm rest required)   Stand --> Sit MOD I (use of arm rest required)   Chair --> Chair MOD I (use of arm rest required)     Postural Control:  Romberg EO: level surface 30 sec; compliant surface 30 sec     Romberg EC: level surface 0 sec; compliant surface 0 sec  Tandem Stance: R back: 0 sec; L back: 0 sec; Fall Risk: Yes  SLS: R: 0 sec; L: 0 sec; Fall Risk: Yes    Gait: pt ambulates on level ground with assistive device (looking at the ground, veering from the path occasionally.)  Timed Up and Go (AD,time): 13.5 sec (with SPC; 12.68  without AD); Fall Risk: Yes  5x Sit -->Stand: 21.42 sec (requires use of arm rests 2UE support); Fall Risk: Yes    Jaramillo Balance Scale    SITTING TO STANDING  (3)  4 able to stand without using hands and stabilize independently  3 able to stand independently using hands  2 able to stand using hands after several tries  1 needs minimal aid to stand or stabilize  0 needs moderate or maximal assist to stand    STANDING UNSUPPORTED  (4)  4 able to stand safely for 2 minutes  3 able to stand 2 minutes with supervision  2 able to stand 30 seconds unsupported  1 needs several tries to stand 30 seconds unsupported  0 unable to stand 30 seconds unsupported    SITTING WITH BACK UNSUPPORTED BUT FEET SUPPORTED ON FLOOR OR ON A STOOL  (4)  4 able to sit safely and securely for 2 minutes  3 able to sit 2 minutes under supervision  2 able to able to sit 30 seconds  1 able to sit 10 seconds  0 unable to sit without support 10 seconds    STANDING TO SITTING  (3)  4 sits safely with minimal use of hands  3 controls descent by using hands  2 uses back of legs against chair to control descent  1 sits independently but has uncontrolled descent  0 needs assist to sit    TRANSFERS  (3)  4 able to transfer safely with minor use of hands  3 able to transfer safely definite need of hands  2 able to transfer with verbal cuing and/or supervision  1 needs one person to assist  0 needs two people to assist or supervise to be safe    STANDING UNSUPPORTED WITH EYES CLOSED  (4)  4 able to stand 10 seconds safely  3 able to stand 10 seconds with supervision   2 able to stand 3 seconds  1 unable to keep eyes closed 3 seconds but stays safely  0 needs help to keep from falling    STANDING UNSUPPORTED WITH FEET TOGETHER (1)  4 able to place feet together independently and stand 1 minute safely  3 able to place feet together independently and stand 1 minute with supervision  2 able to place feet together independently but unable to hold for 30 seconds  1  needs help to attain position but able to stand 15 seconds feet together  0 needs help to attain position and unable to hold for 15 seconds    REACHING FORWARD WITH OUTSTRETCHED ARM WHILE STANDING  (4)  4 can reach forward confidently 25 cm (10 inches)  3 can reach forward 12 cm (5 inches)  2 can reach forward 5 cm (2 inches)  1 reaches forward but needs supervision  0 loses balance while trying/requires external support     OBJECT FROM THE FLOOR FROM A STANDING POSITION  (3)  4 able to  slipper safely and easily  3 able to  slipper but needs supervision   2 unable to  but reaches 2-5 cm(1-2 inches) from slipper and keeps balance independently  1 unable to  and needs supervision while trying  0 unable to try/needs assist to keep from losing balance or falling    TURNING TO LOOK BEHIND OVER LEFT AND RIGHT SHOULDERS WHILE STANDING  (4)  4 looks behind from both sides and weight shifts well  3 looks behind one side only other side shows less weight shift  2 turns sideways only but maintains balance  1 needs supervision when turning  0 needs assist to keep from losing balance or falling    TURN 360 DEGREES  (3)  4 able to turn 360 degrees safely in 4 seconds or less  3 able to turn 360 degrees safely one side only 4 seconds or less  2 able to turn 360 degrees safely but slowly  1 needs close supervision or verbal cuing  0 needs assistance while turning    PLACE ALTERNATE FOOT ON STEP OR STOOL WHILE STANDING UNSUPPORTED  (2)  4 able to stand independently and safely and complete 8 steps in 20 seconds  3 able to stand independently and complete 8 steps in > 20 seconds  2 able to complete 4 steps without aid with supervision  1 able to complete > 2 steps needs minimal assist  0 needs assistance to keep from falling/unable to try    STANDING UNSUPPORTED ONE FOOT IN FRONT  (0)  4 able to place foot tandem independently and hold 30 seconds  3 able to place foot ahead independently and hold  30 seconds  2 able to take small step independently and hold 30 seconds  1 needs help to step but can hold 15 seconds  0 loses balance while stepping or standing    STANDING ON ONE LEG (0)  4 able to lift leg independently and hold > 10 seconds  3 able to lift leg independently and hold 5-10 seconds  2 able to lift leg independently and hold >= 3 seconds  1 tries to lift leg unable to hold 3 seconds but remains standing independently.  0 unable to try of needs assist to prevent fall    Total score: 38/56    0-20: High fall risk, person will likely need the assistance of a wheelchair to move around safely.  21-40: Medium fall risk, person will need some type of walking assistance, such as a cane or a walker.  41-56: Low fall risk.      Today's Treatment and Response:   Pt education was provided on exam findings, treatment diagnosis, treatment plan, expectations, and prognosis.    Today's Treatment       6/17/2025   Neuro Treatment   Neuro Re-Education Pt edu and reviewing exercises in HEP    Adjusting cane height   Neuro Re-Educ Minutes 15   Evaluation Minutes 30   Total Time Of Timed Procedures 15   Total Time Of Service-Based Procedures 30   Total Treatment Time 45   HEP - Sit to Stand with Armchair  - 2-3 x daily - 7 x weekly - 5 reps  - Alternating Step Taps with Counter Support  - 1 x daily - 7 x weekly - 2 sets - 20 reps        Patient was instructed in and issued a HEP for: - Sit to Stand with Armchair  - 2-3 x daily - 7 x weekly - 5 reps  - Alternating Step Taps with Counter Support  - 1 x daily - 7 x weekly - 2 sets - 20 reps    Charges:  PT EVAL: High Complexity, 1 NMR  In agreement with evaluation findings and clinical rationale, this evaluation involved HIGH COMPLEXITY decision making due to 3 or more personal factors/comorbidities, 4 or more body structures involved/activity limitations, and unstable symptoms as documented in the evaluation.                                                        PLAN OF  CARE:      Goals: (to be met in 10 visits)   Patient will improve Jaramillo Balance assessment to >41/56 in order to demonstrate improved safety with ADL tasks like cooking and cleaning around the house.   Patient will complete TUG testing in <11 seconds with LRAD in order to demonstrate improved gait mechanics for navigating around the home with decreased risk of falls.   Patient will perform 5xSTS testing in <14 seconds with use of UE in order to demonstrate adequate strength and stability to get up from a recliner/low couch with minimal assistance.   Patient will be independent and compliant with HEP in order to maintain functional gains seen in therapy sessions.         Frequency / Duration: Patient will be seen 1-2x/week or a total of 10 visits over a 90 day period. Treatment will include: Neuromuscular Re-education; Therapeutic Activities; Therapeutic Exercise; Home Exercise Program instruction    Education or treatment limitation: None   Rehab Potential: good     FES Score  Score: (Patient-Rptd) 43.75 % (6/12/2025  8:06 AM)    Score and level of concern: (Patient-Rptd) 28 - high concern (6/12/2025  8:06 AM)      Patient/Family/Caregiver was advised of these findings, precautions, and treatment options and has agreed to actively participate in planning and for this course of care.    Thank you for your referral. Please co-sign or sign and return this letter via fax as soon as possible to [phone]. If you have any questions, please contact me at Dept: [phone]    Sincerely,  Electronically signed by therapist: Dereje Mar PT  Physician's certification required: Yes  I certify the need for these services furnished under this plan of treatment and while under my care.    X___________________________________________________ Date____________________    Certification From: 6/17/2025  To: 9/15/2025

## 2025-06-27 ENCOUNTER — OFFICE VISIT (OUTPATIENT)
Dept: PHYSICAL THERAPY | Facility: HOSPITAL | Age: 89
End: 2025-06-27
Attending: STUDENT IN AN ORGANIZED HEALTH CARE EDUCATION/TRAINING PROGRAM
Payer: MEDICARE

## 2025-06-27 PROCEDURE — 97112 NEUROMUSCULAR REEDUCATION: CPT

## 2025-06-27 PROCEDURE — 97110 THERAPEUTIC EXERCISES: CPT

## 2025-06-27 NOTE — PROGRESS NOTES
Patient: Rachelle Hunter (89 year old, female) Referring Provider:  Insurance:   Diagnosis: Weakness (R53.1) Balance disorder (R26.89) Mann Ball  MEDICARE   Date of Surgery: No data recorded Next MD visit:  [facility] [place] [facility]   Precautions:  Pacemaker; Fall Risk; Cancer (H/o HTN, CFH (EF 30-35%), CKD, a-fib on Xarelto (BLOOD THINNER) and has pacemaker, and h/o bladder cancer (had surgery last year)) No data recorded Referral Information:    Date of Evaluation: Req: 0, Auth: 0, Exp:     06/17/25 POC Auth Visits:  10       Today's Date   6/27/2025    Subjective  Pt reports feeling nauseated since this morning. Denies heart racing or lightheadedness. Pt believes the exercises are helping.       Pain: pain not reported     Objective  see flowsheet: vitals taken            Assessment  Pt was intially nauseous at the beginning of the session but had normal vitals. By the end of the session she felt fine. No overt LOB in the session today.    Goals (to be met in 10 visits)   Patient will improve Jaramillo Balance assessment to >41/56 in order to demonstrate improved safety with ADL tasks like cooking and cleaning around the house.   Patient will complete TUG testing in <11 seconds with LRAD in order to demonstrate improved gait mechanics for navigating around the home with decreased risk of falls.   Patient will perform 5xSTS testing in <14 seconds with use of UE in order to demonstrate adequate strength and stability to get up from a recliner/low couch with minimal assistance.   Patient will be independent and compliant with HEP in order to maintain functional gains seen in therapy sessions.           Plan  continue to focus on dynamic balance training    Treatment Last 4 Visits  Treatment Day: 2       6/17/2025 6/27/2025   Neuro Treatment   Therapeutic Exercise  Vitals at the beginning of the session, sitting at rest  BP:130/77 (90)  SpO2: 96%  HR: 70 bpm    Nustep lvl 2 arms and legs x10'     Neuro Re-Education Pt edu  and reviewing exercises in HEP    Adjusting cane height Hurdles over PVC pipes (x4)  - lead R and L x2  - reciprocal x3  - lateral x2     Weaving through cones x2   Bending to  cones x1 (5-6 cones)     Walking in hallway head turns to read letters off post-it  -name something with that letter  -name a boy/girl name  -name a food   Therapeutic Exercise Minutes  15   Neuro Re-Educ Minutes 15 30   Evaluation Minutes 30    Total Time Of Timed Procedures 15 45   Total Time Of Service-Based Procedures 30 0   Total Treatment Time 45 45   HEP - Sit to Stand with Armchair  - 2-3 x daily - 7 x weekly - 5 reps  - Alternating Step Taps with Counter Support  - 1 x daily - 7 x weekly - 2 sets - 20 reps         HEP  - Sit to Stand with Armchair  - 2-3 x daily - 7 x weekly - 5 reps  - Alternating Step Taps with Counter Support  - 1 x daily - 7 x weekly - 2 sets - 20 reps    Charges  1 TE 2 NMR

## 2025-06-30 ENCOUNTER — TELEPHONE (OUTPATIENT)
Dept: PHYSICAL THERAPY | Facility: HOSPITAL | Age: 89
End: 2025-06-30

## 2025-07-02 ENCOUNTER — TELEPHONE (OUTPATIENT)
Dept: INTERNAL MEDICINE CLINIC | Facility: CLINIC | Age: 89
End: 2025-07-02

## 2025-07-02 ENCOUNTER — OFFICE VISIT (OUTPATIENT)
Dept: PHYSICAL THERAPY | Facility: HOSPITAL | Age: 89
End: 2025-07-02
Attending: STUDENT IN AN ORGANIZED HEALTH CARE EDUCATION/TRAINING PROGRAM
Payer: MEDICARE

## 2025-07-02 PROCEDURE — 97112 NEUROMUSCULAR REEDUCATION: CPT

## 2025-07-02 NOTE — TELEPHONE ENCOUNTER
Pt. Overdue for care gaps Blood pressure check. Can schedule follow-up with  or discuss during next appointment on 11-4-25 with Dr. Ball. Letter sent via [employer] as reminder.

## 2025-07-02 NOTE — PROGRESS NOTES
Patient: Rachelle Hunter (89 year old, female) Referring Provider:  Insurance:   Diagnosis: Weakness (R53.1) Balance disorder (R26.89) Mann Ball  MEDICARE   Date of Surgery: No data recorded Next MD visit:  [facility] [place] [facility]   Precautions:  Pacemaker; Fall Risk; Cancer (H/o HTN, CFH (EF 30-35%), CKD, a-fib on Xarelto (BLOOD THINNER) and has pacemaker, and h/o bladder cancer (had surgery last year)) No data recorded Referral Information:    Date of Evaluation: Req: 0, Auth: 0, Exp:     06/17/25 POC Auth Visits:  10       Today's Date   7/2/2025    Subjective  Pt has been feeling good, no nausea. Pt says her legs feel stronger.       Pain: pain not reported     Objective  see flowsheet            Assessment  Pt requires Derrick for balance training today, but no over LOB. She did require ModA to recover while performing reciprocal hurdles leading with the left leg. She does have ankle instability which may contribute to the imbalance.    Goals (to be met in 10 visits)   Patient will improve Jaramillo Balance assessment to >41/56 in order to demonstrate improved safety with ADL tasks like cooking and cleaning around the house.   Patient will complete TUG testing in <11 seconds with LRAD in order to demonstrate improved gait mechanics for navigating around the home with decreased risk of falls.   Patient will perform 5xSTS testing in <14 seconds with use of UE in order to demonstrate adequate strength and stability to get up from a recliner/low couch with minimal assistance.   Patient will be independent and compliant with HEP in order to maintain functional gains seen in therapy sessions.             Plan  continue to focus on dynamic balance training    Treatment Last 4 Visits  Treatment Day: 3       6/17/2025 6/27/2025 7/2/2025   Neuro Treatment   Therapeutic Exercise  Vitals at the beginning of the session, sitting at rest  BP:130/77 (90)  SpO2: 96%  HR: 70 bpm    Nustep lvl 2 arms and legs x10'      Neuro Re-Education  Pt edu and reviewing exercises in HEP    Adjusting cane height Hurdles over PVC pipes (x4)  - lead R and L x2  - reciprocal x3  - lateral x2     Weaving through cones x2   Bending to  cones x1 (5-6 cones)     Walking in hallway head turns to read letters off post-it  -name something with that letter  -name a boy/girl name  -name a food SLS 6\" step taps alt x20   SLS double taps 4\" alt x20    Hurdles over 6\" hurdles x3 by ballet bar (1UE use to 0UE, María for safety)  - lead R and L x3  - lateral x3  - reciprocal x3-4    Tiltboard taps ML and AP (middle level) x2' (2UE>1UE>0UE with min-mod A for safety)    STS x5  STS with overhead raise x5    Wide WILVER on airex in// bars  +added head turns and nods x30\" ea  +EC María    Walking laps around clinic holding tray with cone, then tennis ball    Therapeutic Exercise Minutes  15    Neuro Re-Educ Minutes 15 30 42   Evaluation Minutes 30     Total Time Of Timed Procedures 15 45 42   Total Time Of Service-Based Procedures 30 0 0   Total Treatment Time 45 45 42   HEP - Sit to Stand with Armchair  - 2-3 x daily - 7 x weekly - 5 reps  - Alternating Step Taps with Counter Support  - 1 x daily - 7 x weekly - 2 sets - 20 reps          HEP  - Sit to Stand with Armchair  - 2-3 x daily - 7 x weekly - 5 reps  - Alternating Step Taps with Counter Support  - 1 x daily - 7 x weekly - 2 sets - 20 reps    Charges  3 NMR

## 2025-07-07 ENCOUNTER — TELEPHONE (OUTPATIENT)
Dept: PHYSICAL THERAPY | Facility: HOSPITAL | Age: 89
End: 2025-07-07

## 2025-07-09 ENCOUNTER — APPOINTMENT (OUTPATIENT)
Dept: PHYSICAL THERAPY | Facility: HOSPITAL | Age: 89
End: 2025-07-09
Attending: STUDENT IN AN ORGANIZED HEALTH CARE EDUCATION/TRAINING PROGRAM
Payer: MEDICARE

## 2025-07-11 ENCOUNTER — OFFICE VISIT (OUTPATIENT)
Dept: PHYSICAL THERAPY | Facility: HOSPITAL | Age: 89
End: 2025-07-11
Attending: STUDENT IN AN ORGANIZED HEALTH CARE EDUCATION/TRAINING PROGRAM
Payer: MEDICARE

## 2025-07-11 PROCEDURE — 97110 THERAPEUTIC EXERCISES: CPT

## 2025-07-11 PROCEDURE — 97112 NEUROMUSCULAR REEDUCATION: CPT

## 2025-07-11 NOTE — PROGRESS NOTES
Patient: Rachelle Hunter (89 year old, female) Referring Provider:  Insurance:   Diagnosis: Weakness (R53.1) Balance disorder (R26.89) Mann Ball  MEDICARE   Date of Surgery: No data recorded Next MD visit:  [facility] [place] [facility]   Precautions:  Pacemaker; Fall Risk; Cancer (H/o HTN, CFH (EF 30-35%), CKD, a-fib on Xarelto (BLOOD THINNER) and has pacemaker, and h/o bladder cancer (had surgery last year)) No data recorded Referral Information:    Date of Evaluation: Req: 0, Auth: 0, Exp:     06/17/25 POC Auth Visits:  10       Today's Date   7/11/2025    Subjective  Pt said things have been good. She cancelled her last session due to increased back pain.       Pain: pain not reported     Objective  see flowsheet            Assessment  No LOB today. Her confidence is improving allowing faster gait speed and reduced visual reliance while walking.    Goals (to be met in 10 visits)   Patient will improve Jaramillo Balance assessment to >41/56 in order to demonstrate improved safety with ADL tasks like cooking and cleaning around the house.   Patient will complete TUG testing in <11 seconds with LRAD in order to demonstrate improved gait mechanics for navigating around the home with decreased risk of falls.   Patient will perform 5xSTS testing in <14 seconds with use of UE in order to demonstrate adequate strength and stability to get up from a recliner/low couch with minimal assistance.   Patient will be independent and compliant with HEP in order to maintain functional gains seen in therapy sessions.               Plan  continue to focus on dynamic balance training    Treatment Last 4 Visits  Treatment Day: 4       6/17/2025 6/27/2025 7/2/2025 7/11/2025   Neuro Treatment   Therapeutic Exercise  Vitals at the beginning of the session, sitting at rest  BP:130/77 (90)  SpO2: 96%  HR: 70 bpm    Nustep lvl 2 arms and legs x10'    Nustep lvl 3 arms and legs x8'    Shuttle DLP 55# x10; YTB hip abd iso x10    Neuro Re-Education Pt  edu and reviewing exercises in HEP    Adjusting cane height Hurdles over PVC pipes (x4)  - lead R and L x2  - reciprocal x3  - lateral x2     Weaving through cones x2   Bending to  cones x1 (5-6 cones)     Walking in hallway head turns to read letters off post-it  -name something with that letter  -name a boy/girl name  -name a food SLS 6\" step taps alt x20   SLS double taps 4\" alt x20    Hurdles over 6\" hurdles x3 by ballet bar (1UE use to 0UE, María for safety)  - lead R and L x3  - lateral x3  - reciprocal x3-4    Tiltboard taps ML and AP (middle level) x2' (2UE>1UE>0UE with min-mod A for safety)    STS x5  STS with overhead raise x5    Wide WILVER on airex in// bars  +added head turns and nods x30\" ea  +EC María    Walking laps around clinic holding tray with cone, then tennis ball  Hurdles over 6\" hurdles 0UE use  - lead R and L x2  - lateral x2    Tandem stance mod 3x30\" ea    4\" step up x10   6\" step up x10    Therapeutic Exercise Minutes  15  17   Neuro Re-Educ Minutes 15 30 42 23   Evaluation Minutes 30      Total Time Of Timed Procedures 15 45 42 40   Total Time Of Service-Based Procedures 30 0 0 0   Total Treatment Time 45 45 42 40   HEP - Sit to Stand with Armchair  - 2-3 x daily - 7 x weekly - 5 reps  - Alternating Step Taps with Counter Support  - 1 x daily - 7 x weekly - 2 sets - 20 reps   - Standing Romberg to 3/4 Tandem Stance  - 1 x daily - 7 x weekly - 3 sets - 30 sec hold        HEP  - Standing Romberg to 3/4 Tandem Stance  - 1 x daily - 7 x weekly - 3 sets - 30 sec hold    Charges  1 TE 2 NMR

## 2025-07-18 ENCOUNTER — OFFICE VISIT (OUTPATIENT)
Dept: PHYSICAL THERAPY | Facility: HOSPITAL | Age: 89
End: 2025-07-18
Attending: STUDENT IN AN ORGANIZED HEALTH CARE EDUCATION/TRAINING PROGRAM
Payer: MEDICARE

## 2025-07-18 PROCEDURE — 97112 NEUROMUSCULAR REEDUCATION: CPT

## 2025-07-18 NOTE — PROGRESS NOTES
Patient: Rachelle Hunter (89 year old, female) Referring Provider:  Insurance:   Diagnosis: Weakness (R53.1) Balance disorder (R26.89) Mann Ball  MEDICARE   Date of Surgery: No data recorded Next MD visit:  [facility] [place] [employer]   Precautions:  Pacemaker; Fall Risk; Cancer (H/o HTN, CFH (EF 30-35%), CKD, a-fib on Xarelto (BLOOD THINNER) and has pacemaker, and h/o bladder cancer (had surgery last year)) No data recorded Referral Information:    Date of Evaluation: Req: 0, Auth: 0, Exp:     06/17/25 POC Auth Visits:  10       Today's Date   7/18/2025    Subjective  Pt has been feeling good.       Pain: pain not reported     Objective  see flowsheet            Assessment  Pt continues to be visually reliance while walking. BBS increased from 38/56 to 45/56- difficulty with tandem, SLS, and requires UE support with STS and transfers.    Goals (to be met in 10 visits)   Patient will improve Jaramillo Balance assessment to >41/56 in order to demonstrate improved safety with ADL tasks like cooking and cleaning around the house. met  Patient will complete TUG testing in <11 seconds with LRAD in order to demonstrate improved gait mechanics for navigating around the home with decreased risk of falls. Ongoing, improved  Patient will perform 5xSTS testing in <14 seconds with use of UE in order to demonstrate adequate strength and stability to get up from a recliner/low couch with minimal assistance. ongoing  Patient will be independent and compliant with HEP in order to maintain functional gains seen in therapy sessions.  ongoing               Plan  continue to focus on dynamic balance training    Treatment Last 4 Visits  Treatment Day: 5 6/27/2025 7/2/2025 7/11/2025 7/18/2025   Neuro Treatment   Therapeutic Exercise Vitals at the beginning of the session, sitting at rest  BP:130/77 (90)  SpO2: 96%  HR: 70 bpm    Nustep lvl 2 arms and legs x10'    Nustep lvl 3 arms and legs x8'    Shuttle DLP 55# x10; YTB hip abd iso x10      Neuro Re-Education Hurdles over PVC pipes (x4)  - lead R and L x2  - reciprocal x3  - lateral x2     Weaving through cones x2   Bending to  cones x1 (5-6 cones)     Walking in hallway head turns to read letters off post-it  -name something with that letter  -name a boy/girl name  -name a food SLS 6\" step taps alt x20   SLS double taps 4\" alt x20    Hurdles over 6\" hurdles x3 by ballet bar (1UE use to 0UE, María for safety)  - lead R and L x3  - lateral x3  - reciprocal x3-4    Tiltboard taps ML and AP (middle level) x2' (2UE>1UE>0UE with min-mod A for safety)    STS x5  STS with overhead raise x5    Wide WILVER on airex in// bars  +added head turns and nods x30\" ea  +EC María    Walking laps around clinic holding tray with cone, then tennis ball  Hurdles over 6\" hurdles 0UE use  - lead R and L x2  - lateral x2    Tandem stance mod 3x30\" ea    4\" step up x10   6\" step up x10  New score (Original score)     Gait: pt ambulates on level ground without an assistive device but with visual reliance  Timed Up and Go (AD,time): 13.5 sec without an AD; 11.5 sec without AD (13.5 sec with SPC; 12.68 sec without AD); Fall Risk: Yes  5x Sit -->Stand: 25.31 sec use of 2 UE on arm rests; 15 sec with use fo 2UE on arm rests (21.42 sec, requires use of arm rests 2UE support); Fall Risk: Yes     Jaramillo Balance Scale  SITTING TO STANDING  (3)  4 able to stand without using hands and stabilize independently  3 able to stand independently using hands  2 able to stand using hands after several tries  1 needs minimal aid to stand or stabilize  0 needs moderate or maximal assist to stand     STANDING UNSUPPORTED  (4)  4 able to stand safely for 2 minutes  3 able to stand 2 minutes with supervision  2 able to stand 30 seconds unsupported  1 needs several tries to stand 30 seconds unsupported  0 unable to stand 30 seconds unsupported     SITTING WITH BACK UNSUPPORTED BUT FEET SUPPORTED ON FLOOR OR ON A STOOL  (4)  4 able to sit safely and  securely for 2 minutes  3 able to sit 2 minutes under supervision  2 able to able to sit 30 seconds  1 able to sit 10 seconds  0 unable to sit without support 10 seconds     STANDING TO SITTING  (3)  4 sits safely with minimal use of hands  3 controls descent by using hands  2 uses back of legs against chair to control descent  1 sits independently but has uncontrolled descent  0 needs assist to sit     TRANSFERS  (3)  4 able to transfer safely with minor use of hands  3 able to transfer safely definite need of hands  2 able to transfer with verbal cuing and/or supervision  1 needs one person to assist  0 needs two people to assist or supervise to be safe     STANDING UNSUPPORTED WITH EYES CLOSED  (4)  4 able to stand 10 seconds safely  3 able to stand 10 seconds with supervision   2 able to stand 3 seconds  1 unable to keep eyes closed 3 seconds but stays safely  0 needs help to keep from falling     STANDING UNSUPPORTED WITH FEET TOGETHER (4)  4 able to place feet together independently and stand 1 minute safely  3 able to place feet together independently and stand 1 minute with supervision  2 able to place feet together independently but unable to hold for 30 seconds  1 needs help to attain position but able to stand 15 seconds feet together  0 needs help to attain position and unable to hold for 15 seconds     REACHING FORWARD WITH OUTSTRETCHED ARM WHILE STANDING  (4)  4 can reach forward confidently 25 cm (10 inches)  3 can reach forward 12 cm (5 inches)  2 can reach forward 5 cm (2 inches)  1 reaches forward but needs supervision  0 loses balance while trying/requires external support      OBJECT FROM THE FLOOR FROM A STANDING POSITION  (4)  4 able to  slipper safely and easily  3 able to  slipper but needs supervision   2 unable to  but reaches 2-5 cm(1-2 inches) from slipper and keeps balance independently  1 unable to  and needs supervision while trying  0 unable to      try/needs assist to keep from losing balance or falling     TURNING TO LOOK BEHIND OVER LEFT AND RIGHT SHOULDERS WHILE STANDING  (4)  4 looks behind from both sides and weight shifts well  3 looks behind one side only other side shows less weight shift  2 turns sideways only but maintains balance  1 needs supervision when turning  0 needs assist to keep from losing balance or falling     TURN 360 DEGREES  (3)  4 able to turn 360 degrees safely in 4 seconds or less  3 able to turn 360 degrees safely one side only 4 seconds or less  2 able to turn 360 degrees safely but slowly  1 needs close supervision or verbal cuing  0 needs assistance while turning     PLACE ALTERNATE FOOT ON STEP OR STOOL WHILE STANDING UNSUPPORTED  (4)  4 able to stand independently and safely and complete 8 steps in 20 seconds  3 able to stand independently and complete 8 steps in > 20 seconds  2 able to complete 4 steps without aid with supervision  1 able to complete > 2 steps needs minimal assist  0 needs assistance to keep from falling/unable to try     STANDING UNSUPPORTED ONE FOOT IN FRONT  (0)  4 able to place foot tandem independently and hold 30 seconds  3 able to place foot ahead independently and hold 30 seconds  2 able to take small step independently and hold 30 seconds  1 needs help to step but can hold 15 seconds  0 loses balance while stepping or standing     STANDING ON ONE LEG (1)  4 able to lift leg independently and hold > 10 seconds  3 able to lift leg independently and hold 5-10 seconds  2 able to lift leg independently and hold >= 3 seconds  1 tries to lift leg unable to hold 3 seconds but remains standing independently.  0 unable to try of needs assist to prevent fall     Total score: 45/56     0-20: High fall risk, person will likely need the assistance of a wheelchair to move around safely.  21-40: Medium fall risk, person will need some type of walking assistance, such as a cane or a walker.  41-56: Low fall risk.      -----  4\" FSUO x10 bilat  6\" FUSO x10 bilat    Wall squat x10    Walking fwd/bwd in the hallway    Therapeutic Exercise Minutes 15  17    Neuro Re-Educ Minutes 30 42 23 40   Total Time Of Timed Procedures 45 42 40 40   Total Time Of Service-Based Procedures 0 0 0 0   Total Treatment Time 45 42 40 40   HEP   - Standing Romberg to 3/4 Tandem Stance  - 1 x daily - 7 x weekly - 3 sets - 30 sec hold        Multiple values from one day are sorted in reverse-chronological order        HEP  - Standing Romberg to 3/4 Tandem Stance  - 1 x daily - 7 x weekly - 3 sets - 30 sec hold    Charges  3 NMR

## 2025-07-30 ENCOUNTER — OFFICE VISIT (OUTPATIENT)
Dept: PHYSICAL THERAPY | Facility: HOSPITAL | Age: 89
End: 2025-07-30
Attending: STUDENT IN AN ORGANIZED HEALTH CARE EDUCATION/TRAINING PROGRAM
Payer: MEDICARE

## 2025-07-30 PROCEDURE — 97110 THERAPEUTIC EXERCISES: CPT

## 2025-07-30 PROCEDURE — 97112 NEUROMUSCULAR REEDUCATION: CPT

## 2025-08-05 ENCOUNTER — OFFICE VISIT (OUTPATIENT)
Dept: PHYSICAL THERAPY | Facility: HOSPITAL | Age: 89
End: 2025-08-05
Attending: STUDENT IN AN ORGANIZED HEALTH CARE EDUCATION/TRAINING PROGRAM

## 2025-08-05 PROCEDURE — 97110 THERAPEUTIC EXERCISES: CPT

## 2025-08-05 PROCEDURE — 97112 NEUROMUSCULAR REEDUCATION: CPT

## 2025-08-07 ENCOUNTER — OFFICE VISIT (OUTPATIENT)
Dept: PHYSICAL THERAPY | Facility: HOSPITAL | Age: 89
End: 2025-08-07
Attending: STUDENT IN AN ORGANIZED HEALTH CARE EDUCATION/TRAINING PROGRAM

## 2025-08-07 PROCEDURE — 97110 THERAPEUTIC EXERCISES: CPT

## 2025-08-07 PROCEDURE — 97112 NEUROMUSCULAR REEDUCATION: CPT

## 2025-08-12 ENCOUNTER — OFFICE VISIT (OUTPATIENT)
Dept: PHYSICAL THERAPY | Facility: HOSPITAL | Age: 89
End: 2025-08-12
Attending: STUDENT IN AN ORGANIZED HEALTH CARE EDUCATION/TRAINING PROGRAM

## 2025-08-12 PROCEDURE — 97112 NEUROMUSCULAR REEDUCATION: CPT

## 2025-08-14 ENCOUNTER — APPOINTMENT (OUTPATIENT)
Dept: PHYSICAL THERAPY | Facility: HOSPITAL | Age: 89
End: 2025-08-14
Attending: STUDENT IN AN ORGANIZED HEALTH CARE EDUCATION/TRAINING PROGRAM

## (undated) DEVICE — ELECTRODE ES RET 2 PATE W/ 10FT CRD MPLR DISP

## (undated) DEVICE — GLOVE SUR 6.5 SENSICARE PI PIP GRN PWD F

## (undated) DEVICE — SOLUTION IRRIG 3000ML 0.9% NACL FLX CONT

## (undated) DEVICE — SOLUTION IRRIG 1000ML 0.9% NACL USP BTL

## (undated) DEVICE — CYSTO PACK: Brand: MEDLINE INDUSTRIES, INC.

## (undated) DEVICE — GAMMEX® PI HYBRID SIZE 6, STERILE POWDER-FREE SURGICAL GLOVE, POLYISOPRENE AND NEOPRENE BLEND: Brand: GAMMEX

## (undated) DEVICE — SNAP KOVER: Brand: UNBRANDED

## (undated) DEVICE — CATHETER URETH 18FR BLLN 5CC SIL ALLY W/ SIL

## (undated) DEVICE — CANISTER SUCT 3000CC HI FLO DISP FOR FLD MGMT

## (undated) DEVICE — SOFT TISSUE SHAVER MINI: Brand: TRUCLEAR

## (undated) DEVICE — MEDI-VAC NON-CONDUCTIVE SUCTION TUBING: Brand: CARDINAL HEALTH

## (undated) DEVICE — BAG DRNGE 2000ML URIN INF CTRL ANTIREFLX

## (undated) DEVICE — OPEN-END FLEXI-TIP URETERAL CATHETER: Brand: FLEXI-TIP

## (undated) DEVICE — GLOVE SUR 6 SENSICARE PI MIC PIP CRM PWD F

## (undated) DEVICE — HYSTEROSCOPY: Brand: MEDLINE INDUSTRIES, INC.

## (undated) DEVICE — SOLUTION IRRIG 1000ML ST H2O AQUALITE PLAS

## (undated) DEVICE — ENDOSCOPIC VALVE WITH ADAPTER.: Brand: SURSEAL® II

## (undated) DEVICE — JELLY,LUBE,STERILE,FLIP TOP,TUBE,2-OZ: Brand: MEDLINE

## (undated) DEVICE — KIT HYSTEROSCOPIC PROC INCL INFLO OUTFLO TB

## (undated) DEVICE — NITINOL WIRE WITH HYDROPHILIC TIP: Brand: SENSOR

## (undated) DEVICE — ELITE HYSTEROSCOPE SEAL: Brand: TRUCLEAR

## (undated) DEVICE — CUTTING LOOP, BIPOLAR, 0.30MM, 24/26 FR.: Brand: N.A.

## (undated) NOTE — Clinical Note
6/17/2017              [address] [place] [address]. [address]        [facility]         Dear Shalini Crew,    Urine testing did show an infection that should be treated with the antibiotic that was prescribed.   Please let me know if your

## (undated) NOTE — LETTER
[place] [address]  Authorization for Invasive Procedures  Date: 2/19/2024           Time: 4427    I hereby authorize Dr. Wilson, my physician and his/her assistants (if applicable), which may include medical students, residents, and/or fellows, to perform the following surgical operation/ procedure and administer such anesthesia as may be determined necessary by my physician: Cardioversion/Automated Internal Cardiac Defibrillator check on Rachelle Hunter  2.   I recognize that during the surgical operation/procedure, unforeseen conditions may necessitate additional or different procedures than those listed above.  I, therefore, further authorize and request that the above-named surgeon, assistants, or designees perform such procedures as are, in their judgment, necessary and desirable.    3.   My surgeon/physician has discussed prior to my surgery the potential benefits, risks and side effects of this procedure; the likelihood of achieving goals; and potential problems that might occur during recuperation.  They also discussed reasonable alternatives to the procedure, including risks, benefits, and side effects related to the alternatives and risks related to not receiving this procedure.  I have had all my questions answered and I acknowledge that no guarantee has been made as to the result that may be obtained.    4.   Should the need arise during my operation/procedure, which includes change of level of care prior to discharge, I also consent to the administration of blood and/or blood products.  Further, I understand that despite careful testing and screening of blood or blood products by collecting agencies, I may still be subject to ill effects as a result of receiving a blood transfusion and/or blood products.  The following are some, but not all, of the potential risks that can occur: fever and allergic reactions, hemolytic reactions, transmission of diseases such as Hepatitis,  AIDS and Cytomegalovirus (CMV) and fluid overload.  In the event that I wish to have an autologous transfusion of my own blood, or a directed donor transfusion, I will discuss this with my physician.   Check only if Refusing Blood or Blood Products  I understand refusal of blood or blood products as deemed necessary by my physician may have serious consequences to my condition to include possible death. I hereby assume responsibility for my refusal and release the hospital, its personnel, and my physicians from any responsibility for the consequences of my refusal.         o  Refuse         5.   I authorize the use of any specimen, organs, tissues, body parts or foreign objects that may be removed from my body during the operation/procedure for diagnosis, research or teaching purposes and their subsequent disposal by hospital authorities.  I also authorize the release of specimen test results and/or written reports to my treating physician on the hospital medical staff or other referring or consulting physicians involved in my care, at the discretion of the Pathologist or my treating physician.    6.   I consent to the photographing or videotaping of the operations or procedures to be performed, including appropriate portions of my body for medical, scientific, or educational purposes, provided my identity is not revealed by the pictures or by descriptive texts accompanying them.  If the procedure has been photographed/videotaped, the surgeon will obtain the original picture, image, videotape or CD.  The hospital will not be responsible for storage, release or maintenance of the picture, image, tape or CD.    7.   I consent to the presence of a  or observers in the operating room as deemed necessary by my physician or their designees.    8.   I recognize that in the event my procedure results in extended X-Ray/fluoroscopy time, I may develop a skin reaction.    9. If I have a Do Not Attempt  Resuscitation (DNAR) order in place, that status will be suspended while in the operating room, procedural suite, and during the recovery period unless otherwise explicitly stated by me (or a person authorized to consent on my behalf). The surgeon or my attending physician will determine when the applicable recovery period ends for purposes of reinstating the DNAR order.  10. Patients having a sterilization procedure: I understand that if the procedure is successful the results will be permanent and it will therefore be impossible for me to inseminate, conceive, or bear children.  I also understand that the procedure is intended to result in sterility, although the result has not been guaranteed.   11. I acknowledge that my physician has explained sedation/analgesia administration to me including the risk and benefits I consent to the administration of sedation/analgesia as may be necessary or desirable in the judgment of my physician.    I CERTIFY THAT I HAVE READ AND FULLY UNDERSTAND THE ABOVE CONSENT TO OPERATION and/or OTHER PROCEDURE.        ____________________________________       _________________________________      ______________________________  Signature of Patient         Signature of Responsible Person        Printed Name of Responsible Person        ____________________________________      _________________________________      ______________________________       Signature of Witness          Relationship to Patient                       Date                                       Time    Patient Name: Rachelle Hunter     : 1935                 Printed: 2024      Medical Record #: [id number]                      Page 1 of 2          STATEMENT OF PHYSICIAN My signature below affirms that prior to the time of the procedure; I have explained to the patient and/or his/her legal representative, the risks and benefits involved in the proposed treatment and any reasonable alternative  to the proposed treatment. I have also explained the risks and benefits involved in refusal of the proposed treatment and alternatives to the proposed treatment and have answered the patient's questions. If I have a significant financial interest in a co-management agreement or a significant financial interest in any product or implant, or other significant relationship used in this procedure/surgery, I have disclosed this and had a discussion with my patient.     _______________________________________________________________ _____________________________  (Signature of Physician)                                                                                         (Date)                                   (Time)    Patient Name: Rachelle Hunter     : 1935                 Printed: 2024      Medical Record #: [id number]                      Page 2 of 2

## (undated) NOTE — LETTER
[address], [address], [address] [facility], [address] [place] [address], [address]  . [address]  [phone]        Dear Idalmis Piedra,      I had the pleasure of seeing your patient, Andres Dutton on 11/7/2018.      Below please find a s no other clinical features for temporal arteritis. At the present time she does not wish to go on medication. Of asked to call the office 2 days after CT has been completed. I appreciate the opportunity of participating in her neurological care.

## (undated) NOTE — Clinical Note
René - I'm seeing Rachelle for various pelvic floor complaints. She has micro hematuria on evaluation and was found to have both kidney stones and bladder lesion. I've asked her to follow with you given those things. Thanks! Marce

## (undated) NOTE — LETTER
[employer] Cardiac Device Communication Tool    Preop to complete    [facility] ([facility]) Phone: [phone] Fax: [phone]   Patient Name Rachelle Hunter   Patient  - AGE - SEX 1935 - A: 88 y - female   Surgical Date 2024   Surgical Procedure Hysteroscopy with dilation and curettage   Surgical Location [facility]   Type of cautery anticipated: Bipolar   Surgical procedure > 2 hours      Device Clinic to Complete the Information Below, Sign and Fax to [phone]    Pacemaker or ICD    Atrial or atrial-ventricular lead?        Indication for device    Is patient pacemaker dependent?    Has pt had routine f/u and is battery life > 3 months    Is ICD programmed to inhibit therapy w/magnet?    Does device have rate response or other sensor?      Surgical Procedure above Iliac Crest Surgical Procedure @ Iliac Crest and below   < 6 in. from ICD: Reprogram therapies OFF w/  asynchronous pacing if PM dependent  < 6 in. from PM: Reprogram asynchronous if PM   dependent ICD: No Change  PM: No Change   > 6 in. from ICD: Magnet*  > 6 in. from PM:  No Change*  * If PM dependent observe for pacing inhibition and minimize cautery if inhibition is seen                                              Bipolar cautery: No Change     Cardiac Device Management Plan (check one)     ___  Reprogram (PAT Dept to provide Rep w/ arrival date/time)   ___ Magnet    ___ No Change    Comments: ___________________________________________________________________        Signature: ________________________________ Date: ____________ Time: ______________     Print Name: ___________________________________

## (undated) NOTE — Clinical Note
Marcela Orantes I saw Roxfavian Folks today with vag/vulvar complaints and bladder sx. I've recommended vag estrogen, vulvar clobetasol, bowel mgmt and bladder diet/drill for now. I will work to manage her sx. I appreciate the opportunity to participate in her care.  Thanks, [employer]

## (undated) NOTE — Clinical Note
TCM assessment completed with patient. The patient is scheduled for a TCM/HFU with you on 02/26/2024.  Thank you.

## (undated) NOTE — Clinical Note
TCM assessment completed with patient. Patient declined to schedule a TCM appointment. A TE has been sent to clinical staff as an FYI/per TCM protocol.  Thank you.

## (undated) NOTE — LETTER
Jaxson Bush,    This is the [facility], office of Dr. Mann Wolf    Thank you for putting your trust in [facility].  Our goal is to deliver the highest quality healthcare and an exceptional patient experience.  Upon reviewing of your medical record shows you are due for the following:      Blood pressure follow up      Please call [phone] to schedule your appointment or schedule online via [employer].    If you changed to a new provider at another facility, please notify the clinic to update your records.    If you had any recent testing at another facility, please have your results faxed to our office at [phone].      Thank you and have a great day!

## (undated) NOTE — Clinical Note
Patient Name: Win Norton  : 1935  MRN: [id number]  Patient Address: [address]  [address] [facility] [address]      Coronavirus Disease 2019 (COVID-19)     [facility] is committed to the safety and well-being of our patients, judy carefully. If your symptoms get worse, call your healthcare provider immediately. 3. Get rest and stay hydrated.    4. If you have a medical appointment, call the healthcare provider ahead of time and tell them that you have or may have COVID-19.  5. For m of fever-reducing medications; and  · Improvement in respiratory symptoms (e.g., cough, shortness of breath); and  · At least 10 days have passed since symptoms first appeared OR if asymptomatic patient or date of symptom onset is unclear then use 10 days donors must:    · Have had a confirmed diagnosis of COVID-19  · Be symptom-free for at least 14 days*    *Some people will be required to have a repeat COVID-19 test in order to be eligible to donate.  If you’re instructed by Tori that a repeat test is r random. Researchers are trying to identify similarities between people with a Post-COVID condition to better understand if there are risk factors. How do I prevent a Post-COVID condition?   The best way to prevent the long-term symptoms of COVID-19 is

## (undated) NOTE — LETTER
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   This is a benign (not cancerous) growth, and requires no further t

## (undated) NOTE — MR AVS SNAPSHOT
After Visit Summary   4/12/2024    Rachelle Hunter   MRN: [id number]           Visit Information     Date & Time  4/12/2024 12:00 PM Provider  Marce Baker DO Department  [facility]ro[facility]necology Dept. Phone  [phone]      Your Vitals Were  Most recent update: 4/12/2024 11:56 AM    Resp   18    Ht   76\"    Wt   186 lb    BMI   22.64 kg/m²          Allergies as of 4/12/2024  Review status set to In Progress on 4/8/2024       Noted Reaction Type Reactions    Sulfa Antibiotics 09/11/2014    HIVES      Your Current Medications        Dosage    midodrine 5 MG Oral Tab Take 1 tablet (5 mg total) by mouth 2 (two) times daily before meals.    estradiol (ESTRACE) 0.1 MG/GM Vaginal Cream Apply 1/2 gram vaginally 2-3 times per week.    latanoprost 0.005 % Ophthalmic Solution INSTILL 1 DROP IN BOTH EYES AT BEDTIME    XARELTO 20 MG Oral Tab Take 1 tablet (20 mg total) by mouth daily with food.      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     We Ordered the Following     Normal Orders This Visit    Bladder Wash (Urine for Cytology) [[id number] CUSTOM]     POCT urinalysis dipstick[81921] [[id number] CUSTOM]       Future Appointments        Provider Department    4/16/2024 9:30 AM Lina [facility]    4/19/2024 9:00 AM Lynette Hernández [facility]    4/23/2024 9:00 AM Yanick [facility]    4/24/2024 8:30 AM Mann [facility]    11/5/2024 9:00 AM Mann [facility],  Myrtle Mcneal      Follow-up Instructions    Return for post op.                  Did you know that [facility] primary care physicians now offer Video Visits through [employer] for adult patients for a variety of conditions such as allergies, back pain and cold symptoms? Skip the drive and waiting room and online chat with a doctor face-to-face using your web-cam enabled computer or mobile device wherever you are. Video Visits cost $50 and can be paid hassle-free using a credit, debit, or health savings card.  Not active on [employer]? Ask us how to get signed up today!          If you receive a survey from Tish Varma, please take a few minutes to complete it and provide feedback. We strive to deliver the best patient experience and are looking for ways to make improvements. Your feedback will help us do so. For more information on Tish [employer], please visit [email or web address]           No text in SmartText           No text in SmartText